# Patient Record
Sex: FEMALE | Race: WHITE | NOT HISPANIC OR LATINO | URBAN - METROPOLITAN AREA
[De-identification: names, ages, dates, MRNs, and addresses within clinical notes are randomized per-mention and may not be internally consistent; named-entity substitution may affect disease eponyms.]

---

## 2017-02-07 ENCOUNTER — INPATIENT (INPATIENT)
Facility: HOSPITAL | Age: 77
LOS: 2 days | Discharge: HOME | End: 2017-02-10
Attending: INTERNAL MEDICINE | Admitting: INTERNAL MEDICINE
Payer: MEDICARE

## 2017-02-07 VITALS
HEART RATE: 100 BPM | RESPIRATION RATE: 16 BRPM | DIASTOLIC BLOOD PRESSURE: 116 MMHG | OXYGEN SATURATION: 97 % | SYSTOLIC BLOOD PRESSURE: 160 MMHG

## 2017-02-07 LAB
ALBUMIN SERPL ELPH-MCNC: 4.3 G/DL — SIGNIFICANT CHANGE UP (ref 3.3–5)
ALP SERPL-CCNC: 74 U/L — SIGNIFICANT CHANGE UP (ref 40–120)
ALT FLD-CCNC: 28 U/L — SIGNIFICANT CHANGE UP (ref 4–33)
APPEARANCE UR: CLEAR — SIGNIFICANT CHANGE UP
APTT BLD: 31.4 SEC — SIGNIFICANT CHANGE UP (ref 27.5–37.4)
AST SERPL-CCNC: 20 U/L — SIGNIFICANT CHANGE UP (ref 4–32)
BACTERIA # UR AUTO: SIGNIFICANT CHANGE UP
BASE EXCESS BLDV CALC-SCNC: 1.6 MMOL/L — SIGNIFICANT CHANGE UP
BASOPHILS # BLD AUTO: 0.04 K/UL — SIGNIFICANT CHANGE UP (ref 0–0.2)
BASOPHILS NFR BLD AUTO: 0.3 % — SIGNIFICANT CHANGE UP (ref 0–2)
BILIRUB SERPL-MCNC: 0.4 MG/DL — SIGNIFICANT CHANGE UP (ref 0.2–1.2)
BILIRUB UR-MCNC: NEGATIVE — SIGNIFICANT CHANGE UP
BLOOD GAS VENOUS - CREATININE: 0.67 MG/DL — SIGNIFICANT CHANGE UP (ref 0.5–1.3)
BLOOD UR QL VISUAL: HIGH
BUN SERPL-MCNC: 23 MG/DL — SIGNIFICANT CHANGE UP (ref 7–23)
CALCIUM SERPL-MCNC: 9.6 MG/DL — SIGNIFICANT CHANGE UP (ref 8.4–10.5)
CHLORIDE BLDV-SCNC: 106 MMOL/L — SIGNIFICANT CHANGE UP (ref 96–108)
CHLORIDE SERPL-SCNC: 99 MMOL/L — SIGNIFICANT CHANGE UP (ref 98–107)
CO2 SERPL-SCNC: 25 MMOL/L — SIGNIFICANT CHANGE UP (ref 22–31)
COLOR SPEC: SIGNIFICANT CHANGE UP
CREAT SERPL-MCNC: 0.66 MG/DL — SIGNIFICANT CHANGE UP (ref 0.5–1.3)
EOSINOPHIL # BLD AUTO: 0.06 K/UL — SIGNIFICANT CHANGE UP (ref 0–0.5)
EOSINOPHIL NFR BLD AUTO: 0.4 % — SIGNIFICANT CHANGE UP (ref 0–6)
GAS PNL BLDV: 136 MMOL/L — SIGNIFICANT CHANGE UP (ref 136–146)
GLUCOSE BLDV-MCNC: 180 — HIGH (ref 70–99)
GLUCOSE SERPL-MCNC: 189 MG/DL — HIGH (ref 70–99)
GLUCOSE UR-MCNC: 50 — SIGNIFICANT CHANGE UP
HBA1C BLD-MCNC: 6.1 % — HIGH (ref 4–5.6)
HCO3 BLDV-SCNC: 24 MMOL/L — SIGNIFICANT CHANGE UP (ref 20–27)
HCT VFR BLD CALC: 42.4 % — SIGNIFICANT CHANGE UP (ref 34.5–45)
HCT VFR BLDV CALC: 45.2 % — HIGH (ref 34.5–45)
HGB BLD-MCNC: 14.4 G/DL — SIGNIFICANT CHANGE UP (ref 11.5–15.5)
HGB BLDV-MCNC: 14.8 G/DL — SIGNIFICANT CHANGE UP (ref 11.5–15.5)
IMM GRANULOCYTES NFR BLD AUTO: 0.3 % — SIGNIFICANT CHANGE UP (ref 0–1.5)
INR BLD: 0.97 — SIGNIFICANT CHANGE UP (ref 0.87–1.18)
KETONES UR-MCNC: SIGNIFICANT CHANGE UP
LACTATE BLDV-MCNC: 1.6 MMOL/L — SIGNIFICANT CHANGE UP (ref 0.5–2)
LEUKOCYTE ESTERASE UR-ACNC: SIGNIFICANT CHANGE UP
LIDOCAIN IGE QN: 38.4 U/L — SIGNIFICANT CHANGE UP (ref 7–60)
LYMPHOCYTES # BLD AUTO: 1.55 K/UL — SIGNIFICANT CHANGE UP (ref 1–3.3)
LYMPHOCYTES # BLD AUTO: 10 % — LOW (ref 13–44)
MCHC RBC-ENTMCNC: 29.4 PG — SIGNIFICANT CHANGE UP (ref 27–34)
MCHC RBC-ENTMCNC: 34 % — SIGNIFICANT CHANGE UP (ref 32–36)
MCV RBC AUTO: 86.5 FL — SIGNIFICANT CHANGE UP (ref 80–100)
MONOCYTES # BLD AUTO: 0.49 K/UL — SIGNIFICANT CHANGE UP (ref 0–0.9)
MONOCYTES NFR BLD AUTO: 3.2 % — SIGNIFICANT CHANGE UP (ref 2–14)
MUCOUS THREADS # UR AUTO: SIGNIFICANT CHANGE UP
NEUTROPHILS # BLD AUTO: 13.31 K/UL — HIGH (ref 1.8–7.4)
NEUTROPHILS NFR BLD AUTO: 85.8 % — HIGH (ref 43–77)
NITRITE UR-MCNC: NEGATIVE — SIGNIFICANT CHANGE UP
PCO2 BLDV: 45 MMHG — SIGNIFICANT CHANGE UP (ref 41–51)
PH BLDV: 7.38 PH — SIGNIFICANT CHANGE UP (ref 7.32–7.43)
PH UR: 6 — SIGNIFICANT CHANGE UP (ref 4.6–8)
PLATELET # BLD AUTO: 254 K/UL — SIGNIFICANT CHANGE UP (ref 150–400)
PMV BLD: 10.5 FL — SIGNIFICANT CHANGE UP (ref 7–13)
PO2 BLDV: < 24 MMHG — LOW (ref 35–40)
POTASSIUM BLDV-SCNC: 4.7 MMOL/L — HIGH (ref 3.4–4.5)
POTASSIUM SERPL-MCNC: 4.3 MMOL/L — SIGNIFICANT CHANGE UP (ref 3.5–5.3)
POTASSIUM SERPL-SCNC: 4.3 MMOL/L — SIGNIFICANT CHANGE UP (ref 3.5–5.3)
PROT SERPL-MCNC: 7.2 G/DL — SIGNIFICANT CHANGE UP (ref 6–8.3)
PROT UR-MCNC: 30 — HIGH
PROTHROM AB SERPL-ACNC: 11 SEC — SIGNIFICANT CHANGE UP (ref 10–13.1)
RBC # BLD: 4.9 M/UL — SIGNIFICANT CHANGE UP (ref 3.8–5.2)
RBC # FLD: 13.1 % — SIGNIFICANT CHANGE UP (ref 10.3–14.5)
RBC CASTS # UR COMP ASSIST: SIGNIFICANT CHANGE UP (ref 0–?)
SAO2 % BLDV: 33.2 % — LOW (ref 60–85)
SODIUM SERPL-SCNC: 138 MMOL/L — SIGNIFICANT CHANGE UP (ref 135–145)
SP GR SPEC: 1.02 — SIGNIFICANT CHANGE UP (ref 1–1.03)
SQUAMOUS # UR AUTO: SIGNIFICANT CHANGE UP
UROBILINOGEN FLD QL: NORMAL E.U. — SIGNIFICANT CHANGE UP (ref 0.1–0.2)
WBC # BLD: 15.49 K/UL — HIGH (ref 3.8–10.5)
WBC # FLD AUTO: 15.49 K/UL — HIGH (ref 3.8–10.5)
WBC UR QL: HIGH (ref 0–?)

## 2017-02-07 RX ORDER — SODIUM CHLORIDE 9 MG/ML
1000 INJECTION INTRAMUSCULAR; INTRAVENOUS; SUBCUTANEOUS ONCE
Qty: 0 | Refills: 0 | Status: COMPLETED | OUTPATIENT
Start: 2017-02-07 | End: 2017-02-07

## 2017-02-07 RX ORDER — SIMVASTATIN 20 MG/1
5 TABLET, FILM COATED ORAL AT BEDTIME
Qty: 0 | Refills: 0 | Status: DISCONTINUED | OUTPATIENT
Start: 2017-02-07 | End: 2017-02-08

## 2017-02-07 RX ORDER — MORPHINE SULFATE 50 MG/1
4 CAPSULE, EXTENDED RELEASE ORAL ONCE
Qty: 0 | Refills: 0 | Status: DISCONTINUED | OUTPATIENT
Start: 2017-02-07 | End: 2017-02-07

## 2017-02-07 RX ORDER — LEVOTHYROXINE SODIUM 125 MCG
100 TABLET ORAL DAILY
Qty: 0 | Refills: 0 | Status: DISCONTINUED | OUTPATIENT
Start: 2017-02-07 | End: 2017-02-10

## 2017-02-07 RX ORDER — METRONIDAZOLE 500 MG
500 TABLET ORAL EVERY 8 HOURS
Qty: 0 | Refills: 0 | Status: DISCONTINUED | OUTPATIENT
Start: 2017-02-07 | End: 2017-02-08

## 2017-02-07 RX ORDER — ACETAMINOPHEN 500 MG
975 TABLET ORAL ONCE
Qty: 0 | Refills: 0 | Status: COMPLETED | OUTPATIENT
Start: 2017-02-07 | End: 2017-02-07

## 2017-02-07 RX ORDER — ONDANSETRON 8 MG/1
4 TABLET, FILM COATED ORAL ONCE
Qty: 0 | Refills: 0 | Status: COMPLETED | OUTPATIENT
Start: 2017-02-07 | End: 2017-02-07

## 2017-02-07 RX ORDER — METFORMIN HYDROCHLORIDE 850 MG/1
500 TABLET ORAL DAILY
Qty: 0 | Refills: 0 | Status: DISCONTINUED | OUTPATIENT
Start: 2017-02-07 | End: 2017-02-08

## 2017-02-07 RX ORDER — SODIUM CHLORIDE 9 MG/ML
1000 INJECTION INTRAMUSCULAR; INTRAVENOUS; SUBCUTANEOUS
Qty: 0 | Refills: 0 | Status: DISCONTINUED | OUTPATIENT
Start: 2017-02-07 | End: 2017-02-10

## 2017-02-07 RX ORDER — CIPROFLOXACIN LACTATE 400MG/40ML
400 VIAL (ML) INTRAVENOUS EVERY 12 HOURS
Qty: 0 | Refills: 0 | Status: DISCONTINUED | OUTPATIENT
Start: 2017-02-07 | End: 2017-02-10

## 2017-02-07 RX ADMIN — Medication 975 MILLIGRAM(S): at 21:47

## 2017-02-07 RX ADMIN — SODIUM CHLORIDE 1000 MILLILITER(S): 9 INJECTION INTRAMUSCULAR; INTRAVENOUS; SUBCUTANEOUS at 17:51

## 2017-02-07 RX ADMIN — MORPHINE SULFATE 4 MILLIGRAM(S): 50 CAPSULE, EXTENDED RELEASE ORAL at 17:50

## 2017-02-07 RX ADMIN — Medication 975 MILLIGRAM(S): at 20:50

## 2017-02-07 RX ADMIN — MORPHINE SULFATE 4 MILLIGRAM(S): 50 CAPSULE, EXTENDED RELEASE ORAL at 12:10

## 2017-02-07 RX ADMIN — ONDANSETRON 4 MILLIGRAM(S): 8 TABLET, FILM COATED ORAL at 11:25

## 2017-02-07 RX ADMIN — MORPHINE SULFATE 4 MILLIGRAM(S): 50 CAPSULE, EXTENDED RELEASE ORAL at 13:05

## 2017-02-07 RX ADMIN — Medication 500 MILLIGRAM(S): at 21:36

## 2017-02-07 RX ADMIN — MORPHINE SULFATE 4 MILLIGRAM(S): 50 CAPSULE, EXTENDED RELEASE ORAL at 11:25

## 2017-02-07 RX ADMIN — MORPHINE SULFATE 4 MILLIGRAM(S): 50 CAPSULE, EXTENDED RELEASE ORAL at 18:05

## 2017-02-07 RX ADMIN — Medication 200 MILLIGRAM(S): at 17:51

## 2017-02-07 RX ADMIN — SODIUM CHLORIDE 100 MILLILITER(S): 9 INJECTION INTRAMUSCULAR; INTRAVENOUS; SUBCUTANEOUS at 19:50

## 2017-02-07 RX ADMIN — MORPHINE SULFATE 4 MILLIGRAM(S): 50 CAPSULE, EXTENDED RELEASE ORAL at 12:09

## 2017-02-07 RX ADMIN — SODIUM CHLORIDE 1000 MILLILITER(S): 9 INJECTION INTRAMUSCULAR; INTRAVENOUS; SUBCUTANEOUS at 11:25

## 2017-02-07 RX ADMIN — ONDANSETRON 4 MILLIGRAM(S): 8 TABLET, FILM COATED ORAL at 19:20

## 2017-02-07 RX ADMIN — SIMVASTATIN 5 MILLIGRAM(S): 20 TABLET, FILM COATED ORAL at 21:36

## 2017-02-07 NOTE — ED CDU PROVIDER NOTE - ATTENDING CONTRIBUTION TO CARE
Pt was seen and evaluated by me. Pt notes having some lower abd pain with nausea and vomiting this morning. Pt denies any fever, chills, SOB, or chest pain. Pt has a history of 2 previous episodes of perforated diverticulitis requiring surgery as well as a cholecystectomy. Lungs CTA b/l. RRR. Abd soft with tenderness to mid abd area. Pt had a CT that showed enteritis. Sent to OBS for IV antibiotics and pain control. Dr Martin: Pt was seen and evaluated by me. Pt notes having some lower abd pain with nausea and vomiting this morning. Pt denies any fever, chills, SOB, or chest pain. Pt has a history of 2 previous episodes of perforated diverticulitis requiring surgery as well as a cholecystectomy. Lungs CTA b/l. RRR. Abd soft with tenderness to mid abd area. Pt had a CT that showed enteritis. Sent to OBS for IV antibiotics and pain control.

## 2017-02-07 NOTE — ED CDU PROVIDER NOTE - PROGRESS NOTE DETAILS
CDU MD Martin Note: Pt states feeling better. Currently receiving IV antibiotics. Pt denies any current nausea. Abd soft with minimal tenderness. Will continue IV antibiotics and pain control. CDU GRZEGORZ WARNER: pt seen at bedside, no complaints at  this time, getting abx, fluids, vss, l/s clear b/l, abd soft, minimal woody-umbilical  tenderness, will reassess CDU GRZEGORZ WARNER: pending repeat labs, will continue with fluids, abx reassess CDU GRZEGORZ García- Patient CDU GRZEGORZ García- Patient states  she still has abdominal pain. States that it has not improved much. Exam: heart- RRR s1s2 nl Lungs - clear bilaterally abd - soft ND tenderness along lower portion abdomen- RLQ,LLQ, Suprapubic region.  Labs: cbc- wbc 11.2 hgb 11.5 hct 34.7 plt 207  bmp- Na 141  K 3.6 cl 104 Co2 22  BUN 13 creat 0.5 gluc 143.  Plan for PO challenge this am. Continue Cipro and Flagyl antibiotics, Surgery team called for consult- patient has been a patient of Dr Lyon in past with surgery for diverticular disease.  Continue to monitor CDU GRZEGORZ García - Patient still complaining of abdominal pain. Feeling nauseated after eating earlier today. Will offer pain medication as needed, Continue to monitor CDU GRZEGORZ García - Patient continues to have ongoing complaints of pain, Has not been able to eat without pain or nausea. has had a little bit of clears only but still has pain. Spoke with patient's PMD Dr Cruz, Will admit to her service for pain management  and encourage PO intake. CDU ADMISSION NOTE: 76F h/o diverticulitis with prior perforation, cholecystectomy presents with n/v and abdominal pain.  In the ED found to have mild leukocytosis and enteritis on CT abd.  Started on cipro/flagyl overnight and made NPO. Patient reports nausea improved but persistent periumbilical pain.  Albe to tolerate PO fluids, but has not tried solids.  Overnight VSS, afebrile. On exam nad, mmm, lungs clear, rrr, abd + periumbilical ttp, no rebound or guarding, no rash, no edema, 2+ pulses.  Given famotidine.  Seen by surgery c/s given persistent pain, plan for admission to medicine. SUZANNA JIMENEZ MD.

## 2017-02-07 NOTE — ED PROVIDER NOTE - PSH
h/o cholecystectomy  2010    Left hand surgery 2004  h/o arthritis hand  Right hand surgery 1999 , h/o arthritis    S/P Appendectomy    S/P Colon Resection    S/P Hysterectomy

## 2017-02-07 NOTE — ED PROVIDER NOTE - ENMT, MLM
Airway patent, Nasal mucosa clear. Mouth with DRY mucosa. Throat has no vesicles, no oropharyngeal exudates and uvula is midline.

## 2017-02-07 NOTE — ED PROVIDER NOTE - PROGRESS NOTE DETAILS
message left with  at dr. morel office to please call back-patients ct shows enteritis, still with significant pain and ttp, will cdu for continued hydration and pain ctrl

## 2017-02-07 NOTE — ED PROVIDER NOTE - OBJECTIVE STATEMENT
77 y/o f presents with abd pain/vomiting. Started last night, many episodes yellow vomiting, nonbloody, diffuse abd pain, constant, severe. Had very small hard bm this morning and passing minimal gas. No fevers, cp, sob, dysuria, hematuria. Has never had pain like this before. H/o multiple abdominal surgeries from diverticulitis.

## 2017-02-07 NOTE — ED CDU PROVIDER NOTE - MEDICAL DECISION MAKING DETAILS
75 y/o female with abd pain with nausea and vomiting with history of perforated diverticulitis found to have colitis on CT. IV antibiotics and analgesia

## 2017-02-07 NOTE — ED CDU PROVIDER NOTE - OBJECTIVE STATEMENT
75 y/o female with PMHx of Hypothyroidism, Diverticulitis with perforation and history of cholecystectomy presents to ED for mid abd pain with nausea and vomiting X 1 day. Pt notes having nausea and vomiting with mid abd pain.

## 2017-02-07 NOTE — ED PROVIDER NOTE - PMH
Adult Hypothyroidism    Arthritis    Diverticulitis    h/o Cholecystitis    h/o Phlebitis 30 yrs ago    h/o Prolapsed Uterus    h/o Pulmonary Embolus 30 yrs ago    Hiatal Hernia    Hyperlipidemia    Meniscus tear lateral left knee    Sleep Apnea

## 2017-02-07 NOTE — ED PROVIDER NOTE - ATTENDING CONTRIBUTION TO CARE
75 y/o f presents with abd pain/vomiting. Started last night, many episodes yellow vomiting, nonbloody, diffuse abd pain, constant, severe. Had very small hard bm this morning and passing minimal gas. No fevers, cp, sob, dysuria, hematuria. Has never had pain like this before. H/o multiple abdominal surgeries from diverticulitis. On exam patient in pain. lungs and heart normal abdomen distended diffusely tender with voluntary guarding. bowel sounds present. Plan: analgesia, antiemetic hydration labs CT abdomen

## 2017-02-08 DIAGNOSIS — E78.5 HYPERLIPIDEMIA, UNSPECIFIED: ICD-10-CM

## 2017-02-08 DIAGNOSIS — E03.9 HYPOTHYROIDISM, UNSPECIFIED: ICD-10-CM

## 2017-02-08 DIAGNOSIS — M48.06 SPINAL STENOSIS, LUMBAR REGION: ICD-10-CM

## 2017-02-08 DIAGNOSIS — K52.9 NONINFECTIVE GASTROENTERITIS AND COLITIS, UNSPECIFIED: ICD-10-CM

## 2017-02-08 DIAGNOSIS — E11.9 TYPE 2 DIABETES MELLITUS WITHOUT COMPLICATIONS: ICD-10-CM

## 2017-02-08 LAB
ALBUMIN SERPL ELPH-MCNC: 3.5 G/DL — SIGNIFICANT CHANGE UP (ref 3.3–5)
ALP SERPL-CCNC: 49 U/L — SIGNIFICANT CHANGE UP (ref 40–120)
ALT FLD-CCNC: 26 U/L — SIGNIFICANT CHANGE UP (ref 4–33)
AST SERPL-CCNC: 19 U/L — SIGNIFICANT CHANGE UP (ref 4–32)
BASOPHILS # BLD AUTO: 0.03 K/UL — SIGNIFICANT CHANGE UP (ref 0–0.2)
BASOPHILS NFR BLD AUTO: 0.3 % — SIGNIFICANT CHANGE UP (ref 0–2)
BILIRUB SERPL-MCNC: 0.5 MG/DL — SIGNIFICANT CHANGE UP (ref 0.2–1.2)
BUN SERPL-MCNC: 13 MG/DL — SIGNIFICANT CHANGE UP (ref 7–23)
CALCIUM SERPL-MCNC: 8.6 MG/DL — SIGNIFICANT CHANGE UP (ref 8.4–10.5)
CHLORIDE SERPL-SCNC: 104 MMOL/L — SIGNIFICANT CHANGE UP (ref 98–107)
CO2 SERPL-SCNC: 22 MMOL/L — SIGNIFICANT CHANGE UP (ref 22–31)
CREAT SERPL-MCNC: 0.57 MG/DL — SIGNIFICANT CHANGE UP (ref 0.5–1.3)
EOSINOPHIL # BLD AUTO: 0.09 K/UL — SIGNIFICANT CHANGE UP (ref 0–0.5)
EOSINOPHIL NFR BLD AUTO: 0.8 % — SIGNIFICANT CHANGE UP (ref 0–6)
GLUCOSE SERPL-MCNC: 143 MG/DL — HIGH (ref 70–99)
HCT VFR BLD CALC: 34.7 % — SIGNIFICANT CHANGE UP (ref 34.5–45)
HGB BLD-MCNC: 11.5 G/DL — SIGNIFICANT CHANGE UP (ref 11.5–15.5)
IMM GRANULOCYTES NFR BLD AUTO: 0.4 % — SIGNIFICANT CHANGE UP (ref 0–1.5)
LYMPHOCYTES # BLD AUTO: 1.97 K/UL — SIGNIFICANT CHANGE UP (ref 1–3.3)
LYMPHOCYTES # BLD AUTO: 17.5 % — SIGNIFICANT CHANGE UP (ref 13–44)
MCHC RBC-ENTMCNC: 28.7 PG — SIGNIFICANT CHANGE UP (ref 27–34)
MCHC RBC-ENTMCNC: 33.1 % — SIGNIFICANT CHANGE UP (ref 32–36)
MCV RBC AUTO: 86.5 FL — SIGNIFICANT CHANGE UP (ref 80–100)
MONOCYTES # BLD AUTO: 0.89 K/UL — SIGNIFICANT CHANGE UP (ref 0–0.9)
MONOCYTES NFR BLD AUTO: 7.9 % — SIGNIFICANT CHANGE UP (ref 2–14)
NEUTROPHILS # BLD AUTO: 8.21 K/UL — HIGH (ref 1.8–7.4)
NEUTROPHILS NFR BLD AUTO: 73.1 % — SIGNIFICANT CHANGE UP (ref 43–77)
PLATELET # BLD AUTO: 207 K/UL — SIGNIFICANT CHANGE UP (ref 150–400)
PMV BLD: 9.9 FL — SIGNIFICANT CHANGE UP (ref 7–13)
POTASSIUM SERPL-MCNC: 3.6 MMOL/L — SIGNIFICANT CHANGE UP (ref 3.5–5.3)
POTASSIUM SERPL-SCNC: 3.6 MMOL/L — SIGNIFICANT CHANGE UP (ref 3.5–5.3)
PROT SERPL-MCNC: 5.9 G/DL — LOW (ref 6–8.3)
RBC # BLD: 4.01 M/UL — SIGNIFICANT CHANGE UP (ref 3.8–5.2)
RBC # FLD: 13.3 % — SIGNIFICANT CHANGE UP (ref 10.3–14.5)
SODIUM SERPL-SCNC: 141 MMOL/L — SIGNIFICANT CHANGE UP (ref 135–145)
SPECIMEN SOURCE: SIGNIFICANT CHANGE UP
WBC # BLD: 11.23 K/UL — HIGH (ref 3.8–10.5)
WBC # FLD AUTO: 11.23 K/UL — HIGH (ref 3.8–10.5)

## 2017-02-08 PROCEDURE — 93010 ELECTROCARDIOGRAM REPORT: CPT

## 2017-02-08 PROCEDURE — 99223 1ST HOSP IP/OBS HIGH 75: CPT

## 2017-02-08 RX ORDER — GLUCAGON INJECTION, SOLUTION 0.5 MG/.1ML
1 INJECTION, SOLUTION SUBCUTANEOUS ONCE
Qty: 0 | Refills: 0 | Status: DISCONTINUED | OUTPATIENT
Start: 2017-02-08 | End: 2017-02-10

## 2017-02-08 RX ORDER — DEXTROSE 50 % IN WATER 50 %
25 SYRINGE (ML) INTRAVENOUS ONCE
Qty: 0 | Refills: 0 | Status: DISCONTINUED | OUTPATIENT
Start: 2017-02-08 | End: 2017-02-10

## 2017-02-08 RX ORDER — ACETAMINOPHEN 500 MG
650 TABLET ORAL EVERY 6 HOURS
Qty: 0 | Refills: 0 | Status: DISCONTINUED | OUTPATIENT
Start: 2017-02-08 | End: 2017-02-08

## 2017-02-08 RX ORDER — INSULIN LISPRO 100/ML
VIAL (ML) SUBCUTANEOUS
Qty: 0 | Refills: 0 | Status: DISCONTINUED | OUTPATIENT
Start: 2017-02-08 | End: 2017-02-10

## 2017-02-08 RX ORDER — DEXTROSE 50 % IN WATER 50 %
12.5 SYRINGE (ML) INTRAVENOUS ONCE
Qty: 0 | Refills: 0 | Status: DISCONTINUED | OUTPATIENT
Start: 2017-02-08 | End: 2017-02-10

## 2017-02-08 RX ORDER — ATORVASTATIN CALCIUM 80 MG/1
20 TABLET, FILM COATED ORAL AT BEDTIME
Qty: 0 | Refills: 0 | Status: DISCONTINUED | OUTPATIENT
Start: 2017-02-08 | End: 2017-02-10

## 2017-02-08 RX ORDER — HEPARIN SODIUM 5000 [USP'U]/ML
5000 INJECTION INTRAVENOUS; SUBCUTANEOUS EVERY 12 HOURS
Qty: 0 | Refills: 0 | Status: DISCONTINUED | OUTPATIENT
Start: 2017-02-08 | End: 2017-02-08

## 2017-02-08 RX ORDER — DOCUSATE SODIUM 100 MG
100 CAPSULE ORAL THREE TIMES A DAY
Qty: 0 | Refills: 0 | Status: DISCONTINUED | OUTPATIENT
Start: 2017-02-08 | End: 2017-02-10

## 2017-02-08 RX ORDER — ASPIRIN/CALCIUM CARB/MAGNESIUM 324 MG
81 TABLET ORAL DAILY
Qty: 0 | Refills: 0 | Status: DISCONTINUED | OUTPATIENT
Start: 2017-02-08 | End: 2017-02-10

## 2017-02-08 RX ORDER — HEPARIN SODIUM 5000 [USP'U]/ML
5000 INJECTION INTRAVENOUS; SUBCUTANEOUS EVERY 8 HOURS
Qty: 0 | Refills: 0 | Status: DISCONTINUED | OUTPATIENT
Start: 2017-02-08 | End: 2017-02-10

## 2017-02-08 RX ORDER — ACETAMINOPHEN 500 MG
650 TABLET ORAL EVERY 8 HOURS
Qty: 0 | Refills: 0 | Status: DISCONTINUED | OUTPATIENT
Start: 2017-02-08 | End: 2017-02-10

## 2017-02-08 RX ORDER — MORPHINE SULFATE 50 MG/1
2 CAPSULE, EXTENDED RELEASE ORAL ONCE
Qty: 0 | Refills: 0 | Status: DISCONTINUED | OUTPATIENT
Start: 2017-02-08 | End: 2017-02-08

## 2017-02-08 RX ORDER — METRONIDAZOLE 500 MG
500 TABLET ORAL EVERY 8 HOURS
Qty: 0 | Refills: 0 | Status: DISCONTINUED | OUTPATIENT
Start: 2017-02-08 | End: 2017-02-10

## 2017-02-08 RX ORDER — KETOROLAC TROMETHAMINE 30 MG/ML
15 SYRINGE (ML) INJECTION EVERY 12 HOURS
Qty: 0 | Refills: 0 | Status: DISCONTINUED | OUTPATIENT
Start: 2017-02-08 | End: 2017-02-08

## 2017-02-08 RX ORDER — LACTOBACILLUS ACIDOPHILUS 100MM CELL
1 CAPSULE ORAL EVERY 12 HOURS
Qty: 0 | Refills: 0 | Status: DISCONTINUED | OUTPATIENT
Start: 2017-02-08 | End: 2017-02-10

## 2017-02-08 RX ORDER — INSULIN LISPRO 100/ML
VIAL (ML) SUBCUTANEOUS AT BEDTIME
Qty: 0 | Refills: 0 | Status: DISCONTINUED | OUTPATIENT
Start: 2017-02-08 | End: 2017-02-10

## 2017-02-08 RX ORDER — FAMOTIDINE 10 MG/ML
20 INJECTION INTRAVENOUS ONCE
Qty: 0 | Refills: 0 | Status: COMPLETED | OUTPATIENT
Start: 2017-02-08 | End: 2017-02-08

## 2017-02-08 RX ORDER — ONDANSETRON 8 MG/1
4 TABLET, FILM COATED ORAL EVERY 8 HOURS
Qty: 0 | Refills: 0 | Status: DISCONTINUED | OUTPATIENT
Start: 2017-02-08 | End: 2017-02-10

## 2017-02-08 RX ORDER — DEXTROSE 50 % IN WATER 50 %
1 SYRINGE (ML) INTRAVENOUS ONCE
Qty: 0 | Refills: 0 | Status: DISCONTINUED | OUTPATIENT
Start: 2017-02-08 | End: 2017-02-10

## 2017-02-08 RX ORDER — SODIUM CHLORIDE 9 MG/ML
1000 INJECTION, SOLUTION INTRAVENOUS
Qty: 0 | Refills: 0 | Status: DISCONTINUED | OUTPATIENT
Start: 2017-02-08 | End: 2017-02-10

## 2017-02-08 RX ORDER — PANTOPRAZOLE SODIUM 20 MG/1
40 TABLET, DELAYED RELEASE ORAL DAILY
Qty: 0 | Refills: 0 | Status: DISCONTINUED | OUTPATIENT
Start: 2017-02-08 | End: 2017-02-10

## 2017-02-08 RX ADMIN — Medication 650 MILLIGRAM(S): at 11:21

## 2017-02-08 RX ADMIN — Medication 650 MILLIGRAM(S): at 12:15

## 2017-02-08 RX ADMIN — Medication 15 MILLIGRAM(S): at 17:45

## 2017-02-08 RX ADMIN — FAMOTIDINE 20 MILLIGRAM(S): 10 INJECTION INTRAVENOUS at 09:13

## 2017-02-08 RX ADMIN — METFORMIN HYDROCHLORIDE 500 MILLIGRAM(S): 850 TABLET ORAL at 12:41

## 2017-02-08 RX ADMIN — Medication 100 MICROGRAM(S): at 06:26

## 2017-02-08 RX ADMIN — Medication 200 MILLIGRAM(S): at 06:27

## 2017-02-08 RX ADMIN — MORPHINE SULFATE 2 MILLIGRAM(S): 50 CAPSULE, EXTENDED RELEASE ORAL at 12:41

## 2017-02-08 RX ADMIN — Medication 500 MILLIGRAM(S): at 06:26

## 2017-02-08 RX ADMIN — SODIUM CHLORIDE 100 MILLILITER(S): 9 INJECTION INTRAMUSCULAR; INTRAVENOUS; SUBCUTANEOUS at 12:42

## 2017-02-08 RX ADMIN — MORPHINE SULFATE 2 MILLIGRAM(S): 50 CAPSULE, EXTENDED RELEASE ORAL at 02:33

## 2017-02-08 RX ADMIN — Medication 15 MILLIGRAM(S): at 17:29

## 2017-02-08 RX ADMIN — MORPHINE SULFATE 2 MILLIGRAM(S): 50 CAPSULE, EXTENDED RELEASE ORAL at 02:47

## 2017-02-08 RX ADMIN — MORPHINE SULFATE 2 MILLIGRAM(S): 50 CAPSULE, EXTENDED RELEASE ORAL at 13:00

## 2017-02-08 RX ADMIN — SODIUM CHLORIDE 100 MILLILITER(S): 9 INJECTION INTRAMUSCULAR; INTRAVENOUS; SUBCUTANEOUS at 05:10

## 2017-02-08 RX ADMIN — Medication 500 MILLIGRAM(S): at 12:45

## 2017-02-08 RX ADMIN — Medication 200 MILLIGRAM(S): at 18:00

## 2017-02-08 NOTE — H&P ADULT. - ATTENDING COMMENTS
Pt was seen & Examined by me, Dr. GRICEL Kingston on 2/08/2017.    Dr. Cruz will resume the care of pt in  AM.     I Notified the covering MD for Dr. Nancy young regarding the admission, he was told that pt was admitted to the Medical service from CDU and will be covered by ADS team and he gave okay to the H+P tonight for him.

## 2017-02-08 NOTE — H&P ADULT. - ASSESSMENT
75 y/o female HX of DM, OA, Hypothyroid, Obesity, HX of REMOTE PE and Phlebitis years ago, HX of Diverticulitis with multiple abdominal surgeries in the past, Hiatal Hernia, PREMA not on CPAP, Spinal Stenosis L/S, High Cholesterol, Pt was admitted to CDU initially for lower abdominal pain, + N/V with Bile return since Sunday, No fever, No Dysuria, + Urinary frequency, Last BM 2 days ago, + constipation, passes gas, + mild  HA, No Blood in vomit, NO CP, NO SOB, no cough, no sore throat, no rash, No Neck pain, Chronic LBP, No legs pain, No dizziness, pt S/P CT A/P with contrast c/w Enteritis involving a loop of pelvic small bowel, seen by surgery, no intervention, recommended Cipro, Flagyl, Supportive care and pain control, Pt s/p IV Morphine in the CDU with some help, but still c/o Lower abdominal pain, refuses Morphine now due to constipation, on Clear Liquid diet, and IVF NS, Pt is A+O x3,

## 2017-02-08 NOTE — H&P ADULT. - PROBLEM SELECTOR PLAN 1
Surgery F/U, GI Consult House in AM, Clear Liquid Diet, IVF NS, Pain Control IV Zofran PRN, IV Protonix,   IV Cipro, IV Flagyl, Bacid  F/U CBC, CMP, Repeat UA, Iron studies, Vit B12, Folate, Hepatitis A, B, C Profile  Colace,   Fall/Aspiration precaution

## 2017-02-08 NOTE — H&P ADULT. - PMH
Adult Hypothyroidism    Arthritis    Diverticulitis    DM (diabetes mellitus)    h/o Cholecystitis    h/o Phlebitis 30 yrs ago    h/o Prolapsed Uterus    h/o Pulmonary Embolus 30 yrs ago    Hiatal Hernia    Hyperlipidemia    Meniscus tear lateral left knee    Sleep Apnea    Spinal stenosis of lumbar region

## 2017-02-08 NOTE — H&P ADULT. - MUSCULOSKELETAL
details… detailed exam no joint erythema/no joint swelling/no calf tenderness/ROM intact/no joint warmth

## 2017-02-08 NOTE — H&P ADULT. - HISTORY OF PRESENT ILLNESS
77 y/o female HX of DM, OA, Hypothyroid, Obesity, HX of REMOTE PE and Phlebitis years ago, HX of Diverticulitis with multiple abdominal surgeries in the past, Hiatal Hernia, PREMA not on CPAP, Spinal Stenosis L/S, High Cholesterol, Pt was admitted to CDU initially for lower abdominal pain, + N/V with Bile return since Sunday, No fever, No Dysuria, + Urinary frequency, Last BM 2 days ago, + constipation, passes gas, + mild  HA, No Blood in vomit, NO CP, NO SOB, no cough, no sore throat, no rash, No Neck pain, Chronic LBP, No legs pain, No dizziness, pt S/P CT A/P with contrast c/w Enteritis involving a loop of pelvic small bowel, seen by surgery, no intervention, recommended Cipro, Flagyl, Supportive care and pain control, Pt s/p IV Morphine in the CDU with some help, but still c/o Lower abdominal pain, refuses Morphine now due to constipation, on Clear Liquid diet, and IVF NS, Pt is A+O x3,     Labs: Na 141, K+ 3.6, Glucose 143, LFT Normal, BUN 13, Creatinine 0.57, WBC 11.23 ( Leukocytosis), Platelet 207, Hgb 11.5, Urine Culture Neg., PT 11, INR 0.97, PTT 31.4, Hgb 6.1%, UA: Small Blood, Glucose  50, trace Ketone,      Vitals: Tem 98, HR 61, /57, RR 18, 98 % RA,

## 2017-02-09 LAB
ALBUMIN SERPL ELPH-MCNC: 3.4 G/DL — SIGNIFICANT CHANGE UP (ref 3.3–5)
ALP SERPL-CCNC: 48 U/L — SIGNIFICANT CHANGE UP (ref 40–120)
ALT FLD-CCNC: 42 U/L — HIGH (ref 4–33)
AMYLASE P1 CFR SERPL: 31 U/L — SIGNIFICANT CHANGE UP (ref 25–125)
AST SERPL-CCNC: 31 U/L — SIGNIFICANT CHANGE UP (ref 4–32)
BACTERIA UR CULT: SIGNIFICANT CHANGE UP
BASOPHILS # BLD AUTO: 0.05 K/UL — SIGNIFICANT CHANGE UP (ref 0–0.2)
BASOPHILS NFR BLD AUTO: 0.7 % — SIGNIFICANT CHANGE UP (ref 0–2)
BILIRUB SERPL-MCNC: 0.4 MG/DL — SIGNIFICANT CHANGE UP (ref 0.2–1.2)
BUN SERPL-MCNC: 11 MG/DL — SIGNIFICANT CHANGE UP (ref 7–23)
CALCIUM SERPL-MCNC: 8.6 MG/DL — SIGNIFICANT CHANGE UP (ref 8.4–10.5)
CHLORIDE SERPL-SCNC: 108 MMOL/L — HIGH (ref 98–107)
CHOLEST SERPL-MCNC: 113 MG/DL — LOW (ref 120–199)
CK SERPL-CCNC: 120 U/L — SIGNIFICANT CHANGE UP (ref 25–170)
CO2 SERPL-SCNC: 22 MMOL/L — SIGNIFICANT CHANGE UP (ref 22–31)
CREAT SERPL-MCNC: 0.57 MG/DL — SIGNIFICANT CHANGE UP (ref 0.5–1.3)
EOSINOPHIL # BLD AUTO: 0.19 K/UL — SIGNIFICANT CHANGE UP (ref 0–0.5)
EOSINOPHIL NFR BLD AUTO: 2.5 % — SIGNIFICANT CHANGE UP (ref 0–6)
FERRITIN SERPL-MCNC: 421.6 NG/ML — HIGH (ref 15–150)
FOLATE SERPL-MCNC: 12.7 NG/ML — SIGNIFICANT CHANGE UP (ref 4.7–20)
GLUCOSE SERPL-MCNC: 107 MG/DL — HIGH (ref 70–99)
HAV IGM SER-ACNC: NONREACTIVE — SIGNIFICANT CHANGE UP
HBV CORE IGM SER-ACNC: NONREACTIVE — SIGNIFICANT CHANGE UP
HBV SURFACE AG SER-ACNC: NONREACTIVE — SIGNIFICANT CHANGE UP
HCT VFR BLD CALC: 32.8 % — LOW (ref 34.5–45)
HCV AB S/CO SERPL IA: 0.17 S/CO — SIGNIFICANT CHANGE UP
HCV AB SERPL-IMP: SIGNIFICANT CHANGE UP
HDLC SERPL-MCNC: 60 MG/DL — SIGNIFICANT CHANGE UP (ref 45–65)
HGB BLD-MCNC: 10.9 G/DL — LOW (ref 11.5–15.5)
IMM GRANULOCYTES NFR BLD AUTO: 0.4 % — SIGNIFICANT CHANGE UP (ref 0–1.5)
IRON SATN MFR SERPL: 237 UG/DL — SIGNIFICANT CHANGE UP (ref 140–530)
IRON SATN MFR SERPL: 41 UG/DL — SIGNIFICANT CHANGE UP (ref 30–160)
LIDOCAIN IGE QN: 30.8 U/L — SIGNIFICANT CHANGE UP (ref 7–60)
LIPID PNL WITH DIRECT LDL SERPL: 48 MG/DL — SIGNIFICANT CHANGE UP
LYMPHOCYTES # BLD AUTO: 2.42 K/UL — SIGNIFICANT CHANGE UP (ref 1–3.3)
LYMPHOCYTES # BLD AUTO: 32.1 % — SIGNIFICANT CHANGE UP (ref 13–44)
MAGNESIUM SERPL-MCNC: 1.8 MG/DL — SIGNIFICANT CHANGE UP (ref 1.6–2.6)
MCHC RBC-ENTMCNC: 28.8 PG — SIGNIFICANT CHANGE UP (ref 27–34)
MCHC RBC-ENTMCNC: 33.2 % — SIGNIFICANT CHANGE UP (ref 32–36)
MCV RBC AUTO: 86.8 FL — SIGNIFICANT CHANGE UP (ref 80–100)
MONOCYTES # BLD AUTO: 0.66 K/UL — SIGNIFICANT CHANGE UP (ref 0–0.9)
MONOCYTES NFR BLD AUTO: 8.8 % — SIGNIFICANT CHANGE UP (ref 2–14)
NEUTROPHILS # BLD AUTO: 4.18 K/UL — SIGNIFICANT CHANGE UP (ref 1.8–7.4)
NEUTROPHILS NFR BLD AUTO: 55.5 % — SIGNIFICANT CHANGE UP (ref 43–77)
PHOSPHATE SERPL-MCNC: 2.7 MG/DL — SIGNIFICANT CHANGE UP (ref 2.5–4.5)
PLATELET # BLD AUTO: 195 K/UL — SIGNIFICANT CHANGE UP (ref 150–400)
PMV BLD: 10.2 FL — SIGNIFICANT CHANGE UP (ref 7–13)
POTASSIUM SERPL-MCNC: 3.4 MMOL/L — LOW (ref 3.5–5.3)
POTASSIUM SERPL-SCNC: 3.4 MMOL/L — LOW (ref 3.5–5.3)
PROT SERPL-MCNC: 5.8 G/DL — LOW (ref 6–8.3)
RBC # BLD: 3.78 M/UL — LOW (ref 3.8–5.2)
RBC # FLD: 13.4 % — SIGNIFICANT CHANGE UP (ref 10.3–14.5)
SODIUM SERPL-SCNC: 143 MMOL/L — SIGNIFICANT CHANGE UP (ref 135–145)
T4 FREE SERPL-MCNC: 1.15 NG/DL — SIGNIFICANT CHANGE UP (ref 0.9–1.8)
TRIGL SERPL-MCNC: 72 MG/DL — SIGNIFICANT CHANGE UP (ref 10–149)
TSH SERPL-MCNC: 3.96 UIU/ML — SIGNIFICANT CHANGE UP (ref 0.27–4.2)
UIBC SERPL-MCNC: 196 UG/DL — SIGNIFICANT CHANGE UP (ref 110–370)
VIT B12 SERPL-MCNC: 463 PG/ML — SIGNIFICANT CHANGE UP (ref 200–900)
WBC # BLD: 7.53 K/UL — SIGNIFICANT CHANGE UP (ref 3.8–10.5)
WBC # FLD AUTO: 7.53 K/UL — SIGNIFICANT CHANGE UP (ref 3.8–10.5)

## 2017-02-09 PROCEDURE — 99222 1ST HOSP IP/OBS MODERATE 55: CPT | Mod: GC

## 2017-02-09 RX ORDER — LACTULOSE 10 G/15ML
10 SOLUTION ORAL
Qty: 0 | Refills: 0 | Status: DISCONTINUED | OUTPATIENT
Start: 2017-02-09 | End: 2017-02-10

## 2017-02-09 RX ORDER — POTASSIUM CHLORIDE 20 MEQ
20 PACKET (EA) ORAL ONCE
Qty: 0 | Refills: 0 | Status: COMPLETED | OUTPATIENT
Start: 2017-02-09 | End: 2017-02-09

## 2017-02-09 RX ADMIN — Medication 100 MILLIGRAM(S): at 15:17

## 2017-02-09 RX ADMIN — HEPARIN SODIUM 5000 UNIT(S): 5000 INJECTION INTRAVENOUS; SUBCUTANEOUS at 00:11

## 2017-02-09 RX ADMIN — ATORVASTATIN CALCIUM 20 MILLIGRAM(S): 80 TABLET, FILM COATED ORAL at 00:11

## 2017-02-09 RX ADMIN — Medication 100 MILLIGRAM(S): at 15:25

## 2017-02-09 RX ADMIN — LACTULOSE 10 GRAM(S): 10 SOLUTION ORAL at 17:31

## 2017-02-09 RX ADMIN — Medication 100 MICROGRAM(S): at 06:30

## 2017-02-09 RX ADMIN — Medication 200 MILLIGRAM(S): at 06:25

## 2017-02-09 RX ADMIN — Medication 100 MILLIGRAM(S): at 00:10

## 2017-02-09 RX ADMIN — Medication 100 MILLIGRAM(S): at 08:34

## 2017-02-09 RX ADMIN — Medication 200 MILLIGRAM(S): at 17:31

## 2017-02-09 RX ADMIN — PANTOPRAZOLE SODIUM 40 MILLIGRAM(S): 20 TABLET, DELAYED RELEASE ORAL at 00:10

## 2017-02-09 RX ADMIN — Medication 81 MILLIGRAM(S): at 15:17

## 2017-02-09 RX ADMIN — Medication 1 TABLET(S): at 06:26

## 2017-02-09 RX ADMIN — HEPARIN SODIUM 5000 UNIT(S): 5000 INJECTION INTRAVENOUS; SUBCUTANEOUS at 06:26

## 2017-02-09 RX ADMIN — SODIUM CHLORIDE 100 MILLILITER(S): 9 INJECTION INTRAMUSCULAR; INTRAVENOUS; SUBCUTANEOUS at 15:17

## 2017-02-09 RX ADMIN — PANTOPRAZOLE SODIUM 40 MILLIGRAM(S): 20 TABLET, DELAYED RELEASE ORAL at 17:31

## 2017-02-09 RX ADMIN — SODIUM CHLORIDE 100 MILLILITER(S): 9 INJECTION INTRAMUSCULAR; INTRAVENOUS; SUBCUTANEOUS at 08:35

## 2017-02-09 RX ADMIN — Medication 650 MILLIGRAM(S): at 11:17

## 2017-02-09 RX ADMIN — ATORVASTATIN CALCIUM 20 MILLIGRAM(S): 80 TABLET, FILM COATED ORAL at 21:57

## 2017-02-09 RX ADMIN — Medication 650 MILLIGRAM(S): at 12:17

## 2017-02-09 RX ADMIN — Medication 5 MILLIGRAM(S): at 17:31

## 2017-02-09 RX ADMIN — Medication 20 MILLIEQUIVALENT(S): at 15:16

## 2017-02-09 RX ADMIN — Medication 100 MILLIGRAM(S): at 06:26

## 2017-02-09 RX ADMIN — Medication 100 MILLIGRAM(S): at 21:57

## 2017-02-09 RX ADMIN — Medication 1: at 12:55

## 2017-02-09 RX ADMIN — HEPARIN SODIUM 5000 UNIT(S): 5000 INJECTION INTRAVENOUS; SUBCUTANEOUS at 15:17

## 2017-02-09 RX ADMIN — HEPARIN SODIUM 5000 UNIT(S): 5000 INJECTION INTRAVENOUS; SUBCUTANEOUS at 21:57

## 2017-02-09 RX ADMIN — Medication 1 TABLET(S): at 17:31

## 2017-02-09 NOTE — PHYSICAL THERAPY INITIAL EVALUATION ADULT - ADDITIONAL COMMENTS
Pt lives in house with 3 steps to enter and no other stairs to negotiate, ambulates independently without AD

## 2017-02-09 NOTE — PHYSICAL THERAPY INITIAL EVALUATION ADULT - PERTINENT HX OF CURRENT PROBLEM, REHAB EVAL
75 y/o female HX of DM, OA, Hypothyroid, Obesity, HX of REMOTE PE and Phlebitis years ago, HX of Diverticulitis with multiple abdominal surgeries in the past, Hiatal Hernia, PREMA not on CPAP, Spinal Stenosis L/S, High Cholesterol, Pt was admitted to CDU initially for lower abdominal pain

## 2017-02-10 ENCOUNTER — TRANSCRIPTION ENCOUNTER (OUTPATIENT)
Age: 77
End: 2017-02-10

## 2017-02-10 VITALS
TEMPERATURE: 97 F | OXYGEN SATURATION: 100 % | DIASTOLIC BLOOD PRESSURE: 81 MMHG | RESPIRATION RATE: 18 BRPM | HEART RATE: 67 BPM | SYSTOLIC BLOOD PRESSURE: 173 MMHG

## 2017-02-10 LAB
APPEARANCE UR: CLEAR — SIGNIFICANT CHANGE UP
BILIRUB UR-MCNC: NEGATIVE — SIGNIFICANT CHANGE UP
BLOOD UR QL VISUAL: NEGATIVE — SIGNIFICANT CHANGE UP
BUN SERPL-MCNC: 10 MG/DL — SIGNIFICANT CHANGE UP (ref 7–23)
CALCIUM SERPL-MCNC: 9.4 MG/DL — SIGNIFICANT CHANGE UP (ref 8.4–10.5)
CHLORIDE SERPL-SCNC: 107 MMOL/L — SIGNIFICANT CHANGE UP (ref 98–107)
CO2 SERPL-SCNC: 23 MMOL/L — SIGNIFICANT CHANGE UP (ref 22–31)
COLOR SPEC: SIGNIFICANT CHANGE UP
CREAT SERPL-MCNC: 0.62 MG/DL — SIGNIFICANT CHANGE UP (ref 0.5–1.3)
GLUCOSE SERPL-MCNC: 111 MG/DL — HIGH (ref 70–99)
GLUCOSE UR-MCNC: NEGATIVE — SIGNIFICANT CHANGE UP
HCT VFR BLD CALC: 37.4 % — SIGNIFICANT CHANGE UP (ref 34.5–45)
HGB BLD-MCNC: 12.5 G/DL — SIGNIFICANT CHANGE UP (ref 11.5–15.5)
KETONES UR-MCNC: NEGATIVE — SIGNIFICANT CHANGE UP
LEUKOCYTE ESTERASE UR-ACNC: NEGATIVE — SIGNIFICANT CHANGE UP
MCHC RBC-ENTMCNC: 28.7 PG — SIGNIFICANT CHANGE UP (ref 27–34)
MCHC RBC-ENTMCNC: 33.4 % — SIGNIFICANT CHANGE UP (ref 32–36)
MCV RBC AUTO: 85.8 FL — SIGNIFICANT CHANGE UP (ref 80–100)
NITRITE UR-MCNC: NEGATIVE — SIGNIFICANT CHANGE UP
PH UR: 7.5 — SIGNIFICANT CHANGE UP (ref 5–8)
PLATELET # BLD AUTO: 251 K/UL — SIGNIFICANT CHANGE UP (ref 150–400)
PMV BLD: 10.3 FL — SIGNIFICANT CHANGE UP (ref 7–13)
POTASSIUM SERPL-MCNC: 4 MMOL/L — SIGNIFICANT CHANGE UP (ref 3.5–5.3)
POTASSIUM SERPL-SCNC: 4 MMOL/L — SIGNIFICANT CHANGE UP (ref 3.5–5.3)
PROT UR-MCNC: NEGATIVE — SIGNIFICANT CHANGE UP
RBC # BLD: 4.36 M/UL — SIGNIFICANT CHANGE UP (ref 3.8–5.2)
RBC # FLD: 13.1 % — SIGNIFICANT CHANGE UP (ref 10.3–14.5)
RBC CASTS # UR COMP ASSIST: SIGNIFICANT CHANGE UP (ref 0–?)
SODIUM SERPL-SCNC: 145 MMOL/L — SIGNIFICANT CHANGE UP (ref 135–145)
SP GR SPEC: 1 — LOW (ref 1–1.03)
SQUAMOUS # UR AUTO: SIGNIFICANT CHANGE UP
UROBILINOGEN FLD QL: NORMAL E.U. — SIGNIFICANT CHANGE UP (ref 0.2–1)
WBC # BLD: 10.61 K/UL — HIGH (ref 3.8–10.5)
WBC # FLD AUTO: 10.61 K/UL — HIGH (ref 3.8–10.5)
WBC UR QL: SIGNIFICANT CHANGE UP (ref 0–?)

## 2017-02-10 PROCEDURE — 99232 SBSQ HOSP IP/OBS MODERATE 35: CPT | Mod: GC

## 2017-02-10 RX ORDER — LACTOBACILLUS ACIDOPHILUS 100MM CELL
1 CAPSULE ORAL
Qty: 60 | Refills: 0
Start: 2017-02-10 | End: 2017-03-12

## 2017-02-10 RX ORDER — ACETAMINOPHEN 500 MG
2 TABLET ORAL
Qty: 0 | Refills: 0 | DISCHARGE
Start: 2017-02-10

## 2017-02-10 RX ORDER — DOCUSATE SODIUM 100 MG
1 CAPSULE ORAL
Qty: 90 | Refills: 0
Start: 2017-02-10 | End: 2017-03-12

## 2017-02-10 RX ORDER — LACTULOSE 10 G/15ML
15 SOLUTION ORAL
Qty: 900 | Refills: 0
Start: 2017-02-10 | End: 2017-03-12

## 2017-02-10 RX ORDER — MOXIFLOXACIN HYDROCHLORIDE TABLETS, 400 MG 400 MG/1
1 TABLET, FILM COATED ORAL
Qty: 14 | Refills: 0
Start: 2017-02-10 | End: 2017-02-17

## 2017-02-10 RX ADMIN — Medication 100 MILLIGRAM(S): at 00:08

## 2017-02-10 RX ADMIN — SODIUM CHLORIDE 100 MILLILITER(S): 9 INJECTION INTRAMUSCULAR; INTRAVENOUS; SUBCUTANEOUS at 06:05

## 2017-02-10 RX ADMIN — Medication 81 MILLIGRAM(S): at 12:37

## 2017-02-10 RX ADMIN — PANTOPRAZOLE SODIUM 40 MILLIGRAM(S): 20 TABLET, DELAYED RELEASE ORAL at 12:37

## 2017-02-10 RX ADMIN — Medication 100 MICROGRAM(S): at 06:05

## 2017-02-10 RX ADMIN — Medication 100 MILLIGRAM(S): at 12:37

## 2017-02-10 RX ADMIN — HEPARIN SODIUM 5000 UNIT(S): 5000 INJECTION INTRAVENOUS; SUBCUTANEOUS at 06:05

## 2017-02-10 RX ADMIN — Medication 1 TABLET(S): at 06:05

## 2017-02-10 RX ADMIN — HEPARIN SODIUM 5000 UNIT(S): 5000 INJECTION INTRAVENOUS; SUBCUTANEOUS at 12:37

## 2017-02-10 RX ADMIN — Medication 200 MILLIGRAM(S): at 06:05

## 2017-02-10 RX ADMIN — LACTULOSE 10 GRAM(S): 10 SOLUTION ORAL at 06:05

## 2017-02-10 RX ADMIN — Medication 100 MILLIGRAM(S): at 06:05

## 2017-02-10 NOTE — DISCHARGE NOTE ADULT - HOSPITAL COURSE
75 y/o female HX of DM, OA, Hypothyroid, Obesity, HX of REMOTE PE and Phlebitis years ago, HX of Diverticulitis with multiple abdominal surgeries in the past, Hiatal Hernia, PREMA not on CPAP, Spinal Stenosis L/S, High Cholesterol, Pt was admitted to CDU initially for lower abdominal pain, + N/V with Bile return since Sunday, No fever, No Dysuria, + Urinary frequency, Last BM 2 days ago, + constipation, passes gas, + mild  HA, No Blood in vomit, NO CP, NO SOB, no cough, no sore throat, no rash, No Neck pain, Chronic LBP, No legs pain, No dizziness, pt S/P CT A/P with contrast c/w Enteritis involving a loop of pelvic small bowel, seen by surgery, no intervention, recommended Cipro, Flagyl, Supportive care and pain control, Pt s/p IV Morphine in the CDU with some help, but still c/o Lower abdominal pain, refuses Morphine now due to constipation, on Clear Liquid diet, and IVF NS, Pt is A+O x3,     Enteritis  -Surgery following: supportive care, pain control, no acute surgical intervention  -GI following: Acute gastroenteritis: continue monitor hgb: 2/10 hgb 12.5 hct 37.4 stable.    EGD as an outpt if indicated   -IVF NS, Pain Control IV Zofran PRN, IV Protonix,   -IV Cipro, IV Flagyl, Bacid  -Bowel regimen colace and lactulose   -CT abd and pelvis:Enteritis involving a loop of pelvic small bowel.  -acute hepatitis panel: wnl    DM (diabetes mellitus)  -Hold Metformin, S/P IV Contrast for CT  -FS, sliding Scale.     Spinal stenosis of lumbar region  -PT consult: home no needs   -Fall precaution    DVT Prophylaxis  -SQ Heparin.     HLD  -On Lipitor, ASA     HYPOTHYROIDISM   -on Synthroid  -TSH: 3.96; Free Thyroxine 1.15

## 2017-02-10 NOTE — DISCHARGE NOTE ADULT - PATIENT PORTAL LINK FT
“You can access the [facility] Patient Portal, offered by [facility], by registering with the following website: [email or web address]”

## 2017-02-10 NOTE — DISCHARGE NOTE ADULT - PLAN OF CARE
remain free of infection Please follow up with Dr. Cruz in 1 week.  Please call to make an appointment .  Please complete your course of antibiotics as prescribed. EGD/Colonoscopy as an out patient if indicated.  CBC stable. Pt tolerating regular diet. maintain adequate glucose control please check your fingersticks as prescribed and continue metformin continue current regimen continue bowel regimen

## 2017-02-10 NOTE — DISCHARGE NOTE ADULT - CARE PROVIDER_API CALL
Adriana Cruz), Internal Medicine  [address] [place] [address]  Phone: [phone]  Fax: [phone]

## 2017-02-10 NOTE — DISCHARGE NOTE ADULT - MEDICATION SUMMARY - MEDICATIONS TO TAKE
I will START or STAY ON the medications listed below when I get home from the hospital:    aspirin 81 mg oral delayed release tablet  -- 1 tab(s) by mouth once a day  -- Indication: For prophylaxis    acetaminophen 325 mg oral tablet  -- 2 tab(s) by mouth every 8 hours, As needed, Mod-severe pain  -- Indication: For pain    metFORMIN 500 mg oral tablet, extended release  -- 1 tab(s) by mouth once a day  -- Indication: For Diabetes Mellitus    rosuvastatin 5 mg oral tablet  -- 1 tab(s) by mouth once a day (at bedtime)  -- Indication: For Hyperlipidemia    lactulose 10 g/15 mL oral syrup  -- 15 milliliter(s) by mouth 2 times a day  -- Indication: For constipation     docusate sodium 100 mg oral capsule  -- 1 cap(s) by mouth 3 times a day  -- Indication: For bowel regimen     lactobacillus acidophilus oral capsule  -- 1 cap(s) by mouth 2 times a day with meals   -- Indication: For gastroenteritis    Cipro 500 mg oral tablet  -- 1 tab(s) by mouth 2 times a day  -- Avoid prolonged or excessive exposure to direct and/or artificial sunlight while taking this medication.  Check with your doctor before becoming pregnant.  Do not take dairy products, antacids, or iron preparations within one hour of this medication.  Finish all this medication unless otherwise directed by prescriber.  Medication should be taken with plenty of water.    -- Indication: For gastroenteritis     levothyroxine 100 mcg (0.1 mg) oral tablet  -- 1 tab(s) by mouth once a day  -- Indication: For Hypothyroidism    Vitamin D3 50,000 intl units oral capsule  -- 1 cap(s) by mouth once a week  -- Indication: For Supplement I will START or STAY ON the medications listed below when I get home from the hospital:    aspirin 81 mg oral delayed release tablet  -- 1 tab(s) by mouth once a day  -- Indication: For prophylaxis    acetaminophen 325 mg oral tablet  -- 2 tab(s) by mouth every 8 hours, As needed, Mod-severe pain  -- Indication: For pain    metFORMIN 500 mg oral tablet, extended release  -- 1 tab(s) by mouth once a day  -- Indication: For Diabetes  mellitus    rosuvastatin 5 mg oral tablet  -- 1 tab(s) by mouth once a day (at bedtime)  -- Indication: For Hyperlipidemia    lactulose 10 g/15 mL oral syrup  -- 15 milliliter(s) by mouth 2 times a day  -- Indication: For bowel regimen     docusate sodium 100 mg oral capsule  -- 1 cap(s) by mouth 3 times a day  -- Indication: For bowel regimen     lactobacillus acidophilus oral capsule  -- 1 cap(s) by mouth 2 times a day with meals   -- Indication: For prophylaxis    Cipro 500 mg oral tablet  -- 1 tab(s) by mouth 2 times a day  -- Avoid prolonged or excessive exposure to direct and/or artificial sunlight while taking this medication.  Check with your doctor before becoming pregnant.  Do not take dairy products, antacids, or iron preparations within one hour of this medication.  Finish all this medication unless otherwise directed by prescriber.  Medication should be taken with plenty of water.    -- Indication: For gastroenteritis     levothyroxine 100 mcg (0.1 mg) oral tablet  -- 1 tab(s) by mouth once a day  -- Indication: For Hypothyroidism     Vitamin D3 50,000 intl units oral capsule  -- 1 cap(s) by mouth once a week  -- Indication: For Supplement

## 2017-02-10 NOTE — DISCHARGE NOTE ADULT - CARE PLAN
Principal Discharge DX:	Enteritis  Goal:	remain free of infection  Instructions for follow-up, activity and diet:	Please follow up with Dr. Cruz in 1 week.  Please call to make an appointment .  Please complete your course of antibiotics as prescribed. EGD/Colonoscopy as an out patient if indicated.  CBC stable. Pt tolerating regular diet.  Secondary Diagnosis:	DM (diabetes mellitus)  Goal:	maintain adequate glucose control  Instructions for follow-up, activity and diet:	please check your fingersticks as prescribed and continue metformin  Secondary Diagnosis:	Hypothyroidism  Goal:	continue current regimen  Secondary Diagnosis:	Constipation  Goal:	continue bowel regimen

## 2017-02-21 ENCOUNTER — EMERGENCY (EMERGENCY)
Facility: HOSPITAL | Age: 77
LOS: 1 days | Discharge: HOME | End: 2017-02-21
Attending: EMERGENCY MEDICINE | Admitting: EMERGENCY MEDICINE
Payer: MEDICARE

## 2017-02-21 VITALS
SYSTOLIC BLOOD PRESSURE: 189 MMHG | TEMPERATURE: 98 F | DIASTOLIC BLOOD PRESSURE: 92 MMHG | RESPIRATION RATE: 18 BRPM | HEART RATE: 77 BPM | OXYGEN SATURATION: 100 %

## 2017-02-21 LAB
ALBUMIN SERPL ELPH-MCNC: 4.4 G/DL — SIGNIFICANT CHANGE UP (ref 3.3–5)
ALP SERPL-CCNC: 64 U/L — SIGNIFICANT CHANGE UP (ref 40–120)
ALT FLD-CCNC: 46 U/L — HIGH (ref 4–33)
APPEARANCE UR: CLEAR — SIGNIFICANT CHANGE UP
AST SERPL-CCNC: 51 U/L — HIGH (ref 4–32)
BACTERIA # UR AUTO: HIGH
BASE EXCESS BLDV CALC-SCNC: 0.7 MMOL/L — SIGNIFICANT CHANGE UP
BASE EXCESS BLDV CALC-SCNC: 4.1 MMOL/L — SIGNIFICANT CHANGE UP
BASOPHILS # BLD AUTO: 0.09 K/UL — SIGNIFICANT CHANGE UP (ref 0–0.2)
BASOPHILS NFR BLD AUTO: 0.5 % — SIGNIFICANT CHANGE UP (ref 0–2)
BILIRUB SERPL-MCNC: 1.2 MG/DL — SIGNIFICANT CHANGE UP (ref 0.2–1.2)
BILIRUB UR-MCNC: NEGATIVE — SIGNIFICANT CHANGE UP
BLOOD GAS VENOUS - CREATININE: 0.57 MG/DL — SIGNIFICANT CHANGE UP (ref 0.5–1.3)
BLOOD GAS VENOUS - CREATININE: 0.64 MG/DL — SIGNIFICANT CHANGE UP (ref 0.5–1.3)
BLOOD UR QL VISUAL: NEGATIVE — SIGNIFICANT CHANGE UP
BUN SERPL-MCNC: 22 MG/DL — SIGNIFICANT CHANGE UP (ref 7–23)
CALCIUM SERPL-MCNC: 10.2 MG/DL — SIGNIFICANT CHANGE UP (ref 8.4–10.5)
CHLORIDE BLDV-SCNC: 102 MMOL/L — SIGNIFICANT CHANGE UP (ref 96–108)
CHLORIDE BLDV-SCNC: 105 MMOL/L — SIGNIFICANT CHANGE UP (ref 96–108)
CHLORIDE SERPL-SCNC: 96 MMOL/L — LOW (ref 98–107)
CO2 SERPL-SCNC: 25 MMOL/L — SIGNIFICANT CHANGE UP (ref 22–31)
COLOR SPEC: YELLOW — SIGNIFICANT CHANGE UP
CREAT SERPL-MCNC: 0.72 MG/DL — SIGNIFICANT CHANGE UP (ref 0.5–1.3)
EOSINOPHIL # BLD AUTO: 0.25 K/UL — SIGNIFICANT CHANGE UP (ref 0–0.5)
EOSINOPHIL NFR BLD AUTO: 1.3 % — SIGNIFICANT CHANGE UP (ref 0–6)
GAS PNL BLDV: 148 MMOL/L — HIGH (ref 136–146)
GAS PNL BLDV: 154 MMOL/L — HIGH (ref 136–146)
GLUCOSE BLDV-MCNC: 143 — HIGH (ref 70–99)
GLUCOSE BLDV-MCNC: 167 — HIGH (ref 70–99)
GLUCOSE SERPL-MCNC: 162 MG/DL — HIGH (ref 70–99)
GLUCOSE UR-MCNC: NEGATIVE — SIGNIFICANT CHANGE UP
HCO3 BLDV-SCNC: 24 MMOL/L — SIGNIFICANT CHANGE UP (ref 20–27)
HCO3 BLDV-SCNC: 25 MMOL/L — SIGNIFICANT CHANGE UP (ref 20–27)
HCT VFR BLD CALC: 44.6 % — SIGNIFICANT CHANGE UP (ref 34.5–45)
HCT VFR BLDV CALC: 41.5 % — SIGNIFICANT CHANGE UP (ref 34.5–45)
HCT VFR BLDV CALC: 47.7 % — HIGH (ref 34.5–45)
HGB BLD-MCNC: 15.3 G/DL — SIGNIFICANT CHANGE UP (ref 11.5–15.5)
HGB BLDV-MCNC: 13.5 G/DL — SIGNIFICANT CHANGE UP (ref 11.5–15.5)
HGB BLDV-MCNC: 15.6 G/DL — HIGH (ref 11.5–15.5)
IMM GRANULOCYTES NFR BLD AUTO: 0.3 % — SIGNIFICANT CHANGE UP (ref 0–1.5)
KETONES UR-MCNC: SIGNIFICANT CHANGE UP
LACTATE BLDV-MCNC: 1.9 MMOL/L — SIGNIFICANT CHANGE UP (ref 0.5–2)
LACTATE BLDV-MCNC: 2.4 MMOL/L — HIGH (ref 0.5–2)
LEUKOCYTE ESTERASE UR-ACNC: NEGATIVE — SIGNIFICANT CHANGE UP
LIDOCAIN IGE QN: 46.5 U/L — SIGNIFICANT CHANGE UP (ref 7–60)
LYMPHOCYTES # BLD AUTO: 10.7 % — LOW (ref 13–44)
LYMPHOCYTES # BLD AUTO: 2 K/UL — SIGNIFICANT CHANGE UP (ref 1–3.3)
MCHC RBC-ENTMCNC: 29.6 PG — SIGNIFICANT CHANGE UP (ref 27–34)
MCHC RBC-ENTMCNC: 34.3 % — SIGNIFICANT CHANGE UP (ref 32–36)
MCV RBC AUTO: 86.3 FL — SIGNIFICANT CHANGE UP (ref 80–100)
MONOCYTES # BLD AUTO: 0.52 K/UL — SIGNIFICANT CHANGE UP (ref 0–0.9)
MONOCYTES NFR BLD AUTO: 2.8 % — SIGNIFICANT CHANGE UP (ref 2–14)
MUCOUS THREADS # UR AUTO: SIGNIFICANT CHANGE UP
NEUTROPHILS # BLD AUTO: 15.78 K/UL — HIGH (ref 1.8–7.4)
NEUTROPHILS NFR BLD AUTO: 84.4 % — HIGH (ref 43–77)
NITRITE UR-MCNC: POSITIVE — HIGH
PCO2 BLDV: 48 MMHG — SIGNIFICANT CHANGE UP (ref 41–51)
PCO2 BLDV: 50 MMHG — SIGNIFICANT CHANGE UP (ref 41–51)
PH BLDV: 7.33 PH — SIGNIFICANT CHANGE UP (ref 7.32–7.43)
PH BLDV: 7.4 PH — SIGNIFICANT CHANGE UP (ref 7.32–7.43)
PH UR: 7 — SIGNIFICANT CHANGE UP (ref 4.6–8)
PLATELET # BLD AUTO: 346 K/UL — SIGNIFICANT CHANGE UP (ref 150–400)
PMV BLD: 10.5 FL — SIGNIFICANT CHANGE UP (ref 7–13)
PO2 BLDV: 39 MMHG — SIGNIFICANT CHANGE UP (ref 35–40)
PO2 BLDV: < 24 MMHG — LOW (ref 35–40)
POTASSIUM BLDV-SCNC: 4.3 MMOL/L — SIGNIFICANT CHANGE UP (ref 3.4–4.5)
POTASSIUM BLDV-SCNC: 4.3 MMOL/L — SIGNIFICANT CHANGE UP (ref 3.4–4.5)
POTASSIUM SERPL-MCNC: SIGNIFICANT CHANGE UP MMOL/L (ref 3.5–5.3)
POTASSIUM SERPL-SCNC: SIGNIFICANT CHANGE UP MMOL/L (ref 3.5–5.3)
PROT SERPL-MCNC: 8 G/DL — SIGNIFICANT CHANGE UP (ref 6–8.3)
PROT UR-MCNC: 20 — SIGNIFICANT CHANGE UP
RBC # BLD: 5.17 M/UL — SIGNIFICANT CHANGE UP (ref 3.8–5.2)
RBC # FLD: 13.2 % — SIGNIFICANT CHANGE UP (ref 10.3–14.5)
RBC CASTS # UR COMP ASSIST: SIGNIFICANT CHANGE UP (ref 0–?)
SAO2 % BLDV: 21.9 % — LOW (ref 60–85)
SAO2 % BLDV: 68.5 % — SIGNIFICANT CHANGE UP (ref 60–85)
SODIUM SERPL-SCNC: 139 MMOL/L — SIGNIFICANT CHANGE UP (ref 135–145)
SP GR SPEC: 1.03 — HIGH (ref 1–1.03)
SQUAMOUS # UR AUTO: SIGNIFICANT CHANGE UP
UROBILINOGEN FLD QL: NORMAL E.U. — SIGNIFICANT CHANGE UP (ref 0.1–0.2)
WBC # BLD: 18.7 K/UL — HIGH (ref 3.8–10.5)
WBC # FLD AUTO: 18.7 K/UL — HIGH (ref 3.8–10.5)
WBC UR QL: SIGNIFICANT CHANGE UP (ref 0–?)

## 2017-02-21 PROCEDURE — 99218: CPT | Mod: GC

## 2017-02-21 PROCEDURE — 74177 CT ABD & PELVIS W/CONTRAST: CPT | Mod: 26

## 2017-02-21 PROCEDURE — 76705 ECHO EXAM OF ABDOMEN: CPT | Mod: 26,GC

## 2017-02-21 RX ORDER — SODIUM CHLORIDE 9 MG/ML
1000 INJECTION INTRAMUSCULAR; INTRAVENOUS; SUBCUTANEOUS ONCE
Qty: 0 | Refills: 0 | Status: COMPLETED | OUTPATIENT
Start: 2017-02-21 | End: 2017-02-21

## 2017-02-21 RX ORDER — METFORMIN HYDROCHLORIDE 850 MG/1
500 TABLET ORAL DAILY
Qty: 0 | Refills: 0 | Status: DISCONTINUED | OUTPATIENT
Start: 2017-02-21 | End: 2017-02-25

## 2017-02-21 RX ORDER — HYDROMORPHONE HYDROCHLORIDE 2 MG/ML
1 INJECTION INTRAMUSCULAR; INTRAVENOUS; SUBCUTANEOUS ONCE
Qty: 0 | Refills: 0 | Status: DISCONTINUED | OUTPATIENT
Start: 2017-02-21 | End: 2017-02-21

## 2017-02-21 RX ORDER — ONDANSETRON 8 MG/1
4 TABLET, FILM COATED ORAL ONCE
Qty: 0 | Refills: 0 | Status: COMPLETED | OUTPATIENT
Start: 2017-02-21 | End: 2017-02-21

## 2017-02-21 RX ORDER — LEVOTHYROXINE SODIUM 125 MCG
100 TABLET ORAL DAILY
Qty: 0 | Refills: 0 | Status: DISCONTINUED | OUTPATIENT
Start: 2017-02-21 | End: 2017-02-25

## 2017-02-21 RX ORDER — CEFTRIAXONE 500 MG/1
1 INJECTION, POWDER, FOR SOLUTION INTRAMUSCULAR; INTRAVENOUS ONCE
Qty: 0 | Refills: 0 | Status: COMPLETED | OUTPATIENT
Start: 2017-02-21 | End: 2017-02-21

## 2017-02-21 RX ORDER — MORPHINE SULFATE 50 MG/1
4 CAPSULE, EXTENDED RELEASE ORAL ONCE
Qty: 0 | Refills: 0 | Status: DISCONTINUED | OUTPATIENT
Start: 2017-02-21 | End: 2017-02-21

## 2017-02-21 RX ORDER — CEFTRIAXONE 500 MG/1
1 INJECTION, POWDER, FOR SOLUTION INTRAMUSCULAR; INTRAVENOUS EVERY 12 HOURS
Qty: 0 | Refills: 0 | Status: DISCONTINUED | OUTPATIENT
Start: 2017-02-22 | End: 2017-02-25

## 2017-02-21 RX ORDER — HYDROMORPHONE HYDROCHLORIDE 2 MG/ML
0.5 INJECTION INTRAMUSCULAR; INTRAVENOUS; SUBCUTANEOUS ONCE
Qty: 0 | Refills: 0 | Status: DISCONTINUED | OUTPATIENT
Start: 2017-02-21 | End: 2017-02-21

## 2017-02-21 RX ORDER — ATORVASTATIN CALCIUM 80 MG/1
20 TABLET, FILM COATED ORAL AT BEDTIME
Qty: 0 | Refills: 0 | Status: DISCONTINUED | OUTPATIENT
Start: 2017-02-21 | End: 2017-02-25

## 2017-02-21 RX ADMIN — ONDANSETRON 4 MILLIGRAM(S): 8 TABLET, FILM COATED ORAL at 12:49

## 2017-02-21 RX ADMIN — HYDROMORPHONE HYDROCHLORIDE 1 MILLIGRAM(S): 2 INJECTION INTRAMUSCULAR; INTRAVENOUS; SUBCUTANEOUS at 16:53

## 2017-02-21 RX ADMIN — HYDROMORPHONE HYDROCHLORIDE 0.5 MILLIGRAM(S): 2 INJECTION INTRAMUSCULAR; INTRAVENOUS; SUBCUTANEOUS at 13:52

## 2017-02-21 RX ADMIN — MORPHINE SULFATE 4 MILLIGRAM(S): 50 CAPSULE, EXTENDED RELEASE ORAL at 13:20

## 2017-02-21 RX ADMIN — HYDROMORPHONE HYDROCHLORIDE 1 MILLIGRAM(S): 2 INJECTION INTRAMUSCULAR; INTRAVENOUS; SUBCUTANEOUS at 22:15

## 2017-02-21 RX ADMIN — ATORVASTATIN CALCIUM 20 MILLIGRAM(S): 80 TABLET, FILM COATED ORAL at 22:32

## 2017-02-21 RX ADMIN — CEFTRIAXONE 100 GRAM(S): 500 INJECTION, POWDER, FOR SOLUTION INTRAMUSCULAR; INTRAVENOUS at 19:43

## 2017-02-21 RX ADMIN — HYDROMORPHONE HYDROCHLORIDE 1 MILLIGRAM(S): 2 INJECTION INTRAMUSCULAR; INTRAVENOUS; SUBCUTANEOUS at 16:38

## 2017-02-21 RX ADMIN — HYDROMORPHONE HYDROCHLORIDE 0.5 MILLIGRAM(S): 2 INJECTION INTRAMUSCULAR; INTRAVENOUS; SUBCUTANEOUS at 13:37

## 2017-02-21 RX ADMIN — SODIUM CHLORIDE 1000 MILLILITER(S): 9 INJECTION INTRAMUSCULAR; INTRAVENOUS; SUBCUTANEOUS at 13:28

## 2017-02-21 RX ADMIN — ONDANSETRON 4 MILLIGRAM(S): 8 TABLET, FILM COATED ORAL at 13:12

## 2017-02-21 RX ADMIN — HYDROMORPHONE HYDROCHLORIDE 1 MILLIGRAM(S): 2 INJECTION INTRAMUSCULAR; INTRAVENOUS; SUBCUTANEOUS at 15:50

## 2017-02-21 RX ADMIN — MORPHINE SULFATE 4 MILLIGRAM(S): 50 CAPSULE, EXTENDED RELEASE ORAL at 13:35

## 2017-02-21 RX ADMIN — HYDROMORPHONE HYDROCHLORIDE 1 MILLIGRAM(S): 2 INJECTION INTRAMUSCULAR; INTRAVENOUS; SUBCUTANEOUS at 21:55

## 2017-02-21 RX ADMIN — HYDROMORPHONE HYDROCHLORIDE 1 MILLIGRAM(S): 2 INJECTION INTRAMUSCULAR; INTRAVENOUS; SUBCUTANEOUS at 15:20

## 2017-02-21 NOTE — ED PROVIDER NOTE - PROGRESS NOTE DETAILS
Dr. Martin: Pt was signed out to me awaiting CT. CT shows resolving enteritis. Repeat VBG was ordered. Awaiting UA. Pt states feeling better. with some mild LUQ tenderness. Dr. Martin: Attempted to reach pt's physician Dr. Santiago at [phone], office closed and number given to reach on call at [phone]. On call physician said they will contact Dr. Santiago and given [phone]. Called 3 times over the hour but remained busy. Dr. Martin: Attempted to reach pt's physician Dr. Santiago at [phone], office closed and number given to reach on call at [phone]. On call physician said they will contact Dr. Santiago and given [phone]. Called 3 times over the hour but remained busy. Was able to leave a message at [phone] at around 1800. Dr. Martin: Attempted to reach pt's physician Dr. Cruz at [phone], office closed and number given to reach on call at [phone]. On call physician said they will contact Dr. Cruz and given [phone]. Called 3 times over the hour but remained busy. Was able to leave a message at [phone] at around 1800.

## 2017-02-21 NOTE — ED ADULT NURSE REASSESSMENT NOTE - NS ED NURSE REASSESS COMMENT FT1
Pt vomited 1x during shift and given zofan po and IV push for nausea. Pt reports morphine 4mg IV push did not change her level of pain so MD informed and dilaudid ordered.

## 2017-02-21 NOTE — ED PROCEDURE NOTE - PROCEDURE ADDITIONAL DETAILS
[id number] Ultrasound, abdomen, limited:    Focused ED ultrasound to evaluate for Small Bowel Obstruction  Findings: Visualized bowel within normal limits. Specifically There were less than 3 bowel loops that were over 2.5 cm.  Impression: No evidence of small bowel obstruction

## 2017-02-21 NOTE — ED PROCEDURE NOTE - PROCEDURE ADDITIONAL DETAILS
Focused ED ultrasound FAST exam performed.  Indication: abdominal pain  Abdomen scanned in grey scale in bilateral upper quadrants, pelvis, and subxiphoid.  Right upper quadrant: no abdominal free fluid.  Left upper quadrant: no abdominal free fluid.  Thoracic cavity scanned bilaterally: no hemothorax or effusion.  Subxiphoid view: trace pericardial fluid.    Impression: trace pericardial effusion, no tamponade.  [place]  [id number]

## 2017-02-21 NOTE — ED ADULT NURSE NOTE - OBJECTIVE STATEMENT
Pt is axox3; c/o BL upper abdomen pain that started this morning around 0900 at home. Pt reported that she vomited 2x and felt nauseous on the way to the hospital. Pt reports 10/10 pain that is constant. Heplock put on the left AC # 20. Pt reported being in this hospital 2 weeks ago for abdomen pain as well.

## 2017-02-21 NOTE — ED PROVIDER NOTE - OBJECTIVE STATEMENT
76F with pmh of constipation, mult abdominal sugeries with h/o vomiting last week with ct showing "infection" now has recurrent vomiting x 1 day; no fever; + severe pain this am- suddent onset; no fever; no cough; cp/sob/  + dysuria; + diarrhea this weekend - now resolved; last BM - small amount this am ; + passing gas;     primary: kareem rawls: lactulose, docolaz, levathyroxine, rosvatatin, metformin, meloxicam, vit d, 76F with pmh of constipation, mult abdominal surgeries with h/o vomiting last week with ct showing "infection" now has recurrent vomiting x 1 day; no fever; + severe pain this am- suddent onset; no fever; no cough; cp/sob/  + dysuria; + diarrhea this weekend - now resolved; last BM - small amount this am ; + passing gas;     primary: kareem rawls: lactulose, docolaz, levathyroxine, rosvatatin, metformin, meloxicam, vit d,

## 2017-02-21 NOTE — ED CDU PROVIDER NOTE - OBJECTIVE STATEMENT
76F with pmh of constipation, mult abdominal surgeries with h/o vomiting last week with ct showing "infection" now has recurrent vomiting x 1 day; no fever; + severe pain this am- suddent onset; no fever; no cough; cp/sob/  + dysuria; + diarrhea this weekend - now resolved; last BM - small amount this am ; + passing gas;     primary: Dr. Santiago: lactulose, docolaz, levathyroxine, rosvatatin, metformin, meloxicam, vit d,    CDU Note: Agree with above. 75 y/o female c/o left tamanna abdominal pain. Recent admission for enteritis. CT in ED showing resolving enteritis, UA+ - sent to CDU to treat for pyelo - IV fluids, IV rocephin, pain control, repeat labs am.

## 2017-02-21 NOTE — ED CDU PROVIDER NOTE - PLAN OF CARE
Resolution of infection Follow up with your PMD Dr Cruz within 2-3 days. Call for an appointment -[phone]. Bring copies of your ER lab reports with you to MD appointment. Stay hydrated and get plenty of rest. Take medications as prescribed. Take Keflex 500mg every 6 hours until finished. Continue with your other medications as well. Return to ER if symptoms return or worsen, severe pain, persistent nausea or vomiting occurs, high fever or if other concerns arise. Of note your A!C level was elevated, This will need to be followed with your PMD. Avoid food high in sugar and carbohydrates . read the hand outs provided.

## 2017-02-21 NOTE — ED PROVIDER NOTE - ATTENDING CONTRIBUTION TO CARE
77 yo female here 2/10 with abd pain and vomiting found to have enteritis, pmd started her on lactulose and miralax and today with much more severe pain pain and vomitign no blood // NL vitals afebrile modf distress ao3 rrr s1s2 cta bl abd tender, alexander at rlq, distended no cva or calf tenderness//labs ua ct abd antiemetic reassess

## 2017-02-21 NOTE — ED PROVIDER NOTE - MEDICAL DECISION MAKING DETAILS
mult abdominal surgeries in past, h/o recent enteritis p/w vomiting and severe abdominal pain r/o obstuction, pain control, re-assess

## 2017-02-21 NOTE — ED CDU PROVIDER NOTE - SECONDARY DIAGNOSIS.
Left lower quadrant pain Type 2 diabetes mellitus without complication, without long-term current use of insulin

## 2017-02-21 NOTE — ED CDU PROVIDER NOTE - DETAILS
75 y/o female c/o left sided upper abdominal pain w/ +ua  -treat for pyelo  -iv abx, iv fluids, pain control  -repeat labs am

## 2017-02-21 NOTE — ED CDU PROVIDER NOTE - PROGRESS NOTE DETAILS
CDU - GRZEGORZ Tineo Pt. currently resting comfortably, no complaints at this time. Will continue continue current plan and observation. Stef att discharge summary: 75 yo woman presenting with abdominal pain.  Found to have UTI.  Feels well, no events overnight, pain free, no nausea, vomit, or fever, tolerating PO.  NAD, NCAT, Lungs CTAB, Abd s/nt/nd.  Rx keflex.  Patient informed of ED visit findings, understands plan.  Patient provided with written and further verbal instructions not included in discharge paperwork.  Patient instructed to follow up with their primary care physician in 2-3 days and return for new, worsened, or persistent symptoms. CDU - PA Shawn Tineo Pt. currently resting comfortably, no complaints at this time. Will continue continue current plan and observation.  Stef att: I performed a face to face evaluation of this patient and obtained a history and performed a full exam.  I agree with the history, physical exam and plan of the PA.

## 2017-02-21 NOTE — ED ADULT TRIAGE NOTE - CHIEF COMPLAINT QUOTE
pt c/o left sided abd pain and n/v x 2 weeks.  pt was seen here for same 2 weeks ago and treated for diverticulitis.  pt appears very uncomfortable

## 2017-02-21 NOTE — ED CDU PROVIDER NOTE - ATTENDING CONTRIBUTION TO CARE
Pt was seen and evaluated by me in the ED. Pt presented with LUQ abd pain over the past day after recent laxative use with vomiting. Pt denies any fever, chills, SOB, or chest pain. Pt did have recent enteritis which has resolved on CT. Mild LUQ tenderness. RRR. Lungs CTA b/l. Sent to OBS for repeat abd exams, IV antibiotics for suspected pyelo, and analgesia.

## 2017-02-22 VITALS
SYSTOLIC BLOOD PRESSURE: 123 MMHG | TEMPERATURE: 98 F | DIASTOLIC BLOOD PRESSURE: 55 MMHG | OXYGEN SATURATION: 99 % | RESPIRATION RATE: 16 BRPM | HEART RATE: 67 BPM

## 2017-02-22 LAB
ALBUMIN SERPL ELPH-MCNC: 3.8 G/DL — SIGNIFICANT CHANGE UP (ref 3.3–5)
ALP SERPL-CCNC: 52 U/L — SIGNIFICANT CHANGE UP (ref 40–120)
ALT FLD-CCNC: 33 U/L — SIGNIFICANT CHANGE UP (ref 4–33)
AST SERPL-CCNC: 16 U/L — SIGNIFICANT CHANGE UP (ref 4–32)
BASOPHILS # BLD AUTO: 0.07 K/UL — SIGNIFICANT CHANGE UP (ref 0–0.2)
BASOPHILS NFR BLD AUTO: 0.7 % — SIGNIFICANT CHANGE UP (ref 0–2)
BILIRUB SERPL-MCNC: 0.4 MG/DL — SIGNIFICANT CHANGE UP (ref 0.2–1.2)
BUN SERPL-MCNC: 15 MG/DL — SIGNIFICANT CHANGE UP (ref 7–23)
CALCIUM SERPL-MCNC: 9 MG/DL — SIGNIFICANT CHANGE UP (ref 8.4–10.5)
CHLORIDE SERPL-SCNC: 105 MMOL/L — SIGNIFICANT CHANGE UP (ref 98–107)
CO2 SERPL-SCNC: 23 MMOL/L — SIGNIFICANT CHANGE UP (ref 22–31)
CREAT SERPL-MCNC: 0.67 MG/DL — SIGNIFICANT CHANGE UP (ref 0.5–1.3)
EOSINOPHIL # BLD AUTO: 0.41 K/UL — SIGNIFICANT CHANGE UP (ref 0–0.5)
EOSINOPHIL NFR BLD AUTO: 3.8 % — SIGNIFICANT CHANGE UP (ref 0–6)
GLUCOSE SERPL-MCNC: 116 MG/DL — HIGH (ref 70–99)
HCT VFR BLD CALC: 37.3 % — SIGNIFICANT CHANGE UP (ref 34.5–45)
HGB BLD-MCNC: 12.1 G/DL — SIGNIFICANT CHANGE UP (ref 11.5–15.5)
IMM GRANULOCYTES NFR BLD AUTO: 0.3 % — SIGNIFICANT CHANGE UP (ref 0–1.5)
LYMPHOCYTES # BLD AUTO: 28.7 % — SIGNIFICANT CHANGE UP (ref 13–44)
LYMPHOCYTES # BLD AUTO: 3.08 K/UL — SIGNIFICANT CHANGE UP (ref 1–3.3)
MCHC RBC-ENTMCNC: 28.1 PG — SIGNIFICANT CHANGE UP (ref 27–34)
MCHC RBC-ENTMCNC: 32.4 % — SIGNIFICANT CHANGE UP (ref 32–36)
MCV RBC AUTO: 86.5 FL — SIGNIFICANT CHANGE UP (ref 80–100)
MONOCYTES # BLD AUTO: 0.68 K/UL — SIGNIFICANT CHANGE UP (ref 0–0.9)
MONOCYTES NFR BLD AUTO: 6.3 % — SIGNIFICANT CHANGE UP (ref 2–14)
NEUTROPHILS # BLD AUTO: 6.47 K/UL — SIGNIFICANT CHANGE UP (ref 1.8–7.4)
NEUTROPHILS NFR BLD AUTO: 60.2 % — SIGNIFICANT CHANGE UP (ref 43–77)
PLATELET # BLD AUTO: 306 K/UL — SIGNIFICANT CHANGE UP (ref 150–400)
PMV BLD: 9.8 FL — SIGNIFICANT CHANGE UP (ref 7–13)
POTASSIUM SERPL-MCNC: 4.2 MMOL/L — SIGNIFICANT CHANGE UP (ref 3.5–5.3)
POTASSIUM SERPL-SCNC: 4.2 MMOL/L — SIGNIFICANT CHANGE UP (ref 3.5–5.3)
PROT SERPL-MCNC: 6.1 G/DL — SIGNIFICANT CHANGE UP (ref 6–8.3)
RBC # BLD: 4.31 M/UL — SIGNIFICANT CHANGE UP (ref 3.8–5.2)
RBC # FLD: 13.3 % — SIGNIFICANT CHANGE UP (ref 10.3–14.5)
SODIUM SERPL-SCNC: 141 MMOL/L — SIGNIFICANT CHANGE UP (ref 135–145)
SPECIMEN SOURCE: SIGNIFICANT CHANGE UP
WBC # BLD: 10.74 K/UL — HIGH (ref 3.8–10.5)
WBC # FLD AUTO: 10.74 K/UL — HIGH (ref 3.8–10.5)

## 2017-02-22 PROCEDURE — 99217: CPT | Mod: GC

## 2017-02-22 RX ORDER — IBUPROFEN 200 MG
600 TABLET ORAL ONCE
Qty: 0 | Refills: 0 | Status: COMPLETED | OUTPATIENT
Start: 2017-02-22 | End: 2017-02-22

## 2017-02-22 RX ORDER — ACETAMINOPHEN WITH CODEINE 300MG-30MG
1 TABLET ORAL
Qty: 20 | Refills: 0
Start: 2017-02-22 | End: 2017-02-27

## 2017-02-22 RX ORDER — CEPHALEXIN 500 MG
1 CAPSULE ORAL
Qty: 40 | Refills: 0
Start: 2017-02-22 | End: 2017-03-04

## 2017-02-22 RX ADMIN — CEFTRIAXONE 100 GRAM(S): 500 INJECTION, POWDER, FOR SOLUTION INTRAMUSCULAR; INTRAVENOUS at 06:13

## 2017-02-22 RX ADMIN — Medication 100 MICROGRAM(S): at 06:09

## 2017-02-22 RX ADMIN — Medication 600 MILLIGRAM(S): at 09:57

## 2017-02-22 RX ADMIN — Medication 600 MILLIGRAM(S): at 10:35

## 2017-02-23 LAB
-  AMIKACIN: SIGNIFICANT CHANGE UP
-  AMPICILLIN/SULBACTAM: SIGNIFICANT CHANGE UP
-  AMPICILLIN: SIGNIFICANT CHANGE UP
-  AZTREONAM: SIGNIFICANT CHANGE UP
-  CEFAZOLIN: SIGNIFICANT CHANGE UP
-  CEFEPIME: SIGNIFICANT CHANGE UP
-  CEFOXITIN: SIGNIFICANT CHANGE UP
-  CEFTAZIDIME: SIGNIFICANT CHANGE UP
-  CEFTRIAXONE: SIGNIFICANT CHANGE UP
-  CIPROFLOXACIN: SIGNIFICANT CHANGE UP
-  ERTAPENEM: SIGNIFICANT CHANGE UP
-  GENTAMICIN: SIGNIFICANT CHANGE UP
-  IMIPENEM: SIGNIFICANT CHANGE UP
-  LEVOFLOXACIN: SIGNIFICANT CHANGE UP
-  MEROPENEM: SIGNIFICANT CHANGE UP
-  NITROFURANTOIN: SIGNIFICANT CHANGE UP
-  PIPERACILLIN/TAZOBACTAM: SIGNIFICANT CHANGE UP
-  TIGECYCLINE: SIGNIFICANT CHANGE UP
-  TOBRAMYCIN: SIGNIFICANT CHANGE UP
-  TRIMETHOPRIM/SULFAMETHOXAZOLE: SIGNIFICANT CHANGE UP
BACTERIA UR CULT: SIGNIFICANT CHANGE UP
METHOD TYPE: SIGNIFICANT CHANGE UP
ORGANISM # SPEC MICROSCOPIC CNT: SIGNIFICANT CHANGE UP
ORGANISM # SPEC MICROSCOPIC CNT: SIGNIFICANT CHANGE UP

## 2017-02-23 NOTE — ED POST DISCHARGE NOTE - RESULT SUMMARY
UCX: Gram negative raul > 100,000 prelim. Admin Team to follow results to final. Patient discharged home with a prescription for Keflex 500mg QID X 10 days.

## 2017-02-28 ENCOUNTER — APPOINTMENT (OUTPATIENT)
Dept: GASTROENTEROLOGY | Facility: CLINIC | Age: 77
End: 2017-02-28

## 2017-02-28 VITALS
BODY MASS INDEX: 30.39 KG/M2 | DIASTOLIC BLOOD PRESSURE: 62 MMHG | HEART RATE: 76 BPM | HEIGHT: 64 IN | SYSTOLIC BLOOD PRESSURE: 122 MMHG | RESPIRATION RATE: 16 BRPM | WEIGHT: 178 LBS | TEMPERATURE: 98.3 F | OXYGEN SATURATION: 98 %

## 2017-02-28 DIAGNOSIS — R73.03 PREDIABETES.: ICD-10-CM

## 2017-02-28 DIAGNOSIS — K57.10 DIVERTICULOSIS OF SMALL INTESTINE W/OUT PERFORATION OR ABSCESS W/OUT BLEEDING: ICD-10-CM

## 2017-02-28 DIAGNOSIS — Z87.39 PERSONAL HISTORY OF OTHER DISEASES OF THE MUSCULOSKELETAL SYSTEM AND CONNECTIVE TISSUE: ICD-10-CM

## 2017-02-28 DIAGNOSIS — Z86.718 PERSONAL HISTORY OF OTHER VENOUS THROMBOSIS AND EMBOLISM: ICD-10-CM

## 2017-02-28 DIAGNOSIS — K57.12 DIVERTICULITIS OF SMALL INTESTINE W/OUT PERFORATION OR ABSCESS W/OUT BLEEDING: ICD-10-CM

## 2017-02-28 DIAGNOSIS — Z86.711 PERSONAL HISTORY OF PULMONARY EMBOLISM: ICD-10-CM

## 2017-02-28 DIAGNOSIS — E03.9 HYPOTHYROIDISM, UNSPECIFIED: ICD-10-CM

## 2017-02-28 DIAGNOSIS — Z80.0 FAMILY HISTORY OF MALIGNANT NEOPLASM OF DIGESTIVE ORGANS: ICD-10-CM

## 2017-02-28 DIAGNOSIS — E66.9 OBESITY, UNSPECIFIED: ICD-10-CM

## 2017-02-28 DIAGNOSIS — Z80.1 FAMILY HISTORY OF MALIGNANT NEOPLASM OF TRACHEA, BRONCHUS AND LUNG: ICD-10-CM

## 2017-02-28 DIAGNOSIS — Z87.42 PERSONAL HISTORY OF OTHER DISEASES OF THE FEMALE GENITAL TRACT: ICD-10-CM

## 2017-02-28 DIAGNOSIS — Z86.19 PERSONAL HISTORY OF OTHER INFECTIOUS AND PARASITIC DISEASES: ICD-10-CM

## 2017-02-28 DIAGNOSIS — K21.9 GASTRO-ESOPHAGEAL REFLUX DISEASE W/OUT ESOPHAGITIS: ICD-10-CM

## 2017-02-28 DIAGNOSIS — G47.33 OBSTRUCTIVE SLEEP APNEA (ADULT) (PEDIATRIC): ICD-10-CM

## 2017-03-02 LAB
ALBUMIN SERPL ELPH-MCNC: 4.4 G/DL
ALP BLD-CCNC: 60 U/L
ALT SERPL-CCNC: 25 U/L
ANION GAP SERPL CALC-SCNC: 17 MMOL/L
AST SERPL-CCNC: 18 U/L
BASOPHILS # BLD AUTO: 0.07 K/UL
BASOPHILS NFR BLD AUTO: 0.8 %
BILIRUB SERPL-MCNC: 0.3 MG/DL
BUN SERPL-MCNC: 20 MG/DL
CALCIUM SERPL-MCNC: 9.8 MG/DL
CHLORIDE SERPL-SCNC: 104 MMOL/L
CO2 SERPL-SCNC: 22 MMOL/L
CREAT SERPL-MCNC: 0.72 MG/DL
EOSINOPHIL # BLD AUTO: 0.42 K/UL
EOSINOPHIL NFR BLD AUTO: 4.7 %
GLUCOSE SERPL-MCNC: 98 MG/DL
HCT VFR BLD CALC: 42.9 %
HGB BLD-MCNC: 14.1 G/DL
IMM GRANULOCYTES NFR BLD AUTO: 0.2 %
LYMPHOCYTES # BLD AUTO: 2.85 K/UL
LYMPHOCYTES NFR BLD AUTO: 32 %
MAN DIFF?: NORMAL
MCHC RBC-ENTMCNC: 28.2 PG
MCHC RBC-ENTMCNC: 32.9 GM/DL
MCV RBC AUTO: 85.8 FL
MONOCYTES # BLD AUTO: 0.64 K/UL
MONOCYTES NFR BLD AUTO: 7.2 %
NEUTROPHILS # BLD AUTO: 4.91 K/UL
NEUTROPHILS NFR BLD AUTO: 55.1 %
PLATELET # BLD AUTO: 341 K/UL
POTASSIUM SERPL-SCNC: 4.1 MMOL/L
PROT SERPL-MCNC: 7.4 G/DL
RBC # BLD: 5 M/UL
RBC # FLD: 13.4 %
SODIUM SERPL-SCNC: 143 MMOL/L
WBC # FLD AUTO: 8.91 K/UL

## 2017-03-02 RX ORDER — CIPROFLOXACIN HYDROCHLORIDE 500 MG/1
500 TABLET, FILM COATED ORAL TWICE DAILY
Qty: 20 | Refills: 0 | Status: ACTIVE | COMMUNITY
Start: 2017-03-02 | End: 1900-01-01

## 2017-03-02 RX ORDER — METRONIDAZOLE 500 MG/1
500 TABLET ORAL 3 TIMES DAILY
Qty: 30 | Refills: 0 | Status: ACTIVE | COMMUNITY
Start: 2017-03-02 | End: 1900-01-01

## 2017-03-02 RX ORDER — CIPROFLOXACIN HYDROCHLORIDE 500 MG/1
500 TABLET, FILM COATED ORAL TWICE DAILY
Qty: 20 | Refills: 0 | Status: ACTIVE | OUTPATIENT
Start: 2017-03-02

## 2017-05-10 ENCOUNTER — APPOINTMENT (OUTPATIENT)
Dept: GASTROENTEROLOGY | Facility: CLINIC | Age: 77
End: 2017-05-10

## 2017-05-10 VITALS
RESPIRATION RATE: 14 BRPM | HEART RATE: 53 BPM | SYSTOLIC BLOOD PRESSURE: 130 MMHG | WEIGHT: 181 LBS | TEMPERATURE: 98 F | OXYGEN SATURATION: 98 % | DIASTOLIC BLOOD PRESSURE: 80 MMHG | BODY MASS INDEX: 30.9 KG/M2 | HEIGHT: 64 IN

## 2017-05-10 DIAGNOSIS — Z12.11 ENCOUNTER FOR SCREENING FOR MALIGNANT NEOPLASM OF COLON: ICD-10-CM

## 2017-05-10 DIAGNOSIS — R19.7 DIARRHEA, UNSPECIFIED: ICD-10-CM

## 2017-07-12 ENCOUNTER — APPOINTMENT (OUTPATIENT)
Dept: CT IMAGING | Facility: CLINIC | Age: 77
End: 2017-07-12

## 2017-07-12 ENCOUNTER — OUTPATIENT (OUTPATIENT)
Dept: OUTPATIENT SERVICES | Facility: HOSPITAL | Age: 77
LOS: 1 days | End: 2017-07-12
Payer: MEDICARE

## 2017-07-12 ENCOUNTER — APPOINTMENT (OUTPATIENT)
Dept: GASTROENTEROLOGY | Facility: CLINIC | Age: 77
End: 2017-07-12
Payer: MEDICARE

## 2017-07-12 ENCOUNTER — LABORATORY RESULT (OUTPATIENT)
Age: 77
End: 2017-07-12

## 2017-07-12 VITALS
BODY MASS INDEX: 30.73 KG/M2 | SYSTOLIC BLOOD PRESSURE: 140 MMHG | WEIGHT: 180 LBS | TEMPERATURE: 98 F | OXYGEN SATURATION: 96 % | RESPIRATION RATE: 12 BRPM | HEIGHT: 64 IN | DIASTOLIC BLOOD PRESSURE: 80 MMHG | HEART RATE: 62 BPM

## 2017-07-12 DIAGNOSIS — R10.32 LEFT LOWER QUADRANT PAIN: ICD-10-CM

## 2017-07-12 DIAGNOSIS — K52.9 NONINFECTIVE GASTROENTERITIS AND COLITIS, UNSPECIFIED: ICD-10-CM

## 2017-07-12 DIAGNOSIS — K57.32 DIVERTICULITIS OF LARGE INTESTINE W/OUT PERFORATION OR ABSCESS W/OUT BLEEDING: ICD-10-CM

## 2017-07-12 DIAGNOSIS — R10.33 PERIUMBILICAL PAIN: ICD-10-CM

## 2017-07-12 PROCEDURE — 82565 ASSAY OF CREATININE: CPT

## 2017-07-12 PROCEDURE — 99214 OFFICE O/P EST MOD 30 MIN: CPT | Mod: 25

## 2017-07-12 PROCEDURE — 74177 CT ABD & PELVIS W/CONTRAST: CPT

## 2017-07-12 PROCEDURE — 36415 COLL VENOUS BLD VENIPUNCTURE: CPT

## 2017-07-12 RX ORDER — METRONIDAZOLE 500 MG/1
500 TABLET ORAL 3 TIMES DAILY
Qty: 42 | Refills: 0 | Status: ACTIVE | COMMUNITY
Start: 2017-07-12 | End: 1900-01-01

## 2017-07-12 RX ORDER — CIPROFLOXACIN HYDROCHLORIDE 500 MG/1
500 TABLET, FILM COATED ORAL TWICE DAILY
Qty: 28 | Refills: 0 | Status: ACTIVE | COMMUNITY
Start: 2017-07-12 | End: 1900-01-01

## 2017-07-14 LAB
ALBUMIN SERPL ELPH-MCNC: 4.4 G/DL
ALP BLD-CCNC: 62 U/L
ALT SERPL-CCNC: 28 U/L
ANION GAP SERPL CALC-SCNC: 18 MMOL/L
APPEARANCE: CLEAR
AST SERPL-CCNC: 19 U/L
BASOPHILS # BLD AUTO: 0.04 K/UL
BASOPHILS NFR BLD AUTO: 0.6 %
BILIRUB SERPL-MCNC: 0.3 MG/DL
BILIRUBIN URINE: NEGATIVE
BLOOD URINE: NEGATIVE
BUN SERPL-MCNC: 20 MG/DL
CALCIUM SERPL-MCNC: 10.1 MG/DL
CHLORIDE SERPL-SCNC: 105 MMOL/L
CO2 SERPL-SCNC: 21 MMOL/L
COLOR: YELLOW
CREAT SERPL-MCNC: 1.1 MG/DL
EOSINOPHIL # BLD AUTO: 0.24 K/UL
EOSINOPHIL NFR BLD AUTO: 3.4 %
GLUCOSE QUALITATIVE U: NORMAL MG/DL
GLUCOSE SERPL-MCNC: 116 MG/DL
HCT VFR BLD CALC: 44.8 %
HGB BLD-MCNC: 14.7 G/DL
IMM GRANULOCYTES NFR BLD AUTO: 0.1 %
KETONES URINE: NEGATIVE
LEUKOCYTE ESTERASE URINE: ABNORMAL
LYMPHOCYTES # BLD AUTO: 2.46 K/UL
LYMPHOCYTES NFR BLD AUTO: 35.2 %
MAN DIFF?: NORMAL
MCHC RBC-ENTMCNC: 29.2 PG
MCHC RBC-ENTMCNC: 32.8 GM/DL
MCV RBC AUTO: 88.9 FL
MONOCYTES # BLD AUTO: 0.53 K/UL
MONOCYTES NFR BLD AUTO: 7.6 %
NEUTROPHILS # BLD AUTO: 3.71 K/UL
NEUTROPHILS NFR BLD AUTO: 53.1 %
NITRITE URINE: NEGATIVE
PH URINE: 5.5
PLATELET # BLD AUTO: 267 K/UL
POTASSIUM SERPL-SCNC: 4.6 MMOL/L
PROT SERPL-MCNC: 7.5 G/DL
PROTEIN URINE: NEGATIVE MG/DL
RBC # BLD: 5.04 M/UL
RBC # FLD: 14.1 %
SODIUM SERPL-SCNC: 144 MMOL/L
SPECIFIC GRAVITY URINE: 1.02
UROBILINOGEN URINE: 1 MG/DL
WBC # FLD AUTO: 6.99 K/UL

## 2017-07-27 ENCOUNTER — APPOINTMENT (OUTPATIENT)
Dept: GASTROENTEROLOGY | Facility: HOSPITAL | Age: 77
End: 2017-07-27

## 2017-07-27 ENCOUNTER — OUTPATIENT (OUTPATIENT)
Dept: OUTPATIENT SERVICES | Facility: HOSPITAL | Age: 77
LOS: 1 days | Discharge: HOME | End: 2017-07-27
Payer: MEDICARE

## 2017-07-27 DIAGNOSIS — Z12.11 ENCOUNTER FOR SCREENING FOR MALIGNANT NEOPLASM OF COLON: ICD-10-CM

## 2017-07-27 PROCEDURE — 45378 DIAGNOSTIC COLONOSCOPY: CPT | Mod: GC

## 2018-07-26 PROBLEM — Z80.0 FAMILY HISTORY OF COLON CANCER: Status: INACTIVE | Noted: 2017-02-28

## 2019-11-01 NOTE — DISCHARGE NOTE ADULT - NS AS DC STROKE DX YN
no
posterior cervical R/supraclavicular R/anterior cervical R/supraclavicular L/posterior cervical L/anterior cervical L

## 2020-05-05 NOTE — PHYSICAL THERAPY INITIAL EVALUATION ADULT - LIVES WITH, PROFILE
Bedside and Verbal shift change report given to Leilani (oncoming nurse) by Linnea Beyer (offgoing nurse). Report included the following information SBAR, ED Summary, Intake/Output, MAR and Cardiac Rhythm ST/SR. spouse

## 2020-06-09 ENCOUNTER — INPATIENT (INPATIENT)
Facility: HOSPITAL | Age: 80
LOS: 7 days | Discharge: HOME | End: 2020-06-17
Attending: HOSPITALIST | Admitting: HOSPITALIST
Payer: MEDICARE

## 2020-06-09 VITALS
TEMPERATURE: 98 F | SYSTOLIC BLOOD PRESSURE: 104 MMHG | HEART RATE: 159 BPM | DIASTOLIC BLOOD PRESSURE: 64 MMHG | RESPIRATION RATE: 18 BRPM | OXYGEN SATURATION: 100 %

## 2020-06-09 RX ORDER — METOPROLOL TARTRATE 50 MG
5 TABLET ORAL ONCE
Refills: 0 | Status: COMPLETED | OUTPATIENT
Start: 2020-06-09 | End: 2020-06-09

## 2020-06-09 RX ORDER — ONDANSETRON 8 MG/1
4 TABLET, FILM COATED ORAL ONCE
Refills: 0 | Status: COMPLETED | OUTPATIENT
Start: 2020-06-09 | End: 2020-06-09

## 2020-06-09 RX ORDER — SODIUM CHLORIDE 9 MG/ML
1000 INJECTION INTRAMUSCULAR; INTRAVENOUS; SUBCUTANEOUS ONCE
Refills: 0 | Status: COMPLETED | OUTPATIENT
Start: 2020-06-09 | End: 2020-06-09

## 2020-06-09 RX ORDER — ACETAMINOPHEN 500 MG
650 TABLET ORAL ONCE
Refills: 0 | Status: COMPLETED | OUTPATIENT
Start: 2020-06-09 | End: 2020-06-09

## 2020-06-09 RX ORDER — METOPROLOL TARTRATE 50 MG
50 TABLET ORAL ONCE
Refills: 0 | Status: COMPLETED | OUTPATIENT
Start: 2020-06-09 | End: 2020-06-09

## 2020-06-09 RX ADMIN — Medication 650 MILLIGRAM(S): at 23:45

## 2020-06-09 NOTE — ED PROVIDER NOTE - PROGRESS NOTE DETAILS
Spoke with On call physician from Dr. Adriana Cruz who that's that physician no longer goes to [facility]. Will call the [facility] to admit. Went back into afib with RVR to 140s and then self-converted to NSR to the 60s. No additional meds given. Ordered CT A/P to eval for diverticulitis/colitis given LLQ pain. Will order additional IVF. Went back into afib with RVR to 140s and then self-converted to NSR to the 60s. No additional meds given. Ordered CT A/P to eval for diverticulitis/colitis given LLQ pain. Will order additional IVF. Will consult cardiology for tachy/david and afib with rvr. Went back into afib with RVR to 140s and then self-converted to NSR to the 60s. No additional meds given. Ordered CT A/P to eval for diverticulitis/colitis given LLQ pain. Will order additional IVF. Cardiology consulted for tachy/david and afib with rvr.

## 2020-06-09 NOTE — ED PROVIDER NOTE - ATTENDING CONTRIBUTION TO CARE
Dr. Perez:  I have personally performed a face to face bedside history and physical examination of this patient. I have discussed the history, examination, review of systems, assessment and plan of management with the resident. I have reviewed the electronic medical record and amended it to reflect my history, review of systems, physical exam, assessment and plan.    79F h/o HTN, DM, remote PE no longer on AC, diverticulitis, presents with one day of fever, Tm 102.  Several days ago, had diarrhea but states that has improved.  Denies SOB but noted to be tachypneic.  Denies cp, sob, v/d, urinary symptoms.  +Nausea.    Exam:  - nad  - tachy, irregular  - ctab, tachypneic mildly   -abd soft ntnd    A/P  - fever, eval source of infection; in afib with RVR (no known h/o afib)  - sepsis labs, covid swab, CXR, ekg  - metoprolol, reassess

## 2020-06-09 NOTE — ED PROVIDER NOTE - ADMIT DISPOSITION PRESENT ON ADMISSION SEPSIS
There is a form the patient will need to fill out and sign also to go with the letter of medical necessity and let us know we will grab it out of the letter tab and send for the appeal.     Yes

## 2020-06-09 NOTE — ED PROVIDER NOTE - NS ED ROS FT
CONSTITUTIONAL: No weakness, fevers or chills  EYES/ENT: No visual changes;  No vertigo or throat pain   NECK: No pain or stiffness  RESPIRATORY: No cough, wheezing, hemoptysis; No shortness of breath  CARDIOVASCULAR: No chest pain or palpitations  GASTROINTESTINAL: No abdominal or epigastric pain. No nausea, vomiting, or hematemesis; No diarrhea or constipation. No melena or hematochezia.  GENITOURINARY: No dysuria, frequency or hematuria  NEUROLOGICAL: No numbness or weakness  SKIN: No itching, rashes  MSK: no joint swelling or muscle aches  Endocrine: No dizziness, heat intolerance, hair thining

## 2020-06-09 NOTE — ED PROVIDER NOTE - OBJECTIVE STATEMENT
80 y/o female HX of DM, OA, cdiff?, Hypothyroid, Obesity, HX of REMOTE PE and Phlebitis years ago, HX of Diverticulitis with multiple abdominal surgeries in the past, Hiatal Hernia, PREMA not on CPAP, Spinal Stenosis L/S, High Cholesterol brought from home by EMS c/o fever tmax 102.3, associated w/ fever, chills, and decreased PO intake, was covid swabbed this afternoon however no result. Of note states that on Friday she had multiple episodes of diarrhea. She denies CP, SOB, nausea or vomiting.

## 2020-06-09 NOTE — ED PROVIDER NOTE - CLINICAL SUMMARY MEDICAL DECISION MAKING FREE TEXT BOX
Found to be in afib with rvr on telemetry to the 160s- given Lopressor 5IVP x1 followed by Lopressor 50mg PO x1 with HRs now in the 120s likely driven by sepsis. Febrile to 102 F rectally. Will give abx, IVF, BCx, UCx

## 2020-06-09 NOTE — ED ADULT TRIAGE NOTE - CHIEF COMPLAINT QUOTE
pt brought from home by EMS c/o fever tmax 102.3, associated w/ fever, chills, and decreased PO intake, was covid swabbed this afternoon however no result, presents w/ 22g RAC w/ 250ml NS infusing on presentation, appears in NAD on presentation

## 2020-06-09 NOTE — ED PROVIDER NOTE - PHYSICAL EXAMINATION
PHYSICAL EXAMINATION:  General: Obese female, Alert and Awake, NAD, appears comfortable  HEENT: PERRL, EOMI, No subconjunctival hemorrhages, Oropharynx Clear, MMM  Neck: Supple, No FRANCIE  Cardiac: +Tachycardia, No M/R/G  Resp: CTAB, No Wh/Rh/Ra  Abdomen: NBS, NT/ND, No HSM, No rigidity or guarding  MSK: No LE edema. No stigmata of IE. No evidence of phlebitis. No evidence of synovitis.  : No ritchie  Skin: No rashes or lesions. Skin is warm and dry to the touch.   Neuro: Alert and Awake. CN 2-12 Grossly intact. Moves all four extremities spontaneously.  Psych: Calm, Pleasant, Cooperative

## 2020-06-10 DIAGNOSIS — Z29.9 ENCOUNTER FOR PROPHYLACTIC MEASURES, UNSPECIFIED: ICD-10-CM

## 2020-06-10 DIAGNOSIS — M19.90 UNSPECIFIED OSTEOARTHRITIS, UNSPECIFIED SITE: ICD-10-CM

## 2020-06-10 DIAGNOSIS — E78.5 HYPERLIPIDEMIA, UNSPECIFIED: ICD-10-CM

## 2020-06-10 DIAGNOSIS — G47.30 SLEEP APNEA, UNSPECIFIED: ICD-10-CM

## 2020-06-10 DIAGNOSIS — J96.91 RESPIRATORY FAILURE, UNSPECIFIED WITH HYPOXIA: ICD-10-CM

## 2020-06-10 DIAGNOSIS — I48.91 UNSPECIFIED ATRIAL FIBRILLATION: ICD-10-CM

## 2020-06-10 DIAGNOSIS — A41.9 SEPSIS, UNSPECIFIED ORGANISM: ICD-10-CM

## 2020-06-10 DIAGNOSIS — E11.9 TYPE 2 DIABETES MELLITUS WITHOUT COMPLICATIONS: ICD-10-CM

## 2020-06-10 DIAGNOSIS — E03.9 HYPOTHYROIDISM, UNSPECIFIED: ICD-10-CM

## 2020-06-10 PROBLEM — M48.06 SPINAL STENOSIS, LUMBAR REGION: Chronic | Status: ACTIVE | Noted: 2017-02-08

## 2020-06-10 LAB
ALBUMIN SERPL ELPH-MCNC: 4.2 G/DL — SIGNIFICANT CHANGE UP (ref 3.3–5)
ALP SERPL-CCNC: 45 U/L — SIGNIFICANT CHANGE UP (ref 40–120)
ALT FLD-CCNC: 39 U/L — HIGH (ref 4–33)
ANION GAP SERPL CALC-SCNC: 14 MMO/L — SIGNIFICANT CHANGE UP (ref 7–14)
APPEARANCE UR: CLEAR — SIGNIFICANT CHANGE UP
APTT BLD: 150.5 SEC — CRITICAL HIGH (ref 27.5–36.3)
APTT BLD: 26.5 SEC — LOW (ref 27.5–36.3)
AST SERPL-CCNC: 35 U/L — HIGH (ref 4–32)
BACTERIA # UR AUTO: SIGNIFICANT CHANGE UP
BASE EXCESS BLDV CALC-SCNC: -1.8 MMOL/L — SIGNIFICANT CHANGE UP
BASOPHILS # BLD AUTO: 0.03 K/UL — SIGNIFICANT CHANGE UP (ref 0–0.2)
BASOPHILS NFR BLD AUTO: 0.3 % — SIGNIFICANT CHANGE UP (ref 0–2)
BILIRUB SERPL-MCNC: 0.8 MG/DL — SIGNIFICANT CHANGE UP (ref 0.2–1.2)
BILIRUB UR-MCNC: NEGATIVE — SIGNIFICANT CHANGE UP
BLOOD GAS VENOUS - CREATININE: 0.79 MG/DL — SIGNIFICANT CHANGE UP (ref 0.5–1.3)
BLOOD GAS VENOUS - FIO2: 21 — SIGNIFICANT CHANGE UP
BLOOD UR QL VISUAL: NEGATIVE — SIGNIFICANT CHANGE UP
BUN SERPL-MCNC: 16 MG/DL — SIGNIFICANT CHANGE UP (ref 7–23)
CALCIUM SERPL-MCNC: 8.7 MG/DL — SIGNIFICANT CHANGE UP (ref 8.4–10.5)
CHLORIDE BLDV-SCNC: 102 MMOL/L — SIGNIFICANT CHANGE UP (ref 96–108)
CHLORIDE SERPL-SCNC: 100 MMOL/L — SIGNIFICANT CHANGE UP (ref 98–107)
CK SERPL-CCNC: 86 U/L — SIGNIFICANT CHANGE UP (ref 25–170)
CO2 SERPL-SCNC: 20 MMOL/L — LOW (ref 22–31)
COLOR SPEC: SIGNIFICANT CHANGE UP
CREAT SERPL-MCNC: 0.76 MG/DL — SIGNIFICANT CHANGE UP (ref 0.5–1.3)
CRP SERPL-MCNC: 72.6 MG/L — HIGH
D DIMER BLD IA.RAPID-MCNC: 573 NG/ML — SIGNIFICANT CHANGE UP
E COLI DNA BLD POS QL NAA+NON-PROBE: SIGNIFICANT CHANGE UP
EOSINOPHIL # BLD AUTO: 0.03 K/UL — SIGNIFICANT CHANGE UP (ref 0–0.5)
EOSINOPHIL NFR BLD AUTO: 0.3 % — SIGNIFICANT CHANGE UP (ref 0–6)
FERRITIN SERPL-MCNC: 1061 NG/ML — HIGH (ref 15–150)
GAS PNL BLDV: 133 MMOL/L — LOW (ref 136–146)
GLUCOSE BLDV-MCNC: 147 MG/DL — HIGH (ref 70–99)
GLUCOSE SERPL-MCNC: 142 MG/DL — HIGH (ref 70–99)
GLUCOSE UR-MCNC: NEGATIVE — SIGNIFICANT CHANGE UP
GRAM STN FLD: SIGNIFICANT CHANGE UP
GRAM STN FLD: SIGNIFICANT CHANGE UP
HBA1C BLD-MCNC: 5.9 % — HIGH (ref 4–5.6)
HCO3 BLDV-SCNC: 23 MMOL/L — SIGNIFICANT CHANGE UP (ref 20–27)
HCT VFR BLD CALC: 35.8 % — SIGNIFICANT CHANGE UP (ref 34.5–45)
HCT VFR BLD CALC: 40 % — SIGNIFICANT CHANGE UP (ref 34.5–45)
HCT VFR BLDV CALC: 43.3 % — SIGNIFICANT CHANGE UP (ref 34.5–45)
HGB BLD-MCNC: 12.5 G/DL — SIGNIFICANT CHANGE UP (ref 11.5–15.5)
HGB BLD-MCNC: 13.6 G/DL — SIGNIFICANT CHANGE UP (ref 11.5–15.5)
HGB BLDV-MCNC: 14.1 G/DL — SIGNIFICANT CHANGE UP (ref 11.5–15.5)
HYALINE CASTS # UR AUTO: NEGATIVE — SIGNIFICANT CHANGE UP
IMM GRANULOCYTES NFR BLD AUTO: 0.6 % — SIGNIFICANT CHANGE UP (ref 0–1.5)
INR BLD: 1.08 — SIGNIFICANT CHANGE UP (ref 0.88–1.17)
KETONES UR-MCNC: NEGATIVE — SIGNIFICANT CHANGE UP
LACTATE BLDV-MCNC: 2.2 MMOL/L — HIGH (ref 0.5–2)
LACTATE BLDV-MCNC: 3.1 MMOL/L — HIGH (ref 0.5–2)
LDH SERPL L TO P-CCNC: 166 U/L — SIGNIFICANT CHANGE UP (ref 135–225)
LEUKOCYTE ESTERASE UR-ACNC: SIGNIFICANT CHANGE UP
LYMPHOCYTES # BLD AUTO: 0.67 K/UL — LOW (ref 1–3.3)
LYMPHOCYTES # BLD AUTO: 7.5 % — LOW (ref 13–44)
MCHC RBC-ENTMCNC: 29.4 PG — SIGNIFICANT CHANGE UP (ref 27–34)
MCHC RBC-ENTMCNC: 29.6 PG — SIGNIFICANT CHANGE UP (ref 27–34)
MCHC RBC-ENTMCNC: 34 % — SIGNIFICANT CHANGE UP (ref 32–36)
MCHC RBC-ENTMCNC: 34.9 % — SIGNIFICANT CHANGE UP (ref 32–36)
MCV RBC AUTO: 84.8 FL — SIGNIFICANT CHANGE UP (ref 80–100)
MCV RBC AUTO: 86.6 FL — SIGNIFICANT CHANGE UP (ref 80–100)
METHOD TYPE: SIGNIFICANT CHANGE UP
MONOCYTES # BLD AUTO: 0.15 K/UL — SIGNIFICANT CHANGE UP (ref 0–0.9)
MONOCYTES NFR BLD AUTO: 1.7 % — LOW (ref 2–14)
NEUTROPHILS # BLD AUTO: 7.98 K/UL — HIGH (ref 1.8–7.4)
NEUTROPHILS NFR BLD AUTO: 89.6 % — HIGH (ref 43–77)
NITRITE UR-MCNC: NEGATIVE — SIGNIFICANT CHANGE UP
NRBC # FLD: 0 K/UL — SIGNIFICANT CHANGE UP (ref 0–0)
NRBC # FLD: 0 K/UL — SIGNIFICANT CHANGE UP (ref 0–0)
PCO2 BLDV: 34 MMHG — LOW (ref 41–51)
PH BLDV: 7.43 PH — SIGNIFICANT CHANGE UP (ref 7.32–7.43)
PH UR: 6 — SIGNIFICANT CHANGE UP (ref 5–8)
PLATELET # BLD AUTO: 166 K/UL — SIGNIFICANT CHANGE UP (ref 150–400)
PLATELET # BLD AUTO: 198 K/UL — SIGNIFICANT CHANGE UP (ref 150–400)
PMV BLD: 10 FL — SIGNIFICANT CHANGE UP (ref 7–13)
PMV BLD: 10.5 FL — SIGNIFICANT CHANGE UP (ref 7–13)
PO2 BLDV: 46 MMHG — HIGH (ref 35–40)
POTASSIUM BLDV-SCNC: 3.3 MMOL/L — LOW (ref 3.4–4.5)
POTASSIUM SERPL-MCNC: 3.5 MMOL/L — SIGNIFICANT CHANGE UP (ref 3.5–5.3)
POTASSIUM SERPL-SCNC: 3.5 MMOL/L — SIGNIFICANT CHANGE UP (ref 3.5–5.3)
PROCALCITONIN SERPL-MCNC: 1.25 NG/ML — HIGH (ref 0.02–0.1)
PROT SERPL-MCNC: 6.5 G/DL — SIGNIFICANT CHANGE UP (ref 6–8.3)
PROT UR-MCNC: 30 — SIGNIFICANT CHANGE UP
PROTHROM AB SERPL-ACNC: 12.5 SEC — SIGNIFICANT CHANGE UP (ref 9.8–13.1)
RBC # BLD: 4.22 M/UL — SIGNIFICANT CHANGE UP (ref 3.8–5.2)
RBC # BLD: 4.62 M/UL — SIGNIFICANT CHANGE UP (ref 3.8–5.2)
RBC # FLD: 13 % — SIGNIFICANT CHANGE UP (ref 10.3–14.5)
RBC # FLD: 13.2 % — SIGNIFICANT CHANGE UP (ref 10.3–14.5)
RBC CASTS # UR COMP ASSIST: SIGNIFICANT CHANGE UP (ref 0–?)
SAO2 % BLDV: 84.1 % — SIGNIFICANT CHANGE UP (ref 60–85)
SARS-COV-2 RNA SPEC QL NAA+PROBE: SIGNIFICANT CHANGE UP
SODIUM SERPL-SCNC: 134 MMOL/L — LOW (ref 135–145)
SP GR SPEC: 1.01 — SIGNIFICANT CHANGE UP (ref 1–1.04)
SPECIMEN SOURCE: SIGNIFICANT CHANGE UP
SPECIMEN SOURCE: SIGNIFICANT CHANGE UP
SQUAMOUS # UR AUTO: SIGNIFICANT CHANGE UP
TROPONIN T, HIGH SENSITIVITY: 17 NG/L — SIGNIFICANT CHANGE UP (ref ?–14)
TROPONIN T, HIGH SENSITIVITY: 32 NG/L — SIGNIFICANT CHANGE UP (ref ?–14)
TROPONIN T, HIGH SENSITIVITY: 36 NG/L — SIGNIFICANT CHANGE UP (ref ?–14)
TSH SERPL-MCNC: 1.35 UIU/ML — SIGNIFICANT CHANGE UP (ref 0.27–4.2)
UROBILINOGEN FLD QL: NORMAL — SIGNIFICANT CHANGE UP
WBC # BLD: 8.33 K/UL — SIGNIFICANT CHANGE UP (ref 3.8–10.5)
WBC # BLD: 8.91 K/UL — SIGNIFICANT CHANGE UP (ref 3.8–10.5)
WBC # FLD AUTO: 8.33 K/UL — SIGNIFICANT CHANGE UP (ref 3.8–10.5)
WBC # FLD AUTO: 8.91 K/UL — SIGNIFICANT CHANGE UP (ref 3.8–10.5)
WBC UR QL: HIGH (ref 0–?)

## 2020-06-10 PROCEDURE — 99223 1ST HOSP IP/OBS HIGH 75: CPT

## 2020-06-10 PROCEDURE — 71045 X-RAY EXAM CHEST 1 VIEW: CPT | Mod: 26

## 2020-06-10 PROCEDURE — 71275 CT ANGIOGRAPHY CHEST: CPT | Mod: 26

## 2020-06-10 PROCEDURE — 99223 1ST HOSP IP/OBS HIGH 75: CPT | Mod: GC

## 2020-06-10 PROCEDURE — 71045 X-RAY EXAM CHEST 1 VIEW: CPT | Mod: 26,77

## 2020-06-10 PROCEDURE — 74177 CT ABD & PELVIS W/CONTRAST: CPT | Mod: 26

## 2020-06-10 RX ORDER — ENOXAPARIN SODIUM 100 MG/ML
90 INJECTION SUBCUTANEOUS ONCE
Refills: 0 | Status: COMPLETED | OUTPATIENT
Start: 2020-06-10 | End: 2020-06-10

## 2020-06-10 RX ORDER — FUROSEMIDE 40 MG
40 TABLET ORAL ONCE
Refills: 0 | Status: COMPLETED | OUTPATIENT
Start: 2020-06-10 | End: 2020-06-10

## 2020-06-10 RX ORDER — ACETAMINOPHEN 500 MG
650 TABLET ORAL EVERY 4 HOURS
Refills: 0 | Status: DISCONTINUED | OUTPATIENT
Start: 2020-06-10 | End: 2020-06-10

## 2020-06-10 RX ORDER — DEXTROSE 50 % IN WATER 50 %
25 SYRINGE (ML) INTRAVENOUS ONCE
Refills: 0 | Status: DISCONTINUED | OUTPATIENT
Start: 2020-06-10 | End: 2020-06-17

## 2020-06-10 RX ORDER — HEPARIN SODIUM 5000 [USP'U]/ML
7000 INJECTION INTRAVENOUS; SUBCUTANEOUS ONCE
Refills: 0 | Status: COMPLETED | OUTPATIENT
Start: 2020-06-10 | End: 2020-06-10

## 2020-06-10 RX ORDER — PIPERACILLIN AND TAZOBACTAM 4; .5 G/20ML; G/20ML
3.38 INJECTION, POWDER, LYOPHILIZED, FOR SOLUTION INTRAVENOUS ONCE
Refills: 0 | Status: COMPLETED | OUTPATIENT
Start: 2020-06-10 | End: 2020-06-10

## 2020-06-10 RX ORDER — SODIUM CHLORIDE 9 MG/ML
1000 INJECTION INTRAMUSCULAR; INTRAVENOUS; SUBCUTANEOUS ONCE
Refills: 0 | Status: DISCONTINUED | OUTPATIENT
Start: 2020-06-10 | End: 2020-06-10

## 2020-06-10 RX ORDER — ENOXAPARIN SODIUM 100 MG/ML
90 INJECTION SUBCUTANEOUS EVERY 12 HOURS
Refills: 0 | Status: DISCONTINUED | OUTPATIENT
Start: 2020-06-10 | End: 2020-06-10

## 2020-06-10 RX ORDER — PIPERACILLIN AND TAZOBACTAM 4; .5 G/20ML; G/20ML
3.38 INJECTION, POWDER, LYOPHILIZED, FOR SOLUTION INTRAVENOUS EVERY 8 HOURS
Refills: 0 | Status: DISCONTINUED | OUTPATIENT
Start: 2020-06-11 | End: 2020-06-12

## 2020-06-10 RX ORDER — HEPARIN SODIUM 5000 [USP'U]/ML
7000 INJECTION INTRAVENOUS; SUBCUTANEOUS EVERY 6 HOURS
Refills: 0 | Status: DISCONTINUED | OUTPATIENT
Start: 2020-06-10 | End: 2020-06-10

## 2020-06-10 RX ORDER — DEXTROSE 50 % IN WATER 50 %
12.5 SYRINGE (ML) INTRAVENOUS ONCE
Refills: 0 | Status: DISCONTINUED | OUTPATIENT
Start: 2020-06-10 | End: 2020-06-17

## 2020-06-10 RX ORDER — SODIUM CHLORIDE 9 MG/ML
1000 INJECTION INTRAMUSCULAR; INTRAVENOUS; SUBCUTANEOUS
Refills: 0 | Status: DISCONTINUED | OUTPATIENT
Start: 2020-06-10 | End: 2020-06-10

## 2020-06-10 RX ORDER — HEPARIN SODIUM 5000 [USP'U]/ML
3500 INJECTION INTRAVENOUS; SUBCUTANEOUS EVERY 6 HOURS
Refills: 0 | Status: DISCONTINUED | OUTPATIENT
Start: 2020-06-10 | End: 2020-06-10

## 2020-06-10 RX ORDER — SODIUM CHLORIDE 9 MG/ML
1000 INJECTION, SOLUTION INTRAVENOUS
Refills: 0 | Status: DISCONTINUED | OUTPATIENT
Start: 2020-06-10 | End: 2020-06-17

## 2020-06-10 RX ORDER — GLUCAGON INJECTION, SOLUTION 0.5 MG/.1ML
1 INJECTION, SOLUTION SUBCUTANEOUS ONCE
Refills: 0 | Status: DISCONTINUED | OUTPATIENT
Start: 2020-06-10 | End: 2020-06-17

## 2020-06-10 RX ORDER — ASPIRIN/CALCIUM CARB/MAGNESIUM 324 MG
162 TABLET ORAL ONCE
Refills: 0 | Status: COMPLETED | OUTPATIENT
Start: 2020-06-10 | End: 2020-06-10

## 2020-06-10 RX ORDER — ENOXAPARIN SODIUM 100 MG/ML
90 INJECTION SUBCUTANEOUS
Refills: 0 | Status: DISCONTINUED | OUTPATIENT
Start: 2020-06-10 | End: 2020-06-11

## 2020-06-10 RX ORDER — HEPARIN SODIUM 5000 [USP'U]/ML
INJECTION INTRAVENOUS; SUBCUTANEOUS
Qty: 25000 | Refills: 0 | Status: DISCONTINUED | OUTPATIENT
Start: 2020-06-10 | End: 2020-06-10

## 2020-06-10 RX ORDER — DEXTROSE 50 % IN WATER 50 %
15 SYRINGE (ML) INTRAVENOUS ONCE
Refills: 0 | Status: DISCONTINUED | OUTPATIENT
Start: 2020-06-10 | End: 2020-06-17

## 2020-06-10 RX ORDER — INSULIN LISPRO 100/ML
VIAL (ML) SUBCUTANEOUS AT BEDTIME
Refills: 0 | Status: DISCONTINUED | OUTPATIENT
Start: 2020-06-10 | End: 2020-06-17

## 2020-06-10 RX ORDER — METOPROLOL TARTRATE 50 MG
5 TABLET ORAL ONCE
Refills: 0 | Status: COMPLETED | OUTPATIENT
Start: 2020-06-10 | End: 2020-06-10

## 2020-06-10 RX ORDER — ACETAMINOPHEN 500 MG
1000 TABLET ORAL ONCE
Refills: 0 | Status: COMPLETED | OUTPATIENT
Start: 2020-06-10 | End: 2020-06-10

## 2020-06-10 RX ORDER — METOPROLOL TARTRATE 50 MG
25 TABLET ORAL
Refills: 0 | Status: DISCONTINUED | OUTPATIENT
Start: 2020-06-10 | End: 2020-06-10

## 2020-06-10 RX ORDER — METOPROLOL TARTRATE 50 MG
12.5 TABLET ORAL
Refills: 0 | Status: DISCONTINUED | OUTPATIENT
Start: 2020-06-10 | End: 2020-06-11

## 2020-06-10 RX ORDER — INSULIN LISPRO 100/ML
VIAL (ML) SUBCUTANEOUS
Refills: 0 | Status: DISCONTINUED | OUTPATIENT
Start: 2020-06-10 | End: 2020-06-17

## 2020-06-10 RX ORDER — ALBUTEROL 90 UG/1
2 AEROSOL, METERED ORAL EVERY 6 HOURS
Refills: 0 | Status: DISCONTINUED | OUTPATIENT
Start: 2020-06-10 | End: 2020-06-17

## 2020-06-10 RX ADMIN — Medication 25 MILLIGRAM(S): at 09:45

## 2020-06-10 RX ADMIN — ENOXAPARIN SODIUM 90 MILLIGRAM(S): 100 INJECTION SUBCUTANEOUS at 23:12

## 2020-06-10 RX ADMIN — HEPARIN SODIUM 1600 UNIT(S)/HR: 5000 INJECTION INTRAVENOUS; SUBCUTANEOUS at 04:48

## 2020-06-10 RX ADMIN — SODIUM CHLORIDE 1000 MILLILITER(S): 9 INJECTION INTRAMUSCULAR; INTRAVENOUS; SUBCUTANEOUS at 01:41

## 2020-06-10 RX ADMIN — Medication 12.5 MILLIGRAM(S): at 22:26

## 2020-06-10 RX ADMIN — Medication 5 MILLIGRAM(S): at 00:24

## 2020-06-10 RX ADMIN — Medication 162 MILLIGRAM(S): at 01:41

## 2020-06-10 RX ADMIN — Medication 1: at 09:47

## 2020-06-10 RX ADMIN — Medication 2: at 12:30

## 2020-06-10 RX ADMIN — ENOXAPARIN SODIUM 90 MILLIGRAM(S): 100 INJECTION SUBCUTANEOUS at 14:02

## 2020-06-10 RX ADMIN — Medication 40 MILLIGRAM(S): at 06:32

## 2020-06-10 RX ADMIN — SODIUM CHLORIDE 1000 MILLILITER(S): 9 INJECTION INTRAMUSCULAR; INTRAVENOUS; SUBCUTANEOUS at 00:13

## 2020-06-10 RX ADMIN — PIPERACILLIN AND TAZOBACTAM 200 GRAM(S): 4; .5 INJECTION, POWDER, LYOPHILIZED, FOR SOLUTION INTRAVENOUS at 19:35

## 2020-06-10 RX ADMIN — Medication 50 MILLIGRAM(S): at 00:12

## 2020-06-10 RX ADMIN — SODIUM CHLORIDE 125 MILLILITER(S): 9 INJECTION INTRAMUSCULAR; INTRAVENOUS; SUBCUTANEOUS at 01:41

## 2020-06-10 RX ADMIN — HEPARIN SODIUM 7000 UNIT(S): 5000 INJECTION INTRAVENOUS; SUBCUTANEOUS at 04:47

## 2020-06-10 RX ADMIN — Medication 5 MILLIGRAM(S): at 00:13

## 2020-06-10 RX ADMIN — Medication 400 MILLIGRAM(S): at 11:38

## 2020-06-10 RX ADMIN — Medication 400 MILLIGRAM(S): at 22:25

## 2020-06-10 NOTE — H&P ADULT - ATTENDING COMMENTS
80 yo F with a PMHx of T2DM, OA, hypothyroid, obesity, remote h/o PE p/w hypoxic respiratory failure and sepsis likely 2/2 to COVID infection c/b new onset afib. On my evaluation, pt tachypneic with increased work of breathing and audible expiratory wheeze. Nurse reported pt had recently ambulated. Also was being bolused 1L. IVF d/c, lasix x1 to be administered given concern for flash pulm edema. Currently on 6L w/ stable sats. Continue with continuous pulse ox. Started on heparin gtt for new onset afib given elevated chadsvasc. Check d-dimer- if elevated, may need to r/o PE. Cards recs appreciate, TTE in AM. Rest of management as above

## 2020-06-10 NOTE — H&P ADULT - NSHPLABSRESULTS_GEN_ALL_CORE
13.6   8.91  )-----------( 198      ( 2020 23:37 )             40.0       06-09    134<L>  |  100  |  16  ----------------------------<  142<H>  3.5   |  20<L>  |  0.76    Ca    8.7      2020 23:37    TPro  6.5  /  Alb  4.2  /  TBili  0.8  /  DBili  x   /  AST  35<H>  /  ALT  39<H>  /  AlkPhos  45  -              Urinalysis Basic - ( 10 Hair 2020 00:05 )    Color: LIGHT YELLOW / Appearance: CLEAR / S.010 / pH: 6.0  Gluc: NEGATIVE / Ketone: NEGATIVE  / Bili: NEGATIVE / Urobili: NORMAL   Blood: NEGATIVE / Protein: 30 / Nitrite: NEGATIVE   Leuk Esterase: MODERATE / RBC: 0-2 / WBC 11-25   Sq Epi: FEW / Non Sq Epi: x / Bacteria: FEW    PT/INR - ( 2020 23:37 )   PT: 12.5 SEC;   INR: 1.08        PTT - ( 2020 23:37 )  PTT:26.5 SEC    Lactate Trend    CAPILLARY BLOOD GLUCOSE    < from: Xray Chest 1 View-PORTABLE IMMEDIATE (06.10.20 @ 00:23) >    FINDINGS:    Lines and Tubes: None.    Lungs: Increased bilateral lung markings. No focal consolidation.    Pleura: Elevated right hemidiaphragm. No pleural effusion.    Mediastinum: Heart size cannot be assessed on this view.    Skeletal: Unremarkable.      IMPRESSION:    No focal consolidation.    < end of copied text >  < from: CT Abdomen and Pelvis w/ IV Cont (06.10.20 @ 03:16) >    IMPRESSION:    Small bowel and colonic diverticulosis without acute diverticulitis. Bowel postoperative changes without obstruction or extraluminal collection.    Mild bilateral peripheral ground glass opacities are nonspecific and may be seen in the setting of viral pneumonia such as COVID-19. Correlate with clinical parameters and laboratory values. Consider short-term chest radiograph follow-up as indicated.    Additional findings as mentioned above.    These results were discussed via telephone at 6/10/2020 3:54 AM by Dr. Paniagua of radiology with Dr. George, institution read-back verification policy was followed.    < end of copied text > 13.6   8.91  )-----------( 198      ( 2020 23:37 )             40.0       06-09    134<L>  |  100  |  16  ----------------------------<  142<H>  3.5   |  20<L>  |  0.76    Ca    8.7      2020 23:37    TPro  6.5  /  Alb  4.2  /  TBili  0.8  /  DBili  x   /  AST  35<H>  /  ALT  39<H>  /  AlkPhos  45                Urinalysis Basic - ( 10 Hair 2020 00:05 )    Color: LIGHT YELLOW / Appearance: CLEAR / S.010 / pH: 6.0  Gluc: NEGATIVE / Ketone: NEGATIVE  / Bili: NEGATIVE / Urobili: NORMAL   Blood: NEGATIVE / Protein: 30 / Nitrite: NEGATIVE   Leuk Esterase: MODERATE / RBC: 0-2 / WBC 11-25   Sq Epi: FEW / Non Sq Epi: x / Bacteria: FEW    PT/INR - ( 2020 23:37 )   PT: 12.5 SEC;   INR: 1.08        PTT - ( 2020 23:37 )  PTT:26.5 SEC    Lactate Trend    CAPILLARY BLOOD GLUCOSE    < from: Xray Chest 1 View-PORTABLE IMMEDIATE (06.10.20 @ 00:23) >    FINDINGS:    Lines and Tubes: None.    Lungs: Increased bilateral lung markings. No focal consolidation.    Pleura: Elevated right hemidiaphragm. No pleural effusion.    Mediastinum: Heart size cannot be assessed on this view.    Skeletal: Unremarkable.      IMPRESSION:    No focal consolidation.    < end of copied text >  < from: CT Abdomen and Pelvis w/ IV Cont (06.10.20 @ 03:16) >    IMPRESSION:    Small bowel and colonic diverticulosis without acute diverticulitis. Bowel postoperative changes without obstruction or extraluminal collection.    Mild bilateral peripheral ground glass opacities are nonspecific and may be seen in the setting of viral pneumonia such as COVID-19. Correlate with clinical parameters and laboratory values. Consider short-term chest radiograph follow-up as indicated.    Additional findings as mentioned above.    These results were discussed via telephone at 6/10/2020 3:54 AM by Dr. Paniagua of radiology with Dr. George, institution read-back verification policy was followed.    < end of copied text >    EKG: Initial EKG showing afib with RVR to the 150s, subsequent EKGs showing sinus david to high 50s.

## 2020-06-10 NOTE — H&P ADULT - PROBLEM SELECTOR PLAN 3
New onset afib rvr  -heparin gtt started  -montior rate New onset afib rvr s/p 5mg IV metop x2 then 50mg po. Now in upper 50s sinus  -heparin gtt started  -tele  -cards recs New onset afib rvr s/p 5mg IV metop x2 then 50mg po. Now in upper 50s sinus  [id number]  -heparin gtt started  -tele  -cards recs  -TTE  -trend trops, likely elevated 2/2 demand ischemia in setting of afib rvr -f/u COVID swab, management as above  -check covid labs crp, ferritin, d dimer, lactate

## 2020-06-10 NOTE — H&P ADULT - NSHPREVIEWOFSYSTEMS_GEN_ALL_CORE
REVIEW OF SYSTEMS:  CONSTITUTIONAL: +fevers and chills  EYES: No visual changes  ENT: No vertigo or throat pain   NECK: No pain or stiffness  RESPIRATORY: No cough, wheezing, hemoptysis; No shortness of breath  CARDIOVASCULAR: No chest pain or palpitations  GASTROINTESTINAL: Poor appetite.  GENITOURINARY: No dysuria, frequency or hematuria  NEUROLOGICAL: No numbness or weakness  SKIN: No itching, burning, rashes, or lesions   LYMPHATICS: No swollen lymph nodes.  All other review of systems is negative unless indicated above.

## 2020-06-10 NOTE — PROVIDER CONTACT NOTE (CRITICAL VALUE NOTIFICATION) - BACKGROUND
80 yo F with a PMHx of T2DM, OA, hypothyroid, obesity, remote h/o PE, diverticulitis s/p multiple abdominal surgeries, hiatal hernia, PREMA, lumbar spinal stenosis, HLD who p/w fever (Tm 102.3), chills and poor appetite likely 2/2 COVID-19 course c/b afib rvr.

## 2020-06-10 NOTE — H&P ADULT - NSHPSOCIALHISTORY_GEN_ALL_CORE
Lives with her . Has hearing aid. Retired. Never smoker. 1 glass of wine nightly. No recreational drugs.

## 2020-06-10 NOTE — CHART NOTE - NSCHARTNOTEFT_GEN_A_CORE
Patient seen and examined. Chart and labs reviewed.    Breathing comfortably on supplemental oxygen. HR at goal, though was bradycardic earlier (asymptomatic). Cardiology recs reviewed.    Will change heparin gtt to therapeutic enoxaparin to reduce blood draws and enable patient mobility.     D-dimer is elevated. Patient is on therapeutic Lovenox for new atrial fibrillation, CHADS-VASC of 4. CTA chest will not immediately , but would inform urgency of anticoagulation.    Will start metoprolol tartrate at lower dose: 25mg bid with holding parameters.    Awaiting COVID PCR.

## 2020-06-10 NOTE — H&P ADULT - PROBLEM SELECTOR PLAN 9
DVT PPx: on heparin gtt  Diet:   Transitions of Care Status:  1.  Name of PCP:  2.  PCP Contacted on Admission: [ ] Y    [ ] N    3.  PCP contacted at Discharge: [ ] Y    [ ] N    [ ] N/A  4.  Post-Discharge Appointment Date and Location:  5.  Summary of Handoff given to PCP: DVT PPx: on heparin gtt  Diet: CC  Transitions of Care Status:  1.  Name of PCP:  2.  PCP Contacted on Admission: [ ] Y    [ ] N    3.  PCP contacted at Discharge: [ ] Y    [ ] N    [ ] N/A  4.  Post-Discharge Appointment Date and Location:  5.  Summary of Handoff given to PCP: Pain control PRN with NSAIDs

## 2020-06-10 NOTE — H&P ADULT - NSHPPHYSICALEXAM_GEN_ALL_CORE
Adopting ED physical exam per COVID-19 pandemic protocol. Appearance: in moderate respiratory distress	  Cardiovascular: Normal S1 S2, No JVD, No murmurs, No edema  Respiratory: Expiratory wheeze, tachypneic with use of accessory muscles 	  Gastrointestinal:  Soft, Non-tender, + BS	  Skin: No rashes, No ecchymoses, No cyanosis	  Neurologic: Non-focal, A&Ox3, nonfocal, WHITNEY x 4  Extremities: Normal range of motion, No clubbing, cyanosis or edema  Vascular: Peripheral pulses palpable 2+ bilaterally  Psychiatry: A & O x 3, Mood & affect appropriate

## 2020-06-10 NOTE — CHART NOTE - NSCHARTNOTEFT_GEN_A_CORE
Blood cultures positive for GNR.   Covid pending.   Tmax 102.2 at 7:26 today.    Discussed with Dr. Linda, will start pt on zosyn.  Will continue to monitor.

## 2020-06-10 NOTE — ED ADULT NURSE REASSESSMENT NOTE - NS ED NURSE REASSESS COMMENT FT1
post ambulation patient became short of breath. MD at bedside, placed patient on 6L nasal cannula. Oxygen saturation 100%.

## 2020-06-10 NOTE — H&P ADULT - HISTORY OF PRESENT ILLNESS
80 yo F with a PMHx of T2DM, OA, hypothyroid, obesity, remote h/o PE, diverticulitis s/p multiple abdominal surgeries, hiatal hernia, PREMA, lumbar spinal stenosis, HLD who p/w fever (Tm 102.3), chills and poor appetite. Had an outpatient covid swab this afternoon (result unknown). Endorses multiple episodes of diarrhea.     ED Course:  Tm 102.8, , /76, RR 25, SpO2 99% NC @2L  Had an episode of afib RVR s/p 5mg IV metop x2, 50mg PO metop x1. Also given 650mg PO acetaminophen for Tm 102.8. 162mg ASA and 1L NS. Cardiology consulted.  Lactate 3.1->2.2    CT A/P: small bowel diverticulosis. Mild bilateral ground glass opacities of the Lower lungs concerning for COVID.     CXR:  Increased bilateral lung markings. No focal consolidation. 80 yo F with a PMHx of T2DM, OA, hypothyroid, obesity, remote h/o PE, diverticulitis s/p multiple abdominal surgeries, hiatal hernia, PREMA, lumbar spinal stenosis, HLD who p/w fever (Tm 102.3), chills and poor appetite x2 days. Also endorsing diffuse body ache. Had an outpatient covid swab this afternoon (result unknown). Endorses multiple episodes of diarrhea last Friday. Unsure of her medication dosages.     ED Course:  Tm 102.8, , /76, RR 25, SpO2 99% NC @2L  Had an episode of afib RVR s/p 5mg IV metop x2, 50mg PO metop x1. Also given 650mg PO acetaminophen for Tm 102.8. 162mg ASA and 1L NS. Cardiology consulted.  Lactate 3.1->2.2    CT A/P: small bowel diverticulosis. Mild bilateral ground glass opacities of the Lower lungs concerning for COVID.     CXR:  Increased bilateral lung markings. No focal consolidation. 80 yo F with a PMHx of T2DM, OA, hypothyroid, obesity, remote h/o PE, diverticulitis s/p multiple abdominal surgeries, hiatal hernia, PREMA, lumbar spinal stenosis, HLD who p/w fever (Tm 102.3), chills and poor appetite x2 days. Also endorsing diffuse body ache. Had an outpatient covid swab this afternoon (result unknown). Endorses multiple episodes of diarrhea last Friday. Unsure of her medication dosages. States she has not traveled, has been out in the community, but wears a mask and gloves. No sick contacts. No sore throat, cough, or sob. No dysuria.     ED Course:  Tm 102.8, , /76, RR 25, SpO2 99% NC @2L  Had an episode of afib RVR  150s s/p 5mg IV metop x2, 50mg PO metop x1. Also given 650mg PO acetaminophen for Tm 102.8. 162mg ASA and 1L NS. Cardiology consulted.  Lactate 3.1->2.2    CT A/P: small bowel diverticulosis. Mild bilateral ground glass opacities of the Lower lungs concerning for COVID.     CXR:  Increased bilateral lung markings. No focal consolidation.

## 2020-06-10 NOTE — ED ADULT NURSE NOTE - OBJECTIVE STATEMENT
Wife call back and instructed    Patient brought from home for episode of palpitations at 2225 and tachypnea as well as SOB. Patient presents with fever, chills to exam room. Also informed was covid swabbed earlier today. Patient has afib on cardiac monitor. Also endorsees episodes of diarrhea  x 4 days earlier.  MD at bedside. IV in place RT arm from ems, +blood flow, no swelling/edema/redness to site.

## 2020-06-10 NOTE — CONSULT NOTE ADULT - ASSESSMENT
78 y/o female PMH DM, HLD, OA, cdiff?, Hypothyroid, Obesity, REMOTE PE and Phlebitis years ago, HX of Diverticulitis with multiple abdominal surgeries, Hiatal Hernia, PREMA not on CPAP, Spinal Stenosis L/S, brought from home by EMS c/o fever tmax 102.3, associated w/ fever, chills, and decreased PO intake,. Also endorses that on Friday she had multiple episodes of diarrhea. Now noted to be in and out of Afib rvr and sinus david HR 55-67 in the setting of infectious process.

## 2020-06-10 NOTE — CONSULT NOTE ADULT - PROBLEM SELECTOR RECOMMENDATION 9
Atrial fibrillation with RVR   Pt was in Afib, now NSR.     CHADSVASC score:  5  Has Bled score: 2  Monitor on telemetry  Rate control Metoprolol   Heparin gtt full anticoagulation   Admission labs include serial cardiac enzymes, risk factor profile to include TSH, HGBA1c, Lipid profile, CMP, Mag, Phos, CBC, pBNP  EKG, PRN chest pain  Echo in AM to R/O thrombus and evaluate LV function and wall motion abnormality

## 2020-06-10 NOTE — H&P ADULT - ASSESSMENT
80 yo F with a PMHx of T2DM, OA, hypothyroid, obesity, remote h/o PE, diverticulitis s/p multiple abdominal surgeries, hiatal hernia, PREMA, lumbar spinal stenosis, HLD who p/w fever (Tm 102.3), chills and poor appetite likely 2/2 COVID-19 course c/b afib rvr. 78 yo F with a PMHx of T2DM, OA, hypothyroid, obesity, remote h/o PE, diverticulitis s/p multiple abdominal surgeries, hiatal hernia, PREMA, lumbar spinal stenosis, HLD who p/w fever (Tm 102.3), body ache, chills and poor appetite likely 2/2 COVID-19 course c/b afib rvr.

## 2020-06-10 NOTE — CONSULT NOTE ADULT - SUBJECTIVE AND OBJECTIVE BOX
HISTORY OF PRESENT ILLNESS:  Patient is a 79y old  Female who presents with a chief complaint of   HPI: 78 y/o female PMH DM, HLD, OA, cdiff?, Hypothyroid, Obesity, REMOTE PE and Phlebitis years ago, HX of Diverticulitis with multiple abdominal surgeries, Hiatal Hernia, PREMA not on CPAP, Spinal Stenosis L/S, brought from home by EMS c/o fever tmax 102.3, associated w/ fever, chills, and decreased PO intake,. Also endorses that on Friday she had multiple episodes of diarrhea. In ED noted to be in Atrial fibrillation with RVR. Pt given metoprolol IVP x 2. Pt noted to be going in and out of Afib rvr and sinus david HR 55-67. Cardiology called for afib rvr. Denies CP, SOB, nausea or vomiting.      ED Course: In ER, patient found to have T max 102.3 , HR 50s , /76 , SpO2 98% . Labs remarkable for     Allergies    No Known Allergies    Intolerances    	    MEDICATIONS:  heparin   Injectable 7000 Unit(s) IV Push once  heparin   Injectable 7000 Unit(s) IV Push every 6 hours PRN  heparin   Injectable 3500 Unit(s) IV Push every 6 hours PRN  heparin  Infusion.  Unit(s)/Hr IV Continuous <Continuous>  sodium chloride 0.9%. 1000 milliLiter(s) IV Continuous <Continuous>      PAST MEDICAL & SURGICAL HISTORY:  DM (diabetes mellitus)  Spinal stenosis of lumbar region  Sleep Apnea  Meniscus tear lateral left knee  h/o Prolapsed Uterus  h/o Pulmonary Embolus 30 yrs ago  h/o Phlebitis 30 yrs ago  h/o Cholecystitis  Hiatal Hernia  Hyperlipidemia  Arthritis  Diverticulitis  Adult Hypothyroidism  Right hand surgery  , h/o arthritis  Left hand surgery 2004: h/o arthritis hand  h/o cholecystectomy    S/P Hysterectomy  S/P Appendectomy  S/P Colon Resection      FAMILY HISTORY:  No pertinent family history in first degree relatives      SOCIAL HISTORY:      Smoker: [ ] Active [x ] never  [ ] previous  Alcohol:  [x ] social [ ] daily [ ] never  Lives with:       REVIEW OF SYSTEMS  See HPI. Otherwise, 10 point ROS done and otherwise negative.  >>> <<<    PHYSICAL EXAM:  T(C): 37.1 (06-10-20 @ 02:03), Max: 39.3 (20 @ 23:40)  HR: 70 (06-10-20 @ 02:03) (58 - 159)  BP: 115/37 (06-10-20 @ 02:03) (104/64 - 144/76)  RR: 20 (06-10-20 @ 02:03) (18 - 27)  SpO2: 98% (06-10-20 @ 02:03) (96% - 100%)  Wt(kg): --    Appearance: Normal	  Cardiovascular: Normal S1 S2, No JVD, No murmurs, No edema  Respiratory: Lungs clear to auscultation	  Psychiatry: A & O x 3, Mood & affect appropriate  Gastrointestinal:  Soft, Non-tender, + BS	  Skin: No rashes, No ecchymoses, No cyanosis	  Neurologic: Non-focal, A&Ox3, nonfocal, WHITNEY x 4  Extremities: Normal range of motion, No clubbing, cyanosis or edema  Vascular: Peripheral pulses palpable 2+ bilaterally    I&O's Summary    	 	  LABS:	 	                          13.6   8.91  )-----------( 198      ( 2020 23:37 )             40.0         134<L>  |  100  |  16  ----------------------------<  142<H>  3.5   |  20<L>  |  0.76    Ca    8.7      2020 23:37  TPro  6.5  /  Alb  4.2  /  TBili  0.8  /  DBili  x   /  AST  35<H>  /  ALT  39<H>  /  AlkPhos  45  06-09  LIVER FUNCTIONS - ( 2020 23:37 )  Alb: 4.2 g/dL / Pro: 6.5 g/dL / ALK PHOS: 45 u/L / ALT: 39 u/L / AST: 35 u/L / GGT: x         proBNP:   Lipid Profile:   HgA1c:   TSH:   PT/INR - ( 2020 23:37 )   PT: 12.5 SEC;   INR: 1.08      PTT - ( 2020 23:37 )  PTT:26.5 SEC  CARDIAC MARKERS:  Blood Gas Venous - Lactate: 2.2 mmol/L (06-10 @ 01:35)  Blood Gas Venous - Lactate: 3.1 mmol/L ( @ 23:37)  CAPILLARY BLOOD GLUCOSE  Urinalysis Basic - ( 10 Hair 2020 00:05 )  Color: LIGHT YELLOW / Appearance: CLEAR / S.010 / pH: 6.0  Gluc: NEGATIVE / Ketone: NEGATIVE  / Bili: NEGATIVE / Urobili: NORMAL   Blood: NEGATIVE / Protein: 30 / Nitrite: NEGATIVE   Leuk Esterase: MODERATE / RBC: 0-2 / WBC 11-25   Sq Epi: FEW / Non Sq Epi: x / Bacteria: FEW

## 2020-06-10 NOTE — H&P ADULT - PROBLEM SELECTOR PLAN 1
+fever, HR, and lactate likely 2/2 COVID-19  -f/u COVID swab  -f/u BCx x2  -f/u UCx  -acetaminophen PRN fever  -abx as below +fever, HR, and lactate likely 2/2 COVID-19  -f/u COVID swab  -f/u BCx x2  -f/u UCx  -acetaminophen PRN fever -Pt noted to be hypoxic with increased work of breathing s/p ambulation. Also getting IVF bolus and maintenance IVF with suspicion for acute pulm edema superimposed on likely covid infection  -Stop IVF, treat with lasix x1  -Check CXR  -albuterol prn  -started on NRB, can de-escalate oxygen as tolerated  -Pt is full code, low threshold for ICU eval

## 2020-06-10 NOTE — H&P ADULT - NSICDXPASTSURGICALHX_GEN_ALL_CORE_FT
PAST SURGICAL HISTORY:  h/o cholecystectomy  2010     Left hand surgery 2004 h/o arthritis hand    Right hand surgery 1999 , h/o arthritis     S/P Appendectomy     S/P Colon Resection     S/P Hysterectomy

## 2020-06-10 NOTE — H&P ADULT - NSICDXPASTMEDICALHX_GEN_ALL_CORE_FT
PAST MEDICAL HISTORY:  Adult Hypothyroidism     Arthritis     Diverticulitis     DM (diabetes mellitus)     h/o Cholecystitis     h/o Phlebitis 30 yrs ago     h/o Prolapsed Uterus     h/o Pulmonary Embolus 30 yrs ago     Hiatal Hernia     Hyperlipidemia     Meniscus tear lateral left knee     Sleep Apnea     Spinal stenosis of lumbar region

## 2020-06-10 NOTE — ED ADULT NURSE REASSESSMENT NOTE - NS ED NURSE REASSESS COMMENT FT1
Break Coverage RN - Report received from primary RN Ute Escoto. Pt. awake & alert, received on cardiac monitor, on 2L O2 via nasal cannula. VS as noted. MD Sarah aware of pt. blood pressure. Fluids infusing as per order. Pt. offers no complaints at present, resting comfortably. Will continue to monitor.

## 2020-06-10 NOTE — H&P ADULT - PROBLEM SELECTOR PLAN 2
-f/u COVID swab  -check covid labs crp, ferritin, d dimer, lactate +fever, HR, and lactate likely 2/2 COVID-19  -Denies dysuria, likely asymptomatic bacteriuria   -f/u COVID swab  -f/u BCx x2  -f/u UCx  -acetaminophen PRN fever

## 2020-06-10 NOTE — CHART NOTE - NSCHARTNOTEFT_GEN_A_CORE
Made aware that pt had a 4.14 second pause on tele, hr now 56, on metoprolol 50 BID    Pt is asymptomatic, vitals stable.     Discussed with cardiology fellow, who recommended continue to monitor and recommended EP consult if pt has another pause.  Discussed with Dr. Linda.    Will continue to monitor pt.

## 2020-06-10 NOTE — H&P ADULT - PROBLEM SELECTOR PLAN 10
DVT PPx: on heparin gtt  Diet: CC  Transitions of Care Status:  1.  Name of PCP:  2.  PCP Contacted on Admission: [ ] Y    [ ] N    3.  PCP contacted at Discharge: [ ] Y    [ ] N    [ ] N/A  4.  Post-Discharge Appointment Date and Location:  5.  Summary of Handoff given to PCP:

## 2020-06-10 NOTE — H&P ADULT - PROBLEM SELECTOR PLAN 4
-A1C  -FSG TID/AC  -MARS New onset afib rvr s/p 5mg IV metop x2 then 50mg po. Now in upper 50s sinus  [id number] 5  -heparin gtt started  -tele  -cards recs  -TTE  -check TSH  -trend trops, likely elevated 2/2 demand ischemia in setting of afib rvr

## 2020-06-11 DIAGNOSIS — R00.1 BRADYCARDIA, UNSPECIFIED: ICD-10-CM

## 2020-06-11 LAB
-  AMIKACIN: SIGNIFICANT CHANGE UP
-  AMPICILLIN/SULBACTAM: SIGNIFICANT CHANGE UP
-  AMPICILLIN: SIGNIFICANT CHANGE UP
-  AZTREONAM: SIGNIFICANT CHANGE UP
-  CEFAZOLIN: SIGNIFICANT CHANGE UP
-  CEFEPIME: SIGNIFICANT CHANGE UP
-  CEFOXITIN: SIGNIFICANT CHANGE UP
-  CEFTRIAXONE: SIGNIFICANT CHANGE UP
-  CIPROFLOXACIN: SIGNIFICANT CHANGE UP
-  GENTAMICIN: SIGNIFICANT CHANGE UP
-  IMIPENEM: SIGNIFICANT CHANGE UP
-  LEVOFLOXACIN: SIGNIFICANT CHANGE UP
-  MEROPENEM: SIGNIFICANT CHANGE UP
-  NITROFURANTOIN: SIGNIFICANT CHANGE UP
-  PIPERACILLIN/TAZOBACTAM: SIGNIFICANT CHANGE UP
-  TIGECYCLINE: SIGNIFICANT CHANGE UP
-  TOBRAMYCIN: SIGNIFICANT CHANGE UP
-  TRIMETHOPRIM/SULFAMETHOXAZOLE: SIGNIFICANT CHANGE UP
ALBUMIN SERPL ELPH-MCNC: 3.7 G/DL — SIGNIFICANT CHANGE UP (ref 3.3–5)
ALP SERPL-CCNC: 54 U/L — SIGNIFICANT CHANGE UP (ref 40–120)
ALT FLD-CCNC: 83 U/L — HIGH (ref 4–33)
ANION GAP SERPL CALC-SCNC: 12 MMO/L — SIGNIFICANT CHANGE UP (ref 7–14)
ANION GAP SERPL CALC-SCNC: 13 MMO/L — SIGNIFICANT CHANGE UP (ref 7–14)
AST SERPL-CCNC: 38 U/L — HIGH (ref 4–32)
BASOPHILS # BLD AUTO: 0.05 K/UL — SIGNIFICANT CHANGE UP (ref 0–0.2)
BASOPHILS NFR BLD AUTO: 0.7 % — SIGNIFICANT CHANGE UP (ref 0–2)
BILIRUB SERPL-MCNC: 0.7 MG/DL — SIGNIFICANT CHANGE UP (ref 0.2–1.2)
BUN SERPL-MCNC: 14 MG/DL — SIGNIFICANT CHANGE UP (ref 7–23)
BUN SERPL-MCNC: 14 MG/DL — SIGNIFICANT CHANGE UP (ref 7–23)
CALCIUM SERPL-MCNC: 9 MG/DL — SIGNIFICANT CHANGE UP (ref 8.4–10.5)
CALCIUM SERPL-MCNC: 9.1 MG/DL — SIGNIFICANT CHANGE UP (ref 8.4–10.5)
CHLORIDE SERPL-SCNC: 101 MMOL/L — SIGNIFICANT CHANGE UP (ref 98–107)
CHLORIDE SERPL-SCNC: 104 MMOL/L — SIGNIFICANT CHANGE UP (ref 98–107)
CO2 SERPL-SCNC: 21 MMOL/L — LOW (ref 22–31)
CO2 SERPL-SCNC: 24 MMOL/L — SIGNIFICANT CHANGE UP (ref 22–31)
CREAT SERPL-MCNC: 0.72 MG/DL — SIGNIFICANT CHANGE UP (ref 0.5–1.3)
CREAT SERPL-MCNC: 0.73 MG/DL — SIGNIFICANT CHANGE UP (ref 0.5–1.3)
CULTURE RESULTS: SIGNIFICANT CHANGE UP
EOSINOPHIL # BLD AUTO: 0.08 K/UL — SIGNIFICANT CHANGE UP (ref 0–0.5)
EOSINOPHIL NFR BLD AUTO: 1.2 % — SIGNIFICANT CHANGE UP (ref 0–6)
FERRITIN SERPL-MCNC: 1073 NG/ML — HIGH (ref 15–150)
GLUCOSE SERPL-MCNC: 119 MG/DL — HIGH (ref 70–99)
GLUCOSE SERPL-MCNC: 187 MG/DL — HIGH (ref 70–99)
GRAM STN FLD: SIGNIFICANT CHANGE UP
HCT VFR BLD CALC: 36.3 % — SIGNIFICANT CHANGE UP (ref 34.5–45)
HCT VFR BLD CALC: 36.3 % — SIGNIFICANT CHANGE UP (ref 34.5–45)
HGB BLD-MCNC: 12.4 G/DL — SIGNIFICANT CHANGE UP (ref 11.5–15.5)
HGB BLD-MCNC: 12.4 G/DL — SIGNIFICANT CHANGE UP (ref 11.5–15.5)
IMM GRANULOCYTES NFR BLD AUTO: 0.7 % — SIGNIFICANT CHANGE UP (ref 0–1.5)
LYMPHOCYTES # BLD AUTO: 1.3 K/UL — SIGNIFICANT CHANGE UP (ref 1–3.3)
LYMPHOCYTES # BLD AUTO: 19.4 % — SIGNIFICANT CHANGE UP (ref 13–44)
MAGNESIUM SERPL-MCNC: 2 MG/DL — SIGNIFICANT CHANGE UP (ref 1.6–2.6)
MCHC RBC-ENTMCNC: 29.4 PG — SIGNIFICANT CHANGE UP (ref 27–34)
MCHC RBC-ENTMCNC: 29.4 PG — SIGNIFICANT CHANGE UP (ref 27–34)
MCHC RBC-ENTMCNC: 34.2 % — SIGNIFICANT CHANGE UP (ref 32–36)
MCHC RBC-ENTMCNC: 34.2 % — SIGNIFICANT CHANGE UP (ref 32–36)
MCV RBC AUTO: 86 FL — SIGNIFICANT CHANGE UP (ref 80–100)
MCV RBC AUTO: 86 FL — SIGNIFICANT CHANGE UP (ref 80–100)
METHOD TYPE: SIGNIFICANT CHANGE UP
MONOCYTES # BLD AUTO: 0.49 K/UL — SIGNIFICANT CHANGE UP (ref 0–0.9)
MONOCYTES NFR BLD AUTO: 7.3 % — SIGNIFICANT CHANGE UP (ref 2–14)
NEUTROPHILS # BLD AUTO: 4.74 K/UL — SIGNIFICANT CHANGE UP (ref 1.8–7.4)
NEUTROPHILS NFR BLD AUTO: 70.7 % — SIGNIFICANT CHANGE UP (ref 43–77)
NRBC # FLD: 0 K/UL — SIGNIFICANT CHANGE UP (ref 0–0)
NRBC # FLD: 0 K/UL — SIGNIFICANT CHANGE UP (ref 0–0)
ORGANISM # SPEC MICROSCOPIC CNT: SIGNIFICANT CHANGE UP
ORGANISM # SPEC MICROSCOPIC CNT: SIGNIFICANT CHANGE UP
PLATELET # BLD AUTO: 154 K/UL — SIGNIFICANT CHANGE UP (ref 150–400)
PLATELET # BLD AUTO: 154 K/UL — SIGNIFICANT CHANGE UP (ref 150–400)
PMV BLD: 10.4 FL — SIGNIFICANT CHANGE UP (ref 7–13)
PMV BLD: 10.4 FL — SIGNIFICANT CHANGE UP (ref 7–13)
POTASSIUM SERPL-MCNC: 3.3 MMOL/L — LOW (ref 3.5–5.3)
POTASSIUM SERPL-MCNC: 3.7 MMOL/L — SIGNIFICANT CHANGE UP (ref 3.5–5.3)
POTASSIUM SERPL-SCNC: 3.3 MMOL/L — LOW (ref 3.5–5.3)
POTASSIUM SERPL-SCNC: 3.7 MMOL/L — SIGNIFICANT CHANGE UP (ref 3.5–5.3)
PROT SERPL-MCNC: 6.4 G/DL — SIGNIFICANT CHANGE UP (ref 6–8.3)
RBC # BLD: 4.22 M/UL — SIGNIFICANT CHANGE UP (ref 3.8–5.2)
RBC # BLD: 4.22 M/UL — SIGNIFICANT CHANGE UP (ref 3.8–5.2)
RBC # FLD: 13.3 % — SIGNIFICANT CHANGE UP (ref 10.3–14.5)
RBC # FLD: 13.3 % — SIGNIFICANT CHANGE UP (ref 10.3–14.5)
SODIUM SERPL-SCNC: 137 MMOL/L — SIGNIFICANT CHANGE UP (ref 135–145)
SODIUM SERPL-SCNC: 138 MMOL/L — SIGNIFICANT CHANGE UP (ref 135–145)
SPECIMEN SOURCE: SIGNIFICANT CHANGE UP
WBC # BLD: 6.71 K/UL — SIGNIFICANT CHANGE UP (ref 3.8–10.5)
WBC # BLD: 6.71 K/UL — SIGNIFICANT CHANGE UP (ref 3.8–10.5)
WBC # FLD AUTO: 6.71 K/UL — SIGNIFICANT CHANGE UP (ref 3.8–10.5)
WBC # FLD AUTO: 6.71 K/UL — SIGNIFICANT CHANGE UP (ref 3.8–10.5)

## 2020-06-11 PROCEDURE — 99233 SBSQ HOSP IP/OBS HIGH 50: CPT

## 2020-06-11 PROCEDURE — ZZZZZ: CPT

## 2020-06-11 RX ORDER — ACETAMINOPHEN 500 MG
650 TABLET ORAL EVERY 6 HOURS
Refills: 0 | Status: DISCONTINUED | OUTPATIENT
Start: 2020-06-11 | End: 2020-06-17

## 2020-06-11 RX ORDER — APIXABAN 2.5 MG/1
5 TABLET, FILM COATED ORAL EVERY 12 HOURS
Refills: 0 | Status: DISCONTINUED | OUTPATIENT
Start: 2020-06-11 | End: 2020-06-15

## 2020-06-11 RX ORDER — METOPROLOL TARTRATE 50 MG
12.5 TABLET ORAL
Refills: 0 | Status: DISCONTINUED | OUTPATIENT
Start: 2020-06-11 | End: 2020-06-11

## 2020-06-11 RX ORDER — POTASSIUM CHLORIDE 20 MEQ
40 PACKET (EA) ORAL ONCE
Refills: 0 | Status: COMPLETED | OUTPATIENT
Start: 2020-06-11 | End: 2020-06-11

## 2020-06-11 RX ADMIN — PIPERACILLIN AND TAZOBACTAM 25 GRAM(S): 4; .5 INJECTION, POWDER, LYOPHILIZED, FOR SOLUTION INTRAVENOUS at 11:26

## 2020-06-11 RX ADMIN — PIPERACILLIN AND TAZOBACTAM 25 GRAM(S): 4; .5 INJECTION, POWDER, LYOPHILIZED, FOR SOLUTION INTRAVENOUS at 04:09

## 2020-06-11 RX ADMIN — PIPERACILLIN AND TAZOBACTAM 25 GRAM(S): 4; .5 INJECTION, POWDER, LYOPHILIZED, FOR SOLUTION INTRAVENOUS at 20:51

## 2020-06-11 RX ADMIN — Medication 40 MILLIEQUIVALENT(S): at 18:01

## 2020-06-11 RX ADMIN — Medication 1: at 12:42

## 2020-06-11 RX ADMIN — ENOXAPARIN SODIUM 90 MILLIGRAM(S): 100 INJECTION SUBCUTANEOUS at 11:12

## 2020-06-11 RX ADMIN — APIXABAN 5 MILLIGRAM(S): 2.5 TABLET, FILM COATED ORAL at 17:44

## 2020-06-11 NOTE — PROGRESS NOTE ADULT - ASSESSMENT
78 yo F with a PMHx of T2DM, OA, hypothyroid, obesity, remote h/o PE, diverticulitis s/p multiple abdominal surgeries, hiatal hernia, PREMA, lumbar spinal stenosis, HLD who p/w fever (Tm 102.3), body ache, chills and poor appetite due to E coli bacteremia. Course c/b new afib with RVR.

## 2020-06-11 NOTE — CONSULT NOTE ADULT - PROBLEM SELECTOR RECOMMENDATION 9
-Likely in the setting of sleep apnea (h/o PREMA with non-adhering to CPAP use) and AVN blocker use.   -Continue to hold AVN blocker.  -H/O PREMA, and not adhering to CPAP use. Oxygen pleth correlates with tele  -Echo in AM to R/O thrombus and evaluate LV function and wall motion abnormality.  -Continue tele monitoring   -EKG, PRN chest pain

## 2020-06-11 NOTE — PROGRESS NOTE ADULT - PROBLEM SELECTOR PLAN 4
New onset afib rvr in setting of sepsis. Now in sinus rhythm. Becomes bradycardic even with low dose metoprolol. CHADSVASC elevated. Counseled patient on risk of stroke, risks/benefits of anticoagulation. She elects anticoagulation as she is fully independent and a stroke would be devastating.   -c/w therapeutic LMWH, will contact pharmacy to see which DOAC would be covered under her insurance  -cards recs appreciated  -TSH wnl  -trops elevated 2/2 demand ischemia in setting of afib rvr, no c/o cp; inconsistent with ACS

## 2020-06-11 NOTE — PROGRESS NOTE ADULT - SUBJECTIVE AND OBJECTIVE BOX
Patient is a 79y old  Female who presents with a chief complaint of Concern for COVID-19 (2020 13:17)      SUBJECTIVE / OVERNIGHT EVENTS:    No events overnight. This AM, patient without n/v/d/cp/sob. Still bradycardic at times.    MEDICATIONS  (STANDING):  dextrose 5%. 1000 milliLiter(s) (50 mL/Hr) IV Continuous <Continuous>  dextrose 50% Injectable 12.5 Gram(s) IV Push once  dextrose 50% Injectable 25 Gram(s) IV Push once  dextrose 50% Injectable 25 Gram(s) IV Push once  enoxaparin Injectable 90 milliGRAM(s) SubCutaneous <User Schedule>  insulin lispro (HumaLOG) corrective regimen sliding scale   SubCutaneous three times a day before meals  insulin lispro (HumaLOG) corrective regimen sliding scale   SubCutaneous at bedtime  piperacillin/tazobactam IVPB.. 3.375 Gram(s) IV Intermittent every 8 hours    MEDICATIONS  (PRN):  acetaminophen   Tablet .. 650 milliGRAM(s) Oral every 6 hours PRN Temp greater or equal to 38C (100.4F)  ALBUTerol    90 MICROgram(s) HFA Inhaler 2 Puff(s) Inhalation every 6 hours PRN Shortness of Breath and/or Wheezing  dextrose 40% Gel 15 Gram(s) Oral once PRN Blood Glucose LESS THAN 70 milliGRAM(s)/deciliter  glucagon  Injectable 1 milliGRAM(s) IntraMuscular once PRN Glucose LESS THAN 70 milligrams/deciliter      PHYSICAL EXAM:  T(C): 37.2 (20 @ 13:58), Max: 39.3 (06-10-20 @ 22:01)  HR: 59 (20 @ 13:58) (54 - 70)  BP: 139/62 (20 @ 13:58) (112/62 - 160/66)  RR: 19 (20 @ 13:58) (18 - 20)  SpO2: 97% (20 @ 13:58) (97% - 100%)  I&O's Summary    10 Hair 2020 07:01  -  2020 07:00  --------------------------------------------------------  IN: 330 mL / OUT: 565 mL / NET: -235 mL      GENERAL: NAD, well-developed, lying in bed  HEAD:  Atraumatic, Normocephalic, MMM, +presbycusis   CHEST/LUNG: No use of accessory muscles, speaking in complete sentences, CTAB  COR: RR, no mrcg  PSYCH: AAOx3  NEUROLOGY: CN II-XII grossly intact, moving all extremities  SKIN: No rashes or lesions    LABS:  CAPILLARY BLOOD GLUCOSE      POCT Blood Glucose.: 191 mg/dL (2020 12:05)  POCT Blood Glucose.: 143 mg/dL (2020 08:37)  POCT Blood Glucose.: 148 mg/dL (10 Hair 2020 21:53)  POCT Blood Glucose.: 112 mg/dL (10 Hair 2020 18:34)                          12.4   6.71  )-----------( 154      ( 2020 06:36 )             36.3     06-11    137  |  101  |  14  ----------------------------<  119<H>  3.3<L>   |  24  |  0.72    Ca    9.0      2020 06:36  Mg     2.0     06-11    TPro  6.5  /  Alb  4.2  /  TBili  0.8  /  DBili  x   /  AST  35<H>  /  ALT  39<H>  /  AlkPhos  45  06-09    PT/INR - ( 2020 23:37 )   PT: 12.5 SEC;   INR: 1.08          PTT - ( 10 Hair 2020 06:10 )  PTT:150.5 SEC  CARDIAC MARKERS ( 10 Hair 2020 05:30 )  x     / x     / 86 u/L / x     / x          Urinalysis Basic - ( 10 Hair 2020 00:05 )    Color: LIGHT YELLOW / Appearance: CLEAR / S.010 / pH: 6.0  Gluc: NEGATIVE / Ketone: NEGATIVE  / Bili: NEGATIVE / Urobili: NORMAL   Blood: NEGATIVE / Protein: 30 / Nitrite: NEGATIVE   Leuk Esterase: MODERATE / RBC: 0-2 / WBC 11-25   Sq Epi: FEW / Non Sq Epi: x / Bacteria: FEW        Culture - Urine (collected 10 Hair 2020 02:04)  Source: .Urine Clean Catch (Midstream)  Preliminary Report (2020 02:11):    >100,000 CFU/ml Gram Negative Rods    Culture - Blood (collected 10 Hair 2020 02:01)  Source: .Blood Blood-Venous  Gram Stain (2020 01:13):    Growth in anaerobic bottle: Gram Negative Rods    Growth in aerobic bottle: Gram Negative Rods  Preliminary Report (2020 01:13):    Growth in anaerobic bottle: Gram Negative Rods    Growth in aerobic bottle: Gram Negative Rods    Culture - Blood (collected 10 Hair 2020 02:01)  Source: .Blood Blood-Peripheral  Gram Stain (10 Hair 2020 16:34):    Growth in aerobic bottle: Gram Negative Rods  Preliminary Report (10 Hair 2020 16:34):    Growth in aerobic bottle: Gram Negative Rods    ***Blood Panel PCR results on this specimen are available    approximately 3 hours after the Gram stain result.***    Gram stain, PCR, and/or culture results may not always    correspond due to difference in methodologies.    ************************************************************    This PCR assay was performed using [employer].    The following targets are tested for: Enterococcus,    vancomycin resistant enterococci, Listeria monocytogenes,    coagulase negative staphylococci, S. aureus,    methicillin resistant S. aureus, Streptococcus agalactiae    (Group B), S. pneumoniae, S. pyogenes (Group A),    Acinetobacter baumannii, Enterobacter cloacae, E. coli,    Klebsiella oxytoca, K. pneumoniae, Proteus sp.,    Serratia marcescens, Haemophilus influenzae,    Neisseria meningitidis, Pseudomonas aeruginosa, Candida    albicans, C. glabrata, C krusei, C parapsilosis,    C. tropicalis and the KPC resistance gene.    "Due to technical problems, Proteus sp. will Not be reported as part of    the BCID panel until further notice"  Organism: Blood Culture PCR (10 Hair 2020 19:18)  Organism: Blood Culture PCR (10 Hair 2020 19:18)        RADIOLOGY & ADDITIONAL TESTS:    Telemetry Personally Reviewed -     Imaging Personally Reviewed -     Imaging Reviewed -     Consultant(s) Notes Reviewed -       Care Discussed with Consultants/Other Providers -

## 2020-06-11 NOTE — PROGRESS NOTE ADULT - SUBJECTIVE AND OBJECTIVE BOX
Patient seen and examined at bedside.    Overnight Events:     ROS:    Current Meds:  ALBUTerol    90 MICROgram(s) HFA Inhaler 2 Puff(s) Inhalation every 6 hours PRN  dextrose 40% Gel 15 Gram(s) Oral once PRN  dextrose 5%. 1000 milliLiter(s) IV Continuous <Continuous>  dextrose 50% Injectable 12.5 Gram(s) IV Push once  dextrose 50% Injectable 25 Gram(s) IV Push once  dextrose 50% Injectable 25 Gram(s) IV Push once  enoxaparin Injectable 90 milliGRAM(s) SubCutaneous <User Schedule>  glucagon  Injectable 1 milliGRAM(s) IntraMuscular once PRN  insulin lispro (HumaLOG) corrective regimen sliding scale   SubCutaneous three times a day before meals  insulin lispro (HumaLOG) corrective regimen sliding scale   SubCutaneous at bedtime  piperacillin/tazobactam IVPB.. 3.375 Gram(s) IV Intermittent every 8 hours      Vitals:  T(F): 98.3 (06-11), Max: 102.7 (06-10)  HR: 62 (06-11) (54 - 70)  BP: 136/62 (06-11) (112/62 - 160/66)  RR: 19 (06-11)  SpO2: 98% (06-11)  I&O's Summary    10 Hair 2020 07:01  -  11 Jun 2020 07:00  --------------------------------------------------------  IN: 330 mL / OUT: 565 mL / NET: -235 mL        Physical Exam:  Appearance: No acute distress; well appearing  Eyes:  EOMI, pink conjunctiva  HENT: Normal oral mucosa  Cardiovascular: RRR, S1, S2, no murmurs, rubs, or gallops; no edema; no JVD  Respiratory: Clear to auscultation bilaterally  Gastrointestinal: soft, non-tender, non-distended with normal bowel sounds  Musculoskeletal: No clubbing; no joint deformity   Neurologic: Non-focal  Lymphatic: No lymphadenopathy  Psychiatry: AAOx3, mood & affect appropriate  Skin: No rashes                          12.4   6.71  )-----------( 154      ( 11 Jun 2020 06:36 )             36.3     06-11    137  |  101  |  14  ----------------------------<  119<H>  3.3<L>   |  24  |  0.72    Ca    9.0      11 Jun 2020 06:36  Mg     2.0     06-11    TPro  6.5  /  Alb  4.2  /  TBili  0.8  /  DBili  x   /  AST  35<H>  /  ALT  39<H>  /  AlkPhos  45  06-09    PT/INR - ( 09 Jun 2020 23:37 )   PT: 12.5 SEC;   INR: 1.08          PTT - ( 10 Hair 2020 06:10 )  PTT:150.5 SEC  CARDIAC MARKERS ( 10 Hair 2020 05:30 )  x     / x     / x     / 86 u/L / x     / x                  New ECG(s): Personally reviewed    Echo:    Stress Testing:     Cath:    Imaging:    Interpretation of Telemetry:    Assessment and Plan:    French Ruiz MD  Cardiology Fellow   [phone]  For all other Cardiology service contact information, go to [email or web address] and use "[email or web address]" to login. Patient seen and examined at bedside.    Overnight Events:  had 4 sec pause overnight, has asymptomatic bradycardia    ROS: no chest pain/palpitations/lighteadedness    Current Meds:  ALBUTerol    90 MICROgram(s) HFA Inhaler 2 Puff(s) Inhalation every 6 hours PRN  dextrose 40% Gel 15 Gram(s) Oral once PRN  dextrose 5%. 1000 milliLiter(s) IV Continuous <Continuous>  dextrose 50% Injectable 12.5 Gram(s) IV Push once  dextrose 50% Injectable 25 Gram(s) IV Push once  dextrose 50% Injectable 25 Gram(s) IV Push once  enoxaparin Injectable 90 milliGRAM(s) SubCutaneous <User Schedule>  glucagon  Injectable 1 milliGRAM(s) IntraMuscular once PRN  insulin lispro (HumaLOG) corrective regimen sliding scale   SubCutaneous three times a day before meals  insulin lispro (HumaLOG) corrective regimen sliding scale   SubCutaneous at bedtime  piperacillin/tazobactam IVPB.. 3.375 Gram(s) IV Intermittent every 8 hours      Vitals:  T(F): 98.3 (06-11), Max: 102.7 (06-10)  HR: 62 (06-11) (54 - 70)  BP: 136/62 (06-11) (112/62 - 160/66)  RR: 19 (06-11)  SpO2: 98% (06-11)  I&O's Summary    10 Hair 2020 07:01  -  11 Jun 2020 07:00  --------------------------------------------------------  IN: 330 mL / OUT: 565 mL / NET: -235 mL    Physical Exam:  Appearance: Normal	  Cardiovascular: Normal S1 S2, No JVD, No murmurs, No edema  Respiratory: Lungs clear to auscultation	  Psychiatry: A & O x 3, Mood & affect appropriate  Gastrointestinal:  Soft, Non-tender, + BS	  Skin: No rashes, No ecchymoses, No cyanosis	  Neurologic: Non-focal, A&Ox3, nonfocal, WHITNEY x 4  Extremities: Normal range of motion, No clubbing, cyanosis or edema  Vascular: Peripheral pulses palpable 2+ bilaterally                        12.4   6.71  )-----------( 154      ( 11 Jun 2020 06:36 )             36.3     06-11    137  |  101  |  14  ----------------------------<  119<H>  3.3<L>   |  24  |  0.72    Ca    9.0      11 Jun 2020 06:36  Mg     2.0     06-11    TPro  6.5  /  Alb  4.2  /  TBili  0.8  /  DBili  x   /  AST  35<H>  /  ALT  39<H>  /  AlkPhos  45  06-09    PT/INR - ( 09 Jun 2020 23:37 )   PT: 12.5 SEC;   INR: 1.08       PTT - ( 10 Hair 2020 06:10 )  PTT:150.5 SEC  CARDIAC MARKERS ( 10 Hair 2020 05:30 )  x     / x     / x     / 86 u/L / x     / x        Echo: pending    Interpretation of Telemetry: sinus 40-60s, 4s pause    Assessment and Plan:    78 y/o female PMH DM, HLD, OA, cdiff?, Hypothyroid, Obesity, REMOTE PE and Phlebitis years ago, HX of Diverticulitis with multiple abdominal surgeries, Hiatal Hernia, PREMA not on CPAP, Spinal Stenosis L/S, brought from home by EMS c/o fever tmax 102.3, associated w/ fever, chills, and decreased PO intake,. Also endorses that on Friday she had multiple episodes of diarrhea. Now noted to be in and out of Afib rvr and sinus david HR 55-67 in the setting of infectious process.      Paroxysmal Atrial fibrillation with RVR, currently rate controlled    - had 4 sec pause, during NSR, some asymptomatic bradycardia, metop was held  - if continues to have >3 sec pauses, consult EP  - CHASVASC score elevated, anticoagulation indicated, would recomment Eliquis 5 mg BID  - echo pending    French Ruiz MD  Cardiology Fellow   [phone]  For all other Cardiology service contact information, go to [email or web address] and use "[email or web address]" to login. Patient seen and examined at bedside.    Overnight Events:  had 4 sec pause overnight, has asymptomatic bradycardia    ROS: no chest pain/palpitations/lighteadedness    Current Meds:  ALBUTerol    90 MICROgram(s) HFA Inhaler 2 Puff(s) Inhalation every 6 hours PRN  dextrose 40% Gel 15 Gram(s) Oral once PRN  dextrose 5%. 1000 milliLiter(s) IV Continuous <Continuous>  dextrose 50% Injectable 12.5 Gram(s) IV Push once  dextrose 50% Injectable 25 Gram(s) IV Push once  dextrose 50% Injectable 25 Gram(s) IV Push once  enoxaparin Injectable 90 milliGRAM(s) SubCutaneous <User Schedule>  glucagon  Injectable 1 milliGRAM(s) IntraMuscular once PRN  insulin lispro (HumaLOG) corrective regimen sliding scale   SubCutaneous three times a day before meals  insulin lispro (HumaLOG) corrective regimen sliding scale   SubCutaneous at bedtime  piperacillin/tazobactam IVPB.. 3.375 Gram(s) IV Intermittent every 8 hours      Vitals:  T(F): 98.3 (06-11), Max: 102.7 (06-10)  HR: 62 (06-11) (54 - 70)  BP: 136/62 (06-11) (112/62 - 160/66)  RR: 19 (06-11)  SpO2: 98% (06-11)  I&O's Summary    10 Hair 2020 07:01  -  11 Jun 2020 07:00  --------------------------------------------------------  IN: 330 mL / OUT: 565 mL / NET: -235 mL    Physical Exam:  Appearance: Normal	  Cardiovascular: Normal S1 S2, No JVD, No murmurs, No edema  Respiratory: Lungs clear to auscultation	  Psychiatry: A & O x 3, Mood & affect appropriate  Gastrointestinal:  Soft, Non-tender, + BS	  Skin: No rashes, No ecchymoses, No cyanosis	  Neurologic: Non-focal, A&Ox3, nonfocal, WHITNEY x 4  Extremities: Normal range of motion, No clubbing, cyanosis or edema  Vascular: Peripheral pulses palpable 2+ bilaterally                        12.4   6.71  )-----------( 154      ( 11 Jun 2020 06:36 )             36.3     06-11    137  |  101  |  14  ----------------------------<  119<H>  3.3<L>   |  24  |  0.72    Ca    9.0      11 Jun 2020 06:36  Mg     2.0     06-11    TPro  6.5  /  Alb  4.2  /  TBili  0.8  /  DBili  x   /  AST  35<H>  /  ALT  39<H>  /  AlkPhos  45  06-09    PT/INR - ( 09 Jun 2020 23:37 )   PT: 12.5 SEC;   INR: 1.08       PTT - ( 10 Hair 2020 06:10 )  PTT:150.5 SEC  CARDIAC MARKERS ( 10 Hair 2020 05:30 )  x     / x     / x     / 86 u/L / x     / x        Echo: pending    Interpretation of Telemetry: sinus 40-60s, 4s pause    Assessment and Plan:    80 y/o female PMH DM, HLD, OA, cdiff?, Hypothyroid, Obesity, REMOTE PE and Phlebitis years ago, HX of Diverticulitis with multiple abdominal surgeries, Hiatal Hernia, PREMA not on CPAP, Spinal Stenosis L/S, brought from home by EMS c/o fever tmax 102.3, associated w/ fever, chills, and decreased PO intake,. Also endorses that on Friday she had multiple episodes of diarrhea. Now noted to be in and out of Afib rvr and sinus david HR 55-67 in the setting of infectious process.      Paroxysmal Atrial fibrillation with RVR, currently rate controlled    - had 4 sec pause, during NSR, some asymptomatic bradycardia, metop was held  - if continues to have >3 sec pauses, consult EP  - CHASVASC score elevated, anticoagulation indicated, would recommend Eliquis 5 mg BID  - echo pending    French Ruiz MD  Cardiology Fellow   [phone]  For all other Cardiology service contact information, go to [email or web address] and use "[email or web address]" to login.

## 2020-06-11 NOTE — CHART NOTE - NSCHARTNOTEFT_GEN_A_CORE
Notified by RN that patient david down to 38 on tele monitor. This patient came in with Afib RVR and received doses of lopressor in ED and on floor. Dose has been lowered on day shift as there was a 4.1 second pause on tele 6/10/2020 ,discussed with cardio fellow, advised to monitor and consult EP if it happens again.    Patient asymptomatic and sleeping during episodes and has HRs ranging 40s-50s.    Patients vitals stable.   T(F): 100 (11 Jun 2020 01:57), Max: 102.7 (10 Hair 2020 22:01)--> received tylenol  HR: 59 (11 Jun 2020 01:57) (54 - 78)  BP: 137/55 (11 Jun 2020 01:57) (102/42 - 160/66)  RR: 18 (11 Jun 2020 01:57) (18 - 26)  SpO2: 100% (11 Jun 2020 01:57) (98% - 100%)    Placed pacer pads on patient, zoll and atropine at bedside.   Will make EP aware although clear etiology of david 2/2 to lopressor dose.   Will hold AM dose of lopressor if patient remains bradycardic.

## 2020-06-11 NOTE — PROGRESS NOTE ADULT - PROBLEM SELECTOR PLAN 2
+fever, HR, and lactate due to E coli bacteremia   -Denies dysuria, likely asymptomatic bacteriuria   -suspect urine source given Ucx with >100K CFU/mL GNR  -c/w pip-tazo for now, f/u sensitivities, repeat bcx in AM

## 2020-06-11 NOTE — PROGRESS NOTE ADULT - PROBLEM SELECTOR PLAN 10
DVT PPx: on therapeutic enoxaparin  Diet: CC  Transitions of Care Status:  1.  Name of PCP:  2.  PCP Contacted on Admission: [x ] Y    [ ] N    3.  PCP contacted at Discharge: [ ] Y    [ ] N    [ ] N/A  4.  Post-Discharge Appointment Date and Location:  5.  Summary of Handoff given to PCP:

## 2020-06-11 NOTE — PROGRESS NOTE ADULT - PROBLEM SELECTOR PLAN 1
-Pt noted to be hypoxic with increased work of breathing s/p ambulation on admission. CXR and CTA chest clear; no e/o PE. Not requiring oxygen. No further dyspnea.

## 2020-06-11 NOTE — CONSULT NOTE ADULT - ASSESSMENT
78 y/o female PMHX DM, HLD, C. diff?, Hypothyroid, Obesity, REMOTE PE and phlebitis, PREMA not on CPAP, presented with c/o fever (102.3), found to have E. Coli bacteremia and being treated with antibiotics. Noted to be in and out of Afib RVR and sinus david with HR 55-67 in the setting of infectious process. 78 y/o female PMHX DM, HLD, C. diff?, Hypothyroid, Obesity, REMOTE PE and phlebitis, PREMA not on CPAP, presented with c/o fever (102.3), found to have E. Coli bacteremia and being treated with antibiotics. Noted to have Afib RVR and asymptomatic sinus bradycardia with HR as low as 28 in the setting of sleep apnea, and metoprolol use. Metoprolol on hold now. Currently telemetry reveals sinus rhythm with HR 60s-70s when patient is awake. TSH normal. Discussed with Dr. Machado  - Continue to monitor on telemetry for symptomatic tachy/david arrhythmia  - Continue to hold AV beau blockers  - Recommend anticoagulation with apixaban 5mg BID if not contraindicated. [id number] score=7; Age, sex, HTN, DM, PE   - CPAP during sleep if possible as patient has known history of sleep apnea  - Maintain K+ ~4.0 and Mg++~2.0  - Echocardiogram to evaluate LV function and valve function  - Possible MIcra (leadless PM) implantation for tachybrady syndrome, when patient is cleared by ID  - Will follow up

## 2020-06-11 NOTE — CONSULT NOTE ADULT - SUBJECTIVE AND OBJECTIVE BOX
CHIEF COMPLAINT: Called to evaluate patient with bradycardia    HISTORY OF PRESENT ILLNESS:  80 y/o female PMH DM, HLD, PREMA not on CPAP, OA, cdiff?, Hypothyroid, Obesity, REMOTE PE and Phlebitis (>30 years), HX of Diverticulitis with multiple abdominal surgeries, Hiatal Hernia, Spinal Stenosis L/S, brought from home by EMS c/o fever tmax 102.3, associated w/ chills, and decreased PO intake,. Also endorses that on Friday she had multiple episodes of diarrhea. In ED noted to be in Atrial fibrillation with RVR. Pt given metoprolol IVP x 2. Pt noted to be going in and out of Afib RVR and sinus david HR 55-67. Patient was on metoprolol 25mg BID and was maintaining SR. However, she was noted to have sinus bradycardia, with heart rate as low as 28 bpm during sleep. Metoprolol on hold now. Now, telemetry reveals SR with HR=60's to 70's while awake. Patient had no associated symptoms during the bradycardia episodes. Patient reports history of sleep apnea, but not adhering to CPAP. She denies any prior history of syncope or near syncope. Denies CP, SOB, palpitation, nausea or vomiting.  Currently, patient is being treated for E.Coli bacteremia.       PAST MEDICAL & SURGICAL HISTORY:  DM (diabetes mellitus)  Spinal stenosis of lumbar region  Sleep Apnea  Meniscus tear lateral left knee  h/o Prolapsed Uterus  h/o Pulmonary Embolus 30 yrs ago  h/o Phlebitis 30 yrs ago  h/o Cholecystitis  Hiatal Hernia  Hyperlipidemia  Arthritis  Diverticulitis  Adult Hypothyroidism  Right hand surgery 1999 , h/o arthritis  Left hand surgery 2004: h/o arthritis hand  h/o cholecystectomy  2010  S/P Hysterectomy  S/P Appendectomy  S/P Colon Resection      PREVIOUS DIAGNOSTIC TESTING:    Echocardiogram: pending      MEDICATIONS:  apixaban 5 milliGRAM(s) Oral every 12 hours  piperacillin/tazobactam IVPB.. 3.375 Gram(s) IV Intermittent every 8 hours  ALBUTerol    90 MICROgram(s) HFA Inhaler 2 Puff(s) Inhalation every 6 hours PRN  acetaminophen   Tablet .. 650 milliGRAM(s) Oral every 6 hours PRN      dextrose 40% Gel 15 Gram(s) Oral once PRN  dextrose 50% Injectable 12.5 Gram(s) IV Push once  dextrose 50% Injectable 25 Gram(s) IV Push once  dextrose 50% Injectable 25 Gram(s) IV Push once  glucagon  Injectable 1 milliGRAM(s) IntraMuscular once PRN  insulin lispro (HumaLOG) corrective regimen sliding scale   SubCutaneous three times a day before meals  insulin lispro (HumaLOG) corrective regimen sliding scale   SubCutaneous at bedtime    dextrose 5%. 1000 milliLiter(s) IV Continuous <Continuous>      FAMILY HISTORY:  No pertinent family history in first degree relatives    SOCIAL HISTORY:  Lives with her .    [-] Smoker  [-] Alcohol  [-] Drugs    Allergies  No Known Allergies      REVIEW OF SYSTEMS:  CONSTITUTIONAL: +Fever, +fatigue  EYES: No eye pain, visual disturbances, or discharge  ENMT:  No difficulty hearing, tinnitus, vertigo; No sinus or throat pain  NECK: No pain or stiffness  RESPIRATORY: No cough, wheezing, chills or hemoptysis; No Shortness of Breath  CARDIOVASCULAR: No chest pain, palpitations, passing out, dizziness, or leg swelling  GASTROINTESTINAL: + Diarrhea  GENITOURINARY: No dysuria, frequency, hematuria, or incontinence  NEUROLOGICAL: No headaches, memory loss, loss of strength, numbness, or tremors  SKIN: No itching, burning, rashes, or lesions   LYMPH Nodes: No enlarged glands  ENDOCRINE: No heat or cold intolerance; No hair loss  MUSCULOSKELETAL: No joint pain or swelling; No muscle, back, or extremity pain  PSYCHIATRIC: No anxiety, mood swings, or difficulty sleeping, +Depression  HEME/LYMPH: No easy bruising, or bleeding gums  ALLERY AND IMMUNOLOGIC: No hives or eczema	    [x] All others negative	  [ ] Unable to obtain    PHYSICAL EXAM:  T(C): 37.2 (06-11-20 @ 13:58), Max: 39.3 (06-10-20 @ 22:01)  HR: 59 (06-11-20 @ 13:58) (54 - 70)  BP: 139/62 (06-11-20 @ 13:58) (112/62 - 160/66)  RR: 19 (06-11-20 @ 13:58) (18 - 20)  SpO2: 97% (06-11-20 @ 13:58) (97% - 100%)  Wt(kg): --  I&O's Summary    10 Hair 2020 07:01  -  11 Jun 2020 07:00  --------------------------------------------------------  IN: 330 mL / OUT: 565 mL / NET: -235 mL      Appearance: Normal	  HEENT:   Normal oral mucosa, PERRL, EOMI	  Cardiovascular: Normal S1 S2, No JVD, No murmurs, No edema  Respiratory: Lungs clear to auscultation	  Psychiatry: A & O x 3, Mood & affect appropriate  Gastrointestinal:  Soft, Non-tender, + BS	  Skin: No rashes, No ecchymoses, No cyanosis	  Neurologic: Non-focal  Extremities: Normal range of motion, No clubbing, cyanosis or edema      TELEMETRY: Sinus rhythm with HR= 70's-80's  ECG:  	    LABS:	 	             12.4   6.71  )-----------( 154      ( 11 Jun 2020 06:36 )             36.3     06-11    137  |  101  |  14  ----------------------------<  119<H>  3.3<L>   |  24  |  0.72    Ca    9.0      11 Jun 2020 06:36  Mg     2.0     06-11    TPro  6.5  /  Alb  4.2  /  TBili  0.8  /  DBili  x   /  AST  35<H>  /  ALT  39<H>  /  AlkPhos  45  06-09    HgA1c: 5.9  TSH: 1.35

## 2020-06-12 LAB
-  AMIKACIN: SIGNIFICANT CHANGE UP
-  AMOXICILLIN/CLAVULANIC ACID: SIGNIFICANT CHANGE UP
-  AMPICILLIN/SULBACTAM: SIGNIFICANT CHANGE UP
-  AMPICILLIN/SULBACTAM: SIGNIFICANT CHANGE UP
-  AMPICILLIN: SIGNIFICANT CHANGE UP
-  AMPICILLIN: SIGNIFICANT CHANGE UP
-  AZTREONAM: SIGNIFICANT CHANGE UP
-  CEFAZOLIN: SIGNIFICANT CHANGE UP
-  CEFAZOLIN: SIGNIFICANT CHANGE UP
-  CEFEPIME: SIGNIFICANT CHANGE UP
-  CEFOTAXIME: SIGNIFICANT CHANGE UP
-  CEFOXITIN: SIGNIFICANT CHANGE UP
-  CEFTAZIDIME: SIGNIFICANT CHANGE UP
-  CEFTRIAXONE: SIGNIFICANT CHANGE UP
-  CEFTRIAXONE: SIGNIFICANT CHANGE UP
-  CEFUROXIME: SIGNIFICANT CHANGE UP
-  CIPROFLOXACIN: SIGNIFICANT CHANGE UP
-  ERTAPENEM: SIGNIFICANT CHANGE UP
-  GENTAMICIN: SIGNIFICANT CHANGE UP
-  LEVOFLOXACIN: SIGNIFICANT CHANGE UP
-  MEROPENEM: SIGNIFICANT CHANGE UP
-  MINOCYCLINE: SIGNIFICANT CHANGE UP
-  PIPERACILLIN/TAZOBACTAM: SIGNIFICANT CHANGE UP
-  TIGECYCLINE: SIGNIFICANT CHANGE UP
-  TOBRAMYCIN: SIGNIFICANT CHANGE UP
-  TRIMETHOPRIM/SULFAMETHOXAZOLE: SIGNIFICANT CHANGE UP
ANION GAP SERPL CALC-SCNC: 15 MMO/L — HIGH (ref 7–14)
BUN SERPL-MCNC: 10 MG/DL — SIGNIFICANT CHANGE UP (ref 7–23)
CALCIUM SERPL-MCNC: 9.3 MG/DL — SIGNIFICANT CHANGE UP (ref 8.4–10.5)
CHLORIDE SERPL-SCNC: 102 MMOL/L — SIGNIFICANT CHANGE UP (ref 98–107)
CO2 SERPL-SCNC: 20 MMOL/L — LOW (ref 22–31)
CREAT SERPL-MCNC: 0.65 MG/DL — SIGNIFICANT CHANGE UP (ref 0.5–1.3)
CRP SERPL-MCNC: 108 MG/L — HIGH
CULTURE RESULTS: SIGNIFICANT CHANGE UP
CULTURE RESULTS: SIGNIFICANT CHANGE UP
D DIMER BLD IA.RAPID-MCNC: 284 NG/ML — SIGNIFICANT CHANGE UP
FERRITIN SERPL-MCNC: 740.1 NG/ML — HIGH (ref 15–150)
GLUCOSE SERPL-MCNC: 145 MG/DL — HIGH (ref 70–99)
HCT VFR BLD CALC: 37.3 % — SIGNIFICANT CHANGE UP (ref 34.5–45)
HGB BLD-MCNC: 12.8 G/DL — SIGNIFICANT CHANGE UP (ref 11.5–15.5)
LDH SERPL L TO P-CCNC: 155 U/L — SIGNIFICANT CHANGE UP (ref 135–225)
MCHC RBC-ENTMCNC: 29.4 PG — SIGNIFICANT CHANGE UP (ref 27–34)
MCHC RBC-ENTMCNC: 34.3 % — SIGNIFICANT CHANGE UP (ref 32–36)
MCV RBC AUTO: 85.6 FL — SIGNIFICANT CHANGE UP (ref 80–100)
METHOD TYPE: SIGNIFICANT CHANGE UP
NRBC # FLD: 0 K/UL — SIGNIFICANT CHANGE UP (ref 0–0)
ORGANISM # SPEC MICROSCOPIC CNT: SIGNIFICANT CHANGE UP
PLATELET # BLD AUTO: 189 K/UL — SIGNIFICANT CHANGE UP (ref 150–400)
PMV BLD: 10.7 FL — SIGNIFICANT CHANGE UP (ref 7–13)
POTASSIUM SERPL-MCNC: 4.1 MMOL/L — SIGNIFICANT CHANGE UP (ref 3.5–5.3)
POTASSIUM SERPL-SCNC: 4.1 MMOL/L — SIGNIFICANT CHANGE UP (ref 3.5–5.3)
RBC # BLD: 4.36 M/UL — SIGNIFICANT CHANGE UP (ref 3.8–5.2)
RBC # FLD: 13.1 % — SIGNIFICANT CHANGE UP (ref 10.3–14.5)
SARS-COV-2 RNA SPEC QL NAA+PROBE: SIGNIFICANT CHANGE UP
SODIUM SERPL-SCNC: 137 MMOL/L — SIGNIFICANT CHANGE UP (ref 135–145)
SPECIMEN SOURCE: SIGNIFICANT CHANGE UP
SPECIMEN SOURCE: SIGNIFICANT CHANGE UP
WBC # BLD: 7.9 K/UL — SIGNIFICANT CHANGE UP (ref 3.8–10.5)
WBC # FLD AUTO: 7.9 K/UL — SIGNIFICANT CHANGE UP (ref 3.8–10.5)

## 2020-06-12 PROCEDURE — 99233 SBSQ HOSP IP/OBS HIGH 50: CPT

## 2020-06-12 PROCEDURE — 93306 TTE W/DOPPLER COMPLETE: CPT | Mod: 26

## 2020-06-12 PROCEDURE — 99232 SBSQ HOSP IP/OBS MODERATE 35: CPT

## 2020-06-12 RX ORDER — LEVOTHYROXINE SODIUM 125 MCG
112 TABLET ORAL DAILY
Refills: 0 | Status: DISCONTINUED | OUTPATIENT
Start: 2020-06-12 | End: 2020-06-17

## 2020-06-12 RX ORDER — CEFTRIAXONE 500 MG/1
2 INJECTION, POWDER, FOR SOLUTION INTRAMUSCULAR; INTRAVENOUS EVERY 24 HOURS
Refills: 0 | Status: DISCONTINUED | OUTPATIENT
Start: 2020-06-12 | End: 2020-06-12

## 2020-06-12 RX ORDER — CHOLECALCIFEROL (VITAMIN D3) 125 MCG
1 CAPSULE ORAL
Qty: 0 | Refills: 0 | DISCHARGE

## 2020-06-12 RX ORDER — CEFTRIAXONE 500 MG/1
2000 INJECTION, POWDER, FOR SOLUTION INTRAMUSCULAR; INTRAVENOUS EVERY 24 HOURS
Refills: 0 | Status: DISCONTINUED | OUTPATIENT
Start: 2020-06-12 | End: 2020-06-17

## 2020-06-12 RX ORDER — ATORVASTATIN CALCIUM 80 MG/1
20 TABLET, FILM COATED ORAL AT BEDTIME
Refills: 0 | Status: DISCONTINUED | OUTPATIENT
Start: 2020-06-12 | End: 2020-06-17

## 2020-06-12 RX ORDER — ROSUVASTATIN CALCIUM 5 MG/1
1 TABLET ORAL
Qty: 0 | Refills: 0 | DISCHARGE

## 2020-06-12 RX ORDER — LEVOTHYROXINE SODIUM 125 MCG
1 TABLET ORAL
Qty: 0 | Refills: 0 | DISCHARGE

## 2020-06-12 RX ORDER — ESCITALOPRAM OXALATE 10 MG/1
5 TABLET, FILM COATED ORAL DAILY
Refills: 0 | Status: DISCONTINUED | OUTPATIENT
Start: 2020-06-12 | End: 2020-06-17

## 2020-06-12 RX ADMIN — PIPERACILLIN AND TAZOBACTAM 25 GRAM(S): 4; .5 INJECTION, POWDER, LYOPHILIZED, FOR SOLUTION INTRAVENOUS at 03:03

## 2020-06-12 RX ADMIN — Medication 1: at 17:17

## 2020-06-12 RX ADMIN — APIXABAN 5 MILLIGRAM(S): 2.5 TABLET, FILM COATED ORAL at 05:03

## 2020-06-12 RX ADMIN — Medication 1: at 13:01

## 2020-06-12 RX ADMIN — CEFTRIAXONE 100 MILLIGRAM(S): 500 INJECTION, POWDER, FOR SOLUTION INTRAMUSCULAR; INTRAVENOUS at 14:22

## 2020-06-12 RX ADMIN — APIXABAN 5 MILLIGRAM(S): 2.5 TABLET, FILM COATED ORAL at 17:17

## 2020-06-12 RX ADMIN — ATORVASTATIN CALCIUM 20 MILLIGRAM(S): 80 TABLET, FILM COATED ORAL at 21:26

## 2020-06-12 NOTE — PROGRESS NOTE ADULT - SUBJECTIVE AND OBJECTIVE BOX
Patient is a 79y old  Female who presents with a chief complaint of Concern for COVID-19 (12 Jun 2020 09:58)      SUBJECTIVE / OVERNIGHT EVENTS:    No events overnight. This AM, patient without n/v/d/cp/sob. Feels well. In good spirits.    MEDICATIONS  (STANDING):  apixaban 5 milliGRAM(s) Oral every 12 hours  cefTRIAXone   IVPB 2000 milliGRAM(s) IV Intermittent every 24 hours  dextrose 5%. 1000 milliLiter(s) (50 mL/Hr) IV Continuous <Continuous>  dextrose 50% Injectable 12.5 Gram(s) IV Push once  dextrose 50% Injectable 25 Gram(s) IV Push once  dextrose 50% Injectable 25 Gram(s) IV Push once  insulin lispro (HumaLOG) corrective regimen sliding scale   SubCutaneous three times a day before meals  insulin lispro (HumaLOG) corrective regimen sliding scale   SubCutaneous at bedtime    MEDICATIONS  (PRN):  acetaminophen   Tablet .. 650 milliGRAM(s) Oral every 6 hours PRN Temp greater or equal to 38C (100.4F)  ALBUTerol    90 MICROgram(s) HFA Inhaler 2 Puff(s) Inhalation every 6 hours PRN Shortness of Breath and/or Wheezing  dextrose 40% Gel 15 Gram(s) Oral once PRN Blood Glucose LESS THAN 70 milliGRAM(s)/deciliter  glucagon  Injectable 1 milliGRAM(s) IntraMuscular once PRN Glucose LESS THAN 70 milligrams/deciliter      PHYSICAL EXAM:  T(C): 36.8 (06-12-20 @ 05:30), Max: 37.2 (06-11-20 @ 13:58)  HR: 64 (06-12-20 @ 05:30) (56 - 64)  BP: 145/57 (06-12-20 @ 05:30) (129/68 - 145/57)  RR: 20 (06-12-20 @ 05:30) (18 - 20)  SpO2: 97% (06-12-20 @ 05:30) (97% - 98%)  I&O's Summary    11 Jun 2020 07:01  -  12 Jun 2020 07:00  --------------------------------------------------------  IN: 680 mL / OUT: 450 mL / NET: 230 mL      GENERAL: NAD, well-developed, lying in bed, pleasant  HEAD:  Atraumatic, Normocephalic, MMM  CHEST/LUNG: No use of accessory muscles, speaking in complete sentences, CTAB  COR: RR, no mrcg  PSYCH: AAOx3  NEUROLOGY: CN II-XII grossly intact, moving all extremities  SKIN: No rashes or lesions    LABS:  CAPILLARY BLOOD GLUCOSE      POCT Blood Glucose.: 153 mg/dL (12 Jun 2020 12:28)  POCT Blood Glucose.: 143 mg/dL (12 Jun 2020 08:58)  POCT Blood Glucose.: 142 mg/dL (11 Jun 2020 22:00)  POCT Blood Glucose.: 144 mg/dL (11 Jun 2020 17:36)                          12.8   7.90  )-----------( 189      ( 12 Jun 2020 06:25 )             37.3     06-12    137  |  102  |  10  ----------------------------<  145<H>  4.1   |  20<L>  |  0.65    Ca    9.3      12 Jun 2020 06:25  Mg     2.0     06-11    TPro  6.4  /  Alb  3.7  /  TBili  0.7  /  DBili  x   /  AST  38<H>  /  ALT  83<H>  /  AlkPhos  54  06-11              Culture - Urine (collected 10 Hair 2020 00:41)  Source: .Urine Clean Catch (Midstream)  Final Report (11 Jun 2020 23:16):    >100,000 CFU/ml Escherichia coli  Organism: Escherichia coli (11 Jun 2020 23:16)  Organism: Escherichia coli (11 Jun 2020 23:16)    Culture - Blood (collected 10 Hair 2020 00:24)  Source: .Blood Blood-Venous  Gram Stain (11 Jun 2020 01:13):    Growth in anaerobic bottle: Gram Negative Rods    Growth in aerobic bottle: Gram Negative Rods  Final Report (12 Jun 2020 11:45):    Growth in aerobic and anaerobic bottles: Escherichia coli    See previous culture [id number]    Culture - Blood (collected 10 Hair 2020 00:24)  Source: .Blood Blood-Peripheral  Gram Stain (10 Hair 2020 16:34):    Growth in aerobic bottle: Gram Negative Rods  Final Report (12 Jun 2020 11:45):    Growth in aerobic bottle: Escherichia coli    ***Blood Panel PCR results on this specimen are available    approximately 3 hours after the Gram stain result.***    Gram stain, PCR, and/or culture results may not always    correspond due to difference in methodologies.    ************************************************************    This PCR assay was performed using [employer].    The following targets are tested for: Enterococcus,    vancomycin resistant enterococci, Listeria monocytogenes,    coagulase negative staphylococci, S. aureus,    methicillin resistant S. aureus, Streptococcus agalactiae    (Group B), S. pneumoniae, S. pyogenes (Group A),    Acinetobacter baumannii, Enterobacter cloacae, E. coli,    Klebsiella oxytoca, K. pneumoniae, Proteus sp.,    Serratia marcescens, Haemophilus influenzae,    Neisseria meningitidis, Pseudomonas aeruginosa, Candida    albicans, C. glabrata, C krusei, C parapsilosis,    C. tropicalis and the KPC resistance gene.    "Due to technical problems, Proteus sp. will Not be reported as part of    the BCID panel until further notice"  Organism: Blood Culture PCR  Escherichia coli (12 Jun 2020 11:45)  Organism: Escherichia coli (12 Jun 2020 11:45)  Organism: Blood Culture PCR (12 Jun 2020 11:45)        RADIOLOGY & ADDITIONAL TESTS:    Telemetry Personally Reviewed -     Imaging Personally Reviewed -     Imaging Reviewed -     Consultant(s) Notes Reviewed -   EP    Care Discussed with Consultants/Other Providers -

## 2020-06-12 NOTE — PROGRESS NOTE ADULT - PROBLEM SELECTOR PLAN 4
New onset afib rvr in setting of sepsis. Now in sinus rhythm. Becomes bradycardic even with low dose metoprolol. CHADSVASC elevated. Counseled patient on risk of stroke, risks/benefits of anticoagulation. She elects anticoagulation as she is fully independent and a stroke would be devastating.   -c/w Eliquis  -EP consulted for pauses, may need leadless PPM placement  -TSH wnl  -trops elevated 2/2 demand ischemia in setting of afib rvr, no c/o cp; inconsistent with ACS

## 2020-06-12 NOTE — PROGRESS NOTE ADULT - ASSESSMENT
80 yo F with a PMHx of T2DM, OA, hypothyroid, obesity, remote h/o PE, diverticulitis s/p multiple abdominal surgeries, hiatal hernia, PREMA, lumbar spinal stenosis, HLD who p/w fever (Tm 102.3), body ache, chills and poor appetite due to E coli bacteremia. Course c/b new afib with RVR.

## 2020-06-12 NOTE — CONSULT NOTE ADULT - SUBJECTIVE AND OBJECTIVE BOX
INFECTIOUS DISEASE SERVICE INITIAL CONSULTATION NOTE    HPI:  78 yo F with a PMHx of T2DM, OA, hypothyroid, obesity, remote h/o PE, diverticulitis s/p multiple abdominal surgeries, hiatal hernia, PREMA, lumbar spinal stenosis, HLD who p/w fever (Tm 102.3), chills and poor appetite x2 days. Also endorsing diffuse body ache. Had an outpatient covid swab this afternoon (result unknown). Endorses multiple episodes of diarrhea last Friday. Unsure of her medication dosages. States she has not traveled, has been out in the community, but wears a mask and gloves. No sick contacts. No sore throat, cough, or sob. No dysuria.     ED Course:  Tm 102.8, , /78, RR 25, SpO2 99% NC @2L  Had an episode of afib RVR  150s s/p 5mg IV metop x2, 50mg PO metop x1. Also given 650mg PO acetaminophen for Tm 102.8. 162mg ASA and 1L NS. Cardiology consulted.  Lactate 3.1->2.2    CT A/P: small bowel diverticulosis. Mild bilateral ground glass opacities of the Lower lungs concerning for COVID.     CXR:  Increased bilateral lung markings. No focal consolidation. (10 Hair 2020 04:21)    Patient was admitted for sepsis. Found to have E coli bacteremia. 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   ID consulted for E coli bacteremia and pacemaker placement clearance.   The patient reports resolution of her diarrhea, and that her appetite has somewhat returned. Denies dysuria, polyuria, or foul smelling urine. She denies having any abnormal or foul tasting food recently. Had company over for dinner Friday night, no one else became ill after eating the same food.     PAST MEDICAL & SURGICAL HISTORY:  DM (diabetes mellitus)  Spinal stenosis of lumbar region  Sleep Apnea  Meniscus tear lateral left knee  h/o Prolapsed Uterus  h/o Pulmonary Embolus 30 yrs ago  h/o Phlebitis 30 yrs ago  h/o Cholecystitis  Hiatal Hernia  Hyperlipidemia  Arthritis  Diverticulitis  Adult Hypothyroidism  Right hand surgery 1999 , h/o arthritis  Left hand surgery 2004: h/o arthritis hand  h/o cholecystectomy  2010  S/P Hysterectomy  S/P Appendectomy  S/P Colon Resection      REVIEW OF SYSTEMS:  Constitutional: +weakness, no fevers, +chills  Dermatologic: no rash  Respiratory: no SOB, no cough  Cardiovascular: no chest pain, no palpitations  Gastrointestinal: no nausea, no vomiting, +diarrhea  Genitourinary: no dysuria, no urinary frequency, no hematuria, no urinary retention  Musculoskeletal:	no weakness, no joint swelling/pain  Neurological: no focal weakness or numbness  Endocrine: no polyuria, no polydipsia    ACTIVE ANTIMICROBIAL/ANTIBIOTIC MEDICATIONS:  cefTRIAXone   IVPB 2000 milliGRAM(s) IV Intermittent every 24 hours      OTHER MEDICATIONS:  acetaminophen   Tablet .. 650 milliGRAM(s) Oral every 6 hours PRN  ALBUTerol    90 MICROgram(s) HFA Inhaler 2 Puff(s) Inhalation every 6 hours PRN  apixaban 5 milliGRAM(s) Oral every 12 hours  atorvastatin 20 milliGRAM(s) Oral at bedtime  dextrose 40% Gel 15 Gram(s) Oral once PRN  dextrose 5%. 1000 milliLiter(s) IV Continuous <Continuous>  dextrose 50% Injectable 12.5 Gram(s) IV Push once  dextrose 50% Injectable 25 Gram(s) IV Push once  dextrose 50% Injectable 25 Gram(s) IV Push once  escitalopram 5 milliGRAM(s) Oral daily  glucagon  Injectable 1 milliGRAM(s) IntraMuscular once PRN  insulin lispro (HumaLOG) corrective regimen sliding scale   SubCutaneous three times a day before meals  insulin lispro (HumaLOG) corrective regimen sliding scale   SubCutaneous at bedtime  levothyroxine 112 MICROGram(s) Oral daily      ALLERGIES:  Allergies    No Known Allergies    Intolerances        SOCIAL HISTORY: Lives in a house in [place] with her . Never smoker. Drinks a glass of wine daily. Used to work in real estate sales, . 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     FAMILY HISTORY:  Mother required a pacemaker  No recent febrile illnesses in family members      VITAL SIGNS:  ICU Vital Signs Last 24 Hrs  T(C): 36.5 (12 Jun 2020 14:20), Max: 37 (11 Jun 2020 17:24)  T(F): 97.7 (12 Jun 2020 14:20), Max: 98.6 (11 Jun 2020 17:24)  HR: 61 (12 Jun 2020 14:20) (56 - 64)  BP: 142/49 (12 Jun 2020 14:20) (129/68 - 145/57)  BP(mean): 81 (12 Jun 2020 05:30) (81 - 81)  ABP: --  ABP(mean): --  RR: 19 (12 Jun 2020 14:20) (18 - 20)  SpO2: 96% (12 Jun 2020 14:20) (96% - 98%)      PHYSICAL EXAM:  Constitutional: WDWN  Head: NC/AT  Eyes: PERRLA, EOMI; anicteric slcera  ENMT: no rhinorrhea; no sinus tenderness on palpation; no oropharyngeal lesions, erythema, or exudates	  Neck: supple; no JVD or LAD  Respiratory: CTA B/L  Cardiovascular: +S1/S2, RRR; no appreciable murmurs  Gastrointestinal: soft, NT/ND; +BSx4, no HSM  Extremities: WWP; no clubbing, cyanosis, or edema  Vascular: 2+ radial, DP/PT pulses B/L  Dermatologic: skin warm and dry; no visible rashes or lesions  Neurologic: AAOx3; no focal deficits    LABS:                        12.8   7.90  )-----------( 189      ( 12 Jun 2020 06:25 )             37.3     06-12    137  |  102  |  10  ----------------------------<  145<H>  4.1   |  20<L>  |  0.65    Ca    9.3      12 Jun 2020 06:25  Mg     2.0     06-11    TPro  6.4  /  Alb  3.7  /  TBili  0.7  /  DBili  x   /  AST  38<H>  /  ALT  83<H>  /  AlkPhos  54  06-11      COVID-19 PCR . (06.10.20 @ 01:08)    COVID-19 PCR: [facility]      MICROBIOLOGY:    Culture - Urine (collected 06-10-20 @ 00:41)  Source: .Urine Clean Catch (Midstream)  Final Report (06-11-20 @ 23:16):    >100,000 CFU/ml Escherichia coli  Organism: Escherichia coli (06-11-20 @ 23:16)  Organism: Escherichia coli (06-11-20 @ 23:16)      -  Amikacin: S <=16      -  Ampicillin: S <=8 These ampicillin results predict results for amoxicillin      -  Ampicillin/Sulbactam: S <=8/4 Enterobacter, Citrobacter, and Serratia may develop resistance during prolonged therapy (3-4 days)      -  Aztreonam: S <=4      -  Cefazolin: S <=8 (MIC_CL_COM_ENTERIC_CEFAZU) For uncomplicated UTI with K. pneumoniae, E. coli, or P. mirablis: JULIET <=16 is sensitive and JULIET >=32 is resistant. This also predicts results for oral agents cefaclor, cefdinir, cefpodoxime, cefprozil, cefuroxime axetil, cephalexin and locarbef for uncomplicated UTI. Note that some isolates may be susceptible to these agents while testing resistant to cefazolin.      -  Cefepime: S <=4      -  Cefoxitin: S <=8      -  Ceftriaxone: S <=1 Enterobacter, Citrobacter, and Serratia may develop resistance during prolonged therapy      -  Ciprofloxacin: S <=1      -  Gentamicin: S <=4      -  Imipenem: S <=1      -  Levofloxacin: S <=2      -  Meropenem: S <=1      -  Nitrofurantoin: S <=32 Should not be used to treat pyelonephritis      -  Piperacillin/Tazobactam: S <=16      -  Tigecycline: S <=2      -  Tobramycin: S <=4      -  Trimethoprim/Sulfamethoxazole: S <=2/38      Method Type: JULIET    Culture - Blood (collected 06-10-20 @ 00:24)  Source: .Blood Blood-Venous  Gram Stain (06-11-20 @ 01:13):    Growth in anaerobic bottle: Gram Negative Rods    Growth in aerobic bottle: Gram Negative Rods  Final Report (06-12-20 @ 11:45):    Growth in aerobic and anaerobic bottles: Escherichia coli    See previous culture [id number]    Culture - Blood (collected 06-10-20 @ 00:24)  Source: .Blood Blood-Peripheral  Gram Stain (06-10-20 @ 16:34):    Growth in aerobic bottle: Gram Negative Rods  Final Report (06-12-20 @ 11:45):    Growth in aerobic bottle: Escherichia coli    ***Blood Panel PCR results on this specimen are available    approximately 3 hours after the Gram stain result.***    Gram stain, PCR, and/or culture results may not always    correspond due to difference in methodologies.    ************************************************************    This PCR assay was performed using [email or web address].    The following targets are tested for: Enterococcus,    vancomycin resistant enterococci, Listeria monocytogenes,    coagulase negative staphylococci, S. aureus,    methicillin resistant S. aureus, Streptococcus agalactiae    (Group B), S. pneumoniae, S. pyogenes (Group A),    Acinetobacter baumannii, Enterobacter cloacae, E. coli,    Klebsiella oxytoca, K. pneumoniae, Proteus sp.,    Serratia marcescens, Haemophilus influenzae,    Neisseria meningitidis, Pseudomonas aeruginosa, Candida    albicans, C. glabrata, C krusei, C parapsilosis,    C. tropicalis and the KPC resistance gene.    "Due to technical problems, Proteus sp. will Not be reported as part of    the BCID panel until further notice"  Organism: Blood Culture PCR  Escherichia coli (06-12-20 @ 11:45)  Organism: Escherichia coli (06-12-20 @ 11:45)      -  Amikacin: S <=16      -  Amoxicillin/Clavulanic Acid: S <=8/4      -  Ampicillin: S <=8 These ampicillin results predict results for amoxicillin      -  Ampicillin/Sulbactam: S <=4/2 Enterobacter, Citrobacter, and Serratia may develop resistance during prolonged therapy (3-4 days)      -  Aztreonam: S <=4      -  Cefazolin: S <=2 Enterobacter, Citrobacter, and Serratia may develop resistance during prolonged therapy (3-4 days)      -  Cefepime: S <=2      -  Cefotaxime: S <=2      -  Cefoxitin: S <=8      -  Ceftazidime: S <=1      -  Ceftriaxone: S <=1 Enterobacter, Citrobacter, and Serratia may develop resistance during prolonged therapy      -  Cefuroxime: S <=4      -  Ciprofloxacin: S <=0.25      -  Ertapenem: S <=0.5      -  Gentamicin: S <=2      -  Levofloxacin: S <=0.5      -  Meropenem: S <=1      -  Minocycline: S <=4      -  Piperacillin/Tazobactam: S <=8      -  Tigecycline: S <=2      -  Tobramycin: S <=2      -  Trimethoprim/Sulfamethoxazole: S <=0.5/9.5      Method Type: JULIET  Organism: Blood Culture PCR (06-12-20 @ 11:45)      -  Escherichia coli: Detec      Method Type: PCR        RADIOLOGY & ADDITIONAL STUDIES:    < from: CT Angio Chest w/ IV Cont (06.10.20 @ 18:02) >  IMPRESSION:     1.  No pulmonary embolus.    2.  Clear lungs.    < end of copied text >    < from: CT Abdomen and Pelvis w/ IV Cont (06.10.20 @ 03:16) >  IMPRESSION:    Small bowel and colonic diverticulosis without acute diverticulitis. Bowel postoperative changes without obstruction or extraluminal collection.    Mild bilateral peripheral ground glass opacities are nonspecific and may be seen in the setting of viral pneumonia such as COVID-19. Correlate with clinical parameters and laboratory values. Consider short-term chest radiograph follow-up as indicated.    < end of copied text > INFECTIOUS DISEASE SERVICE INITIAL CONSULTATION NOTE    HPI:  78 yo F with a PMHx of T2DM, OA, hypothyroid, obesity, remote h/o PE, diverticulitis s/p multiple abdominal surgeries, hiatal hernia, PREMA, lumbar spinal stenosis, HLD who p/w fever (Tm 102.3), chills and poor appetite x2 days. Also endorsing diffuse body ache. Had an outpatient covid swab this afternoon (result unknown). Endorses multiple episodes of diarrhea last Friday. Unsure of her medication dosages. States she has not traveled, has been out in the community, but wears a mask and gloves. No sick contacts. No sore throat, cough, or sob. No dysuria.     ED Course:  Tm 102.8, , /78, RR 25, SpO2 99% NC @2L  Had an episode of afib RVR  150s s/p 5mg IV metop x2, 50mg PO metop x1. Also given 650mg PO acetaminophen for Tm 102.8. 162mg ASA and 1L NS. Cardiology consulted.  Lactate 3.1->2.2    CT A/P: small bowel diverticulosis. Mild bilateral ground glass opacities of the Lower lungs concerning for COVID.     CXR:  Increased bilateral lung markings. No focal consolidation. (10 Hair 2020 04:21)    Patient was admitted for sepsis. Found to have E coli bacteremia. 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   ID consulted for E coli bacteremia and pacemaker placement clearance.   The patient reports resolution of her diarrhea, and that her appetite has somewhat returned. Denies dysuria, polyuria, or foul smelling urine. She denies having any abnormal or foul tasting food recently. Had company over for dinner Friday night, no one else became ill after eating the same food.     PAST MEDICAL & SURGICAL HISTORY:  DM (diabetes mellitus)  Spinal stenosis of lumbar region  Sleep Apnea  Meniscus tear lateral left knee  h/o Prolapsed Uterus  h/o Pulmonary Embolus 30 yrs ago  h/o Phlebitis 30 yrs ago  h/o Cholecystitis  Hiatal Hernia  Hyperlipidemia  Arthritis  Diverticulitis  Adult Hypothyroidism  Right hand surgery 1999 , h/o arthritis  Left hand surgery 2004: h/o arthritis hand  h/o cholecystectomy  2010  S/P Hysterectomy  S/P Appendectomy  S/P Colon Resection      REVIEW OF SYSTEMS:  Constitutional: +weakness, no fevers, +chills  Head: no trauma  Eyes: no eye pain or change in the vision  ENT: no sore throat or rhonorrhea  Dermatologic: no rash  Respiratory: no SOB, no cough  Cardiovascular: no chest pain, no palpitations  Gastrointestinal: no nausea, no vomiting, +diarrhea  Genitourinary: no dysuria, no urinary frequency, no hematuria, no urinary retention  Musculoskeletal:	no weakness, no joint swelling/pain  Neurological: no focal weakness or numbness  Endocrine: no polyuria, no polydipsia  psych: no anxiety or depression     ACTIVE ANTIMICROBIAL/ANTIBIOTIC MEDICATIONS:  cefTRIAXone   IVPB 2000 milliGRAM(s) IV Intermittent every 24 hours      OTHER MEDICATIONS:  acetaminophen   Tablet .. 650 milliGRAM(s) Oral every 6 hours PRN  ALBUTerol    90 MICROgram(s) HFA Inhaler 2 Puff(s) Inhalation every 6 hours PRN  apixaban 5 milliGRAM(s) Oral every 12 hours  atorvastatin 20 milliGRAM(s) Oral at bedtime  dextrose 40% Gel 15 Gram(s) Oral once PRN  dextrose 5%. 1000 milliLiter(s) IV Continuous <Continuous>  dextrose 50% Injectable 12.5 Gram(s) IV Push once  dextrose 50% Injectable 25 Gram(s) IV Push once  dextrose 50% Injectable 25 Gram(s) IV Push once  escitalopram 5 milliGRAM(s) Oral daily  glucagon  Injectable 1 milliGRAM(s) IntraMuscular once PRN  insulin lispro (HumaLOG) corrective regimen sliding scale   SubCutaneous three times a day before meals  insulin lispro (HumaLOG) corrective regimen sliding scale   SubCutaneous at bedtime  levothyroxine 112 MICROGram(s) Oral daily      ALLERGIES:  Allergies    No Known Allergies    Intolerances        SOCIAL HISTORY: Lives in a house in [place] with her . Never smoker. Drinks a glass of wine daily. Used to work in real estate sales, . 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     FAMILY HISTORY:  Mother required a pacemaker  No recent febrile illnesses in family members      VITAL SIGNS:  ICU Vital Signs Last 24 Hrs  T(C): 36.5 (12 Jun 2020 14:20), Max: 37 (11 Jun 2020 17:24)  T(F): 97.7 (12 Jun 2020 14:20), Max: 98.6 (11 Jun 2020 17:24)  HR: 61 (12 Jun 2020 14:20) (56 - 64)  BP: 142/49 (12 Jun 2020 14:20) (129/68 - 145/57)  BP(mean): 81 (12 Jun 2020 05:30) (81 - 81)  ABP: --  ABP(mean): --  RR: 19 (12 Jun 2020 14:20) (18 - 20)  SpO2: 96% (12 Jun 2020 14:20) (96% - 98%)      PHYSICAL EXAM:  Constitutional: WDWN  Head: NC/AT  Eyes: PERRLA, EOMI; anicteric slcera  ENMT: no rhinorrhea; no sinus tenderness on palpation; no oropharyngeal lesions, erythema, or exudates	  Neck: supple; no JVD or LAD  Respiratory: CTA B/L  Cardio: +S1/S2, RRR; no appreciable murmurs  Gastrointestinal: soft, NT/ND; +BSx4, no HSM  : no suprapubic or CVA tenderness, no ritchie  Extremities: WWP; no clubbing, cyanosis, or edema  Vascular: 2+ radial, DP/PT pulses B/L  Dermatologic: skin warm and dry; no visible rashes or lesions  Neurologic: AAOx3; no focal deficits  psych: normal affect    LABS:                        12.8   7.90  )-----------( 189      ( 12 Jun 2020 06:25 )             37.3     06-12    137  |  102  |  10  ----------------------------<  145<H>  4.1   |  20<L>  |  0.65    Ca    9.3      12 Jun 2020 06:25  Mg     2.0     06-11    TPro  6.4  /  Alb  3.7  /  TBili  0.7  /  DBili  x   /  AST  38<H>  /  ALT  83<H>  /  AlkPhos  54  06-11      COVID-19 PCR . (06.10.20 @ 01:08)    COVID-19 PCR: NotDetec      MICROBIOLOGY:    Culture - Urine (collected 06-10-20 @ 00:41)  Source: .Urine Clean Catch (Midstream)  Final Report (06-11-20 @ 23:16):    >100,000 CFU/ml Escherichia coli  Organism: Escherichia coli (06-11-20 @ 23:16)  Organism: Escherichia coli (06-11-20 @ 23:16)      -  Amikacin: S <=16      -  Ampicillin: S <=8 These ampicillin results predict results for amoxicillin      -  Ampicillin/Sulbactam: S <=8/4 Enterobacter, Citrobacter, and Serratia may develop resistance during prolonged therapy (3-4 days)      -  Aztreonam: S <=4      -  Cefazolin: S <=8 (MIC_CL_COM_ENTERIC_CEFAZU) For uncomplicated UTI with K. pneumoniae, E. coli, or P. mirablis: JULIET <=16 is sensitive and JULIET >=32 is resistant. This also predicts results for oral agents cefaclor, cefdinir, cefpodoxime, cefprozil, cefuroxime axetil, cephalexin and locarbef for uncomplicated UTI. Note that some isolates may be susceptible to these agents while testing resistant to cefazolin.      -  Cefepime: S <=4      -  Cefoxitin: S <=8      -  Ceftriaxone: S <=1 Enterobacter, Citrobacter, and Serratia may develop resistance during prolonged therapy      -  Ciprofloxacin: S <=1      -  Gentamicin: S <=4      -  Imipenem: S <=1      -  Levofloxacin: S <=2      -  Meropenem: S <=1      -  Nitrofurantoin: S <=32 Should not be used to treat pyelonephritis      -  Piperacillin/Tazobactam: S <=16      -  Tigecycline: S <=2      -  Tobramycin: S <=4      -  Trimethoprim/Sulfamethoxazole: S <=2/38      Method Type: JULIET    Culture - Blood (collected 06-10-20 @ 00:24)  Source: .Blood Blood-Venous  Gram Stain (06-11-20 @ 01:13):    Growth in anaerobic bottle: Gram Negative Rods    Growth in aerobic bottle: Gram Negative Rods  Final Report (06-12-20 @ 11:45):    Growth in aerobic and anaerobic bottles: Escherichia coli    See previous culture [id number]    Culture - Blood (collected 06-10-20 @ 00:24)  Source: .Blood Blood-Peripheral  Gram Stain (06-10-20 @ 16:34):    Growth in aerobic bottle: Gram Negative Rods  Final Report (06-12-20 @ 11:45):    Growth in aerobic bottle: Escherichia coli    ***Blood Panel PCR results on this specimen are available    approximately 3 hours after the Gram stain result.***    Gram stain, PCR, and/or culture results may not always    correspond due to difference in methodologies.    ************************************************************    This PCR assay was performed using [employer].    The following targets are tested for: Enterococcus,    vancomycin resistant enterococci, Listeria monocytogenes,    coagulase negative staphylococci, S. aureus,    methicillin resistant S. aureus, Streptococcus agalactiae    (Group B), S. pneumoniae, S. pyogenes (Group A),    Acinetobacter baumannii, Enterobacter cloacae, E. coli,    Klebsiella oxytoca, K. pneumoniae, Proteus sp.,    Serratia marcescens, Haemophilus influenzae,    Neisseria meningitidis, Pseudomonas aeruginosa, Candida    albicans, C. glabrata, C krusei, C parapsilosis,    C. tropicalis and the KPC resistance gene.    "Due to technical problems, Proteus sp. will Not be reported as part of    the BCID panel until further notice"  Organism: Blood Culture PCR  Escherichia coli (06-12-20 @ 11:45)  Organism: Escherichia coli (06-12-20 @ 11:45)      -  Amikacin: S <=16      -  Amoxicillin/Clavulanic Acid: S <=8/4      -  Ampicillin: S <=8 These ampicillin results predict results for amoxicillin      -  Ampicillin/Sulbactam: S <=4/2 Enterobacter, Citrobacter, and Serratia may develop resistance during prolonged therapy (3-4 days)      -  Aztreonam: S <=4      -  Cefazolin: S <=2 Enterobacter, Citrobacter, and Serratia may develop resistance during prolonged therapy (3-4 days)      -  Cefepime: S <=2      -  Cefotaxime: S <=2      -  Cefoxitin: S <=8      -  Ceftazidime: S <=1      -  Ceftriaxone: S <=1 Enterobacter, Citrobacter, and Serratia may develop resistance during prolonged therapy      -  Cefuroxime: S <=4      -  Ciprofloxacin: S <=0.25      -  Ertapenem: S <=0.5      -  Gentamicin: S <=2      -  Levofloxacin: S <=0.5      -  Meropenem: S <=1      -  Minocycline: S <=4      -  Piperacillin/Tazobactam: S <=8      -  Tigecycline: S <=2      -  Tobramycin: S <=2      -  Trimethoprim/Sulfamethoxazole: S <=0.5/9.5      Method Type: JULIET  Organism: Blood Culture PCR (06-12-20 @ 11:45)      -  Escherichia coli: Detec      Method Type: PCR        RADIOLOGY & ADDITIONAL STUDIES:    < from: CT Angio Chest w/ IV Cont (06.10.20 @ 18:02) >  IMPRESSION:     1.  No pulmonary embolus.    2.  Clear lungs.    < end of copied text >    < from: CT Abdomen and Pelvis w/ IV Cont (06.10.20 @ 03:16) >  IMPRESSION:    Small bowel and colonic diverticulosis without acute diverticulitis. Bowel postoperative changes without obstruction or extraluminal collection.    Mild bilateral peripheral ground glass opacities are nonspecific and may be seen in the setting of viral pneumonia such as COVID-19. Correlate with clinical parameters and laboratory values. Consider short-term chest radiograph follow-up as indicated.    < end of copied text >

## 2020-06-12 NOTE — CHART NOTE - NSCHARTNOTEFT_GEN_A_CORE
ID consulted for clearance in preparation for PPM placement with EP. Will follow for recommendations.

## 2020-06-12 NOTE — PROGRESS NOTE ADULT - SUBJECTIVE AND OBJECTIVE BOX
Patient is seen and examined. Denies any chest pian, SOB, palpitations or dizziness.     PAST MEDICAL & SURGICAL HISTORY:  DM (diabetes mellitus)  Spinal stenosis of lumbar region  Sleep Apnea  Meniscus tear lateral left knee  h/o Prolapsed Uterus  h/o Pulmonary Embolus 30 yrs ago  h/o Phlebitis 30 yrs ago  h/o Cholecystitis  Hiatal Hernia  Hyperlipidemia  Arthritis  Diverticulitis  Adult Hypothyroidism  Right hand surgery 1999 , h/o arthritis  Left hand surgery 2004: h/o arthritis hand  h/o cholecystectomy  2010  S/P Hysterectomy  S/P Appendectomy  S/P Colon Resection      MEDICATIONS  (STANDING):  apixaban 5 milliGRAM(s) Oral every 12 hours  atorvastatin 20 milliGRAM(s) Oral at bedtime  cefTRIAXone   IVPB 2000 milliGRAM(s) IV Intermittent every 24 hours  dextrose 5%. 1000 milliLiter(s) (50 mL/Hr) IV Continuous <Continuous>  dextrose 50% Injectable 12.5 Gram(s) IV Push once  dextrose 50% Injectable 25 Gram(s) IV Push once  dextrose 50% Injectable 25 Gram(s) IV Push once  escitalopram 5 milliGRAM(s) Oral daily  insulin lispro (HumaLOG) corrective regimen sliding scale   SubCutaneous three times a day before meals  insulin lispro (HumaLOG) corrective regimen sliding scale   SubCutaneous at bedtime  levothyroxine 112 MICROGram(s) Oral daily    MEDICATIONS  (PRN):  acetaminophen   Tablet .. 650 milliGRAM(s) Oral every 6 hours PRN Temp greater or equal to 38C (100.4F)  ALBUTerol    90 MICROgram(s) HFA Inhaler 2 Puff(s) Inhalation every 6 hours PRN Shortness of Breath and/or Wheezing  dextrose 40% Gel 15 Gram(s) Oral once PRN Blood Glucose LESS THAN 70 milliGRAM(s)/deciliter  glucagon  Injectable 1 milliGRAM(s) IntraMuscular once PRN Glucose LESS THAN 70 milligrams/deciliter    Vital Signs Last 24 Hrs  T(C): 36.5 (12 Jun 2020 14:20), Max: 37 (11 Jun 2020 17:24)  T(F): 97.7 (12 Jun 2020 14:20), Max: 98.6 (11 Jun 2020 17:24)  HR: 61 (12 Jun 2020 14:20) (56 - 64)  BP: 142/49 (12 Jun 2020 14:20) (129/68 - 145/57)  BP(mean): 81 (12 Jun 2020 05:30) (81 - 81)  RR: 19 (12 Jun 2020 14:20) (18 - 20)  SpO2: 96% (12 Jun 2020 14:20) (96% - 98%)    INTERPRETATION OF TELEMETRY: Sinus rhythm with HR 50s-80s; PAT with VR 140s    LABS:                        12.8   7.90  )-----------( 189      ( 12 Jun 2020 06:25 )             37.3     06-12    137  |  102  |  10  ----------------------------<  145<H>  4.1   |  20<L>  |  0.65    Ca    9.3      12 Jun 2020 06:25  Mg     2.0     06-11    TPro  6.4  /  Alb  3.7  /  TBili  0.7  /  DBili  x   /  AST  38<H>  /  ALT  83<H>  /  AlkPhos  54  06-11      I&O's Summary    11 Jun 2020 07:01  -  12 Jun 2020 07:00  --------------------------------------------------------  IN: 680 mL / OUT: 450 mL / NET: 230 mL      PHYSICAL EXAM:    GENERAL: In no apparent distress, well nourished, and hydrated.  HEART: Regular rate and rhythm; No murmurs, rubs, or gallops.  PULMONARY: Clear to auscultation and percussion.  No rales, wheezing, or rhonchi bilaterally.  ABDOMEN: Soft, Nontender, Nondistended; Bowel sounds present  EXTREMITIES:  2+ Peripheral Pulses, No clubbing, cyanosis, or edema

## 2020-06-12 NOTE — PROGRESS NOTE ADULT - SUBJECTIVE AND OBJECTIVE BOX
Patient seen and examined at bedside.    Overnight Events:  no issues overnight    ROS: no chest pain, lightheadness, palpitations    Current Meds:  acetaminophen   Tablet .. 650 milliGRAM(s) Oral every 6 hours PRN  ALBUTerol    90 MICROgram(s) HFA Inhaler 2 Puff(s) Inhalation every 6 hours PRN  apixaban 5 milliGRAM(s) Oral every 12 hours  dextrose 40% Gel 15 Gram(s) Oral once PRN  dextrose 5%. 1000 milliLiter(s) IV Continuous <Continuous>  dextrose 50% Injectable 12.5 Gram(s) IV Push once  dextrose 50% Injectable 25 Gram(s) IV Push once  dextrose 50% Injectable 25 Gram(s) IV Push once  glucagon  Injectable 1 milliGRAM(s) IntraMuscular once PRN  insulin lispro (HumaLOG) corrective regimen sliding scale   SubCutaneous three times a day before meals  insulin lispro (HumaLOG) corrective regimen sliding scale   SubCutaneous at bedtime  piperacillin/tazobactam IVPB.. 3.375 Gram(s) IV Intermittent every 8 hours    Vitals:  T(F): 98.3 (06-12), Max: 98.9 (06-11)  HR: 64 (06-12) (56 - 64)  BP: 145/57 (06-12) (129/68 - 145/57)  RR: 20 (06-12)  SpO2: 97% (06-12)  I&O's Summary    11 Jun 2020 07:01  -  12 Jun 2020 07:00  --------------------------------------------------------  IN: 680 mL / OUT: 450 mL / NET: 230 mL    Physical Exam:  Appearance: Normal	  Cardiovascular: Normal S1 S2, No JVD, No murmurs, No edema  Respiratory: Lungs clear to auscultation	  Psychiatry: A & O x 3, Mood & affect appropriate  Gastrointestinal:  Soft, Non-tender, + BS	  Skin: No rashes, No ecchymoses, No cyanosis	  Neurologic: Non-focal, A&Ox3, nonfocal, WHITNEY x 4  Extremities: Normal range of motion, No clubbing, cyanosis or edema  Vascular: Peripheral pulses palpable 2+ bilaterally                        12.8   7.90  )-----------( 189      ( 12 Jun 2020 06:25 )             37.3     06-12    137  |  102  |  10  ----------------------------<  145<H>  4.1   |  20<L>  |  0.65    Ca    9.3      12 Jun 2020 06:25  Mg     2.0     06-11    TPro  6.4  /  Alb  3.7  /  TBili  0.7  /  DBili  x   /  AST  38<H>  /  ALT  83<H>  /  AlkPhos  54  06-11    CARDIAC MARKERS ( 10 Hair 2020 05:30 )  x     / x     / x     / 86 u/L / x     / x        New ECG(s): Personally reviewed    Echo: pending      Interpretation of Telemetry: sinus 30-60s, bradycardia was asymptomatic during sleep, no pause alarms    Assessment and Plan:    78 y/o female PMH DM, HLD, OA, cdiff?, Hypothyroid, Obesity, REMOTE PE and Phlebitis years ago, HX of Diverticulitis with multiple abdominal surgeries, Hiatal Hernia, PREMA not on CPAP, Spinal Stenosis L/S, brought from home by EMS c/o fever tmax 102.3, associated w/ fever, chills, and decreased PO intake,. Also endorses that on Friday she had multiple episodes of diarrhea. Now noted to be in and out of Afib rvr and sinus david HR 55-67 in the setting of infectious process.      Paroxysmal Atrial fibrillation with RVR, currently rate controlled    - had 4 sec pause over the weekend, during NSR, some asymptomatic bradycardia, metop was held  - CHASVASC score elevated, anticoagulation indicated, would recommend Eliquis 5 mg BID  - echo pending    EP following for her rate related issues.  Please call cardiology for any further cardiac issues or concerns.  We will sign off.    French Ruiz MD  Cardiology Fellow   [phone]  For all other Cardiology service contact information, go to [email or web address] and use "[email or web address]" to login.

## 2020-06-12 NOTE — PROGRESS NOTE ADULT - ASSESSMENT
80 y/o female PMHX DM, HLD, C. diff?, Hypothyroid, Obesity, REMOTE PE and phlebitis, PREMA not on CPAP, presented with c/o fever (102.3), found to have E. Coli bacteremia and being treated with antibiotics. Noted to have Afib RVR and asymptomatic sinus bradycardia with HR as low as 28 in the setting of sleep apnea, and metoprolol use. Metoprolol on hold now. Currently telemetry reveals sinus rhythm with HR 50s-80s; runs of PAT  - Continue to monitor on telemetry for symptomatic tachy/david arrhythmia  - Continue to hold AV beau blockers  - Continue anticoagulation with apixaban 5mg BID if not contraindicated. 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 Discussed with patient regarding risks/benefits and laternatives of the procedure and she is agreeable.    - COVID testing on Sunday if cleared for PM by ID   - Will follow up

## 2020-06-12 NOTE — CONSULT NOTE ADULT - ATTENDING COMMENTS
79 f with DM, hypothyroidism, diverticulitis and multiple bowel resections p/w chills, body pain and no other symptoms  here febrile to 102.8 normal WBC  u/a with 11-25 WBC, urine and blood cx: E-coli (pan S)  abd/pelvis CT negative, chest CTA: no PE  COVID negative  pt found to have a-fib with RVR and then david now plan for PPM    fever, tachycardia, sepsis  E-coli bacteremia likely due to UTI but no urinary symptoms and no other focus of infection  monica plan for PPM    * c/w ceftriaxone 2 q d  * f/u the repeat blood culture  * hold off on PPM as pt still bacteremic    The above assessment and plan was discussed with the primary team    Jolynn Aguilar MD  Pager [phone]  After 5pm and on weekends call [phone]
personally saw and examined patient  labs/vitals reviewed  agree with above assessment and plan  79F with sepsis, unclear etiology of infection as of now, ?GI source, ?covid  pt with pAfib on tele, currently in SR  AVN blockers on hold for episodes of bradycardia  cont to monitor on tele  cont a/c for chadsvacs >3  w/u and tx of underlying infection per primary team
78 y/o female PMHX DM, HLD, C. diff?, Hypothyroid, Obesity, REMOTE PE and phlebitis, PREMA not on CPAP, presented with c/o fever (102.3), found to have E. Coli bacteremia and being treated with antibiotics. Noted to have Afib RVR and asymptomatic sinus bradycardia with HR as low as 28 in the setting of sleep apnea, and metoprolol use. Metoprolol on hold now. Currently telemetry reveals sinus rhythm with HR 60s-70s when patient is awake. TSH normal. Will follow on tele for further david events. May need Micra PPM when bacteremia resolves. Will follow.

## 2020-06-12 NOTE — CONSULT NOTE ADULT - ASSESSMENT
Mrs Erwin is a 79 year old woman with DM, hypothyroidism, HLD, and distant history diverticulitis requiring surgery who presented with chills and body aches. She has been found to have E coli bacteremia. Not clear if the E coli is from the urine, as she did not have any urinary symptoms. CT AP showed no abnormalities. Fever curve downtrending. Will need negative blood cultures before pacemaker placement.     E coli bacteremia   - Continue with ceftriaxone 2g q 24 hours  - Repeat blood cultures q 48 hours until cleared   - Follow up cultures  - Monitor for fevers  - Trend WBCs    Pending PPM placement  - Send repeat blood cultures    R/O COVID - unlikely   - Follow up repeat swab    Tiesha Mirza, PGY-4  Infectious Disease Fellow   Pager: [phone]  After 5pm/weekends: [phone]

## 2020-06-12 NOTE — PROGRESS NOTE ADULT - PROBLEM SELECTOR PLAN 2
+fever, HR, and lactate due to E coli bacteremia   -suspect urine source given Ucx with >100K CFU/mL GNR  -Bcx sensitivities returned, will narrow coverage to 2gm CTX q24h, awaiting negative cultures before transition to oral regimen; clinically improving and afebrile

## 2020-06-13 LAB
ALBUMIN SERPL ELPH-MCNC: 3.8 G/DL — SIGNIFICANT CHANGE UP (ref 3.3–5)
ALP SERPL-CCNC: 57 U/L — SIGNIFICANT CHANGE UP (ref 40–120)
ALT FLD-CCNC: 69 U/L — HIGH (ref 4–33)
ANION GAP SERPL CALC-SCNC: 13 MMO/L — SIGNIFICANT CHANGE UP (ref 7–14)
AST SERPL-CCNC: 33 U/L — HIGH (ref 4–32)
BILIRUB SERPL-MCNC: 0.5 MG/DL — SIGNIFICANT CHANGE UP (ref 0.2–1.2)
BLD GP AB SCN SERPL QL: NEGATIVE — SIGNIFICANT CHANGE UP
BUN SERPL-MCNC: 12 MG/DL — SIGNIFICANT CHANGE UP (ref 7–23)
CALCIUM SERPL-MCNC: 9.5 MG/DL — SIGNIFICANT CHANGE UP (ref 8.4–10.5)
CHLORIDE SERPL-SCNC: 105 MMOL/L — SIGNIFICANT CHANGE UP (ref 98–107)
CO2 SERPL-SCNC: 21 MMOL/L — LOW (ref 22–31)
CREAT SERPL-MCNC: 0.61 MG/DL — SIGNIFICANT CHANGE UP (ref 0.5–1.3)
CULTURE RESULTS: SIGNIFICANT CHANGE UP
GLUCOSE SERPL-MCNC: 159 MG/DL — HIGH (ref 70–99)
GRAM STN FLD: SIGNIFICANT CHANGE UP
HCT VFR BLD CALC: 38.8 % — SIGNIFICANT CHANGE UP (ref 34.5–45)
HGB BLD-MCNC: 12.8 G/DL — SIGNIFICANT CHANGE UP (ref 11.5–15.5)
MCHC RBC-ENTMCNC: 28.1 PG — SIGNIFICANT CHANGE UP (ref 27–34)
MCHC RBC-ENTMCNC: 33 % — SIGNIFICANT CHANGE UP (ref 32–36)
MCV RBC AUTO: 85.1 FL — SIGNIFICANT CHANGE UP (ref 80–100)
NRBC # FLD: 0 K/UL — SIGNIFICANT CHANGE UP (ref 0–0)
ORGANISM # SPEC MICROSCOPIC CNT: SIGNIFICANT CHANGE UP
ORGANISM # SPEC MICROSCOPIC CNT: SIGNIFICANT CHANGE UP
PLATELET # BLD AUTO: 220 K/UL — SIGNIFICANT CHANGE UP (ref 150–400)
PMV BLD: 10.2 FL — SIGNIFICANT CHANGE UP (ref 7–13)
POTASSIUM SERPL-MCNC: 4.2 MMOL/L — SIGNIFICANT CHANGE UP (ref 3.5–5.3)
POTASSIUM SERPL-SCNC: 4.2 MMOL/L — SIGNIFICANT CHANGE UP (ref 3.5–5.3)
PROT SERPL-MCNC: 7.4 G/DL — SIGNIFICANT CHANGE UP (ref 6–8.3)
RBC # BLD: 4.56 M/UL — SIGNIFICANT CHANGE UP (ref 3.8–5.2)
RBC # FLD: 13.1 % — SIGNIFICANT CHANGE UP (ref 10.3–14.5)
RH IG SCN BLD-IMP: NEGATIVE — SIGNIFICANT CHANGE UP
SODIUM SERPL-SCNC: 139 MMOL/L — SIGNIFICANT CHANGE UP (ref 135–145)
WBC # BLD: 8.74 K/UL — SIGNIFICANT CHANGE UP (ref 3.8–10.5)
WBC # FLD AUTO: 8.74 K/UL — SIGNIFICANT CHANGE UP (ref 3.8–10.5)

## 2020-06-13 PROCEDURE — 99233 SBSQ HOSP IP/OBS HIGH 50: CPT

## 2020-06-13 RX ADMIN — APIXABAN 5 MILLIGRAM(S): 2.5 TABLET, FILM COATED ORAL at 17:04

## 2020-06-13 RX ADMIN — Medication 112 MICROGRAM(S): at 05:58

## 2020-06-13 RX ADMIN — APIXABAN 5 MILLIGRAM(S): 2.5 TABLET, FILM COATED ORAL at 05:54

## 2020-06-13 RX ADMIN — Medication 1: at 17:49

## 2020-06-13 RX ADMIN — ATORVASTATIN CALCIUM 20 MILLIGRAM(S): 80 TABLET, FILM COATED ORAL at 21:57

## 2020-06-13 RX ADMIN — ESCITALOPRAM OXALATE 5 MILLIGRAM(S): 10 TABLET, FILM COATED ORAL at 13:31

## 2020-06-13 RX ADMIN — CEFTRIAXONE 100 MILLIGRAM(S): 500 INJECTION, POWDER, FOR SOLUTION INTRAMUSCULAR; INTRAVENOUS at 13:31

## 2020-06-13 NOTE — PROGRESS NOTE ADULT - SUBJECTIVE AND OBJECTIVE BOX
Patient is a 79y old  Female who presents with a chief complaint of Concern for COVID-19 (12 Jun 2020 17:08)      SUBJECTIVE / OVERNIGHT EVENTS:    No events overnight. This AM, patient without n/v/d/cp/f/c.     MEDICATIONS  (STANDING):  apixaban 5 milliGRAM(s) Oral every 12 hours  atorvastatin 20 milliGRAM(s) Oral at bedtime  cefTRIAXone   IVPB 2000 milliGRAM(s) IV Intermittent every 24 hours  dextrose 5%. 1000 milliLiter(s) (50 mL/Hr) IV Continuous <Continuous>  dextrose 50% Injectable 12.5 Gram(s) IV Push once  dextrose 50% Injectable 25 Gram(s) IV Push once  dextrose 50% Injectable 25 Gram(s) IV Push once  escitalopram 5 milliGRAM(s) Oral daily  insulin lispro (HumaLOG) corrective regimen sliding scale   SubCutaneous three times a day before meals  insulin lispro (HumaLOG) corrective regimen sliding scale   SubCutaneous at bedtime  levothyroxine 112 MICROGram(s) Oral daily    MEDICATIONS  (PRN):  acetaminophen   Tablet .. 650 milliGRAM(s) Oral every 6 hours PRN Temp greater or equal to 38C (100.4F)  ALBUTerol    90 MICROgram(s) HFA Inhaler 2 Puff(s) Inhalation every 6 hours PRN Shortness of Breath and/or Wheezing  dextrose 40% Gel 15 Gram(s) Oral once PRN Blood Glucose LESS THAN 70 milliGRAM(s)/deciliter  glucagon  Injectable 1 milliGRAM(s) IntraMuscular once PRN Glucose LESS THAN 70 milligrams/deciliter      PHYSICAL EXAM:  T(C): 36.5 (06-13-20 @ 11:21), Max: 36.9 (06-12-20 @ 21:21)  HR: 52 (06-13-20 @ 11:21) (50 - 71)  BP: 134/53 (06-13-20 @ 11:21) (134/53 - 152/56)  RR: 19 (06-13-20 @ 11:21) (19 - 20)  SpO2: 98% (06-13-20 @ 11:21) (96% - 98%)  I&O's Summary    12 Jun 2020 07:01  -  13 Jun 2020 07:00  --------------------------------------------------------  IN: 0 mL / OUT: 0 mL / NET: 0 mL    13 Jun 2020 07:01  -  13 Jun 2020 12:34  --------------------------------------------------------  IN: 400 mL / OUT: 0 mL / NET: 400 mL      GENERAL: NAD, well-developed, lying in bed, pleasant  HEAD:  Atraumatic, Normocephalic, MMM  CHEST/LUNG: No use of accessory muscles, speaking in complete sentences, CTAB  COR: RR, no mrcg  PSYCH: AAOx3  NEUROLOGY: CN II-XII grossly intact, moving all extremities  SKIN: No rashes or lesions    LABS:  CAPILLARY BLOOD GLUCOSE      POCT Blood Glucose.: 118 mg/dL (13 Jun 2020 12:00)  POCT Blood Glucose.: 148 mg/dL (13 Jun 2020 08:40)  POCT Blood Glucose.: 149 mg/dL (12 Jun 2020 21:08)  POCT Blood Glucose.: 189 mg/dL (12 Jun 2020 17:10)                          12.8   8.74  )-----------( 220      ( 13 Jun 2020 06:30 )             38.8     06-13    139  |  105  |  12  ----------------------------<  159<H>  4.2   |  21<L>  |  0.61    Ca    9.5      13 Jun 2020 06:30    TPro  7.4  /  Alb  3.8  /  TBili  0.5  /  DBili  x   /  AST  33<H>  /  ALT  69<H>  /  AlkPhos  57  06-13                RADIOLOGY & ADDITIONAL TESTS:    Telemetry Personally Reviewed -     Imaging Personally Reviewed -     Imaging Reviewed -     Consultant(s) Notes Reviewed -       Care Discussed with Consultants/Other Providers -

## 2020-06-13 NOTE — PROGRESS NOTE ADULT - PROBLEM SELECTOR PLAN 2
+fever, HR, and lactate due to E coli bacteremia   -suspect urine source given Ucx with >100K CFU/mL GNR  -Bcx sensitivities returned, c/w 2gm CTX q24h, awaiting negative cultures before transition to oral regimen; clinically improving and afebrile  -repeat BCx in lab

## 2020-06-14 LAB
ALBUMIN SERPL ELPH-MCNC: 3.7 G/DL — SIGNIFICANT CHANGE UP (ref 3.3–5)
ALP SERPL-CCNC: 58 U/L — SIGNIFICANT CHANGE UP (ref 40–120)
ALT FLD-CCNC: 99 U/L — HIGH (ref 4–33)
ANION GAP SERPL CALC-SCNC: 14 MMO/L — SIGNIFICANT CHANGE UP (ref 7–14)
AST SERPL-CCNC: 62 U/L — HIGH (ref 4–32)
BILIRUB SERPL-MCNC: 0.4 MG/DL — SIGNIFICANT CHANGE UP (ref 0.2–1.2)
BLD GP AB SCN SERPL QL: NEGATIVE — SIGNIFICANT CHANGE UP
BUN SERPL-MCNC: 12 MG/DL — SIGNIFICANT CHANGE UP (ref 7–23)
CALCIUM SERPL-MCNC: 9.6 MG/DL — SIGNIFICANT CHANGE UP (ref 8.4–10.5)
CHLORIDE SERPL-SCNC: 104 MMOL/L — SIGNIFICANT CHANGE UP (ref 98–107)
CO2 SERPL-SCNC: 22 MMOL/L — SIGNIFICANT CHANGE UP (ref 22–31)
CREAT SERPL-MCNC: 0.62 MG/DL — SIGNIFICANT CHANGE UP (ref 0.5–1.3)
CULTURE RESULTS: SIGNIFICANT CHANGE UP
GLUCOSE SERPL-MCNC: 135 MG/DL — HIGH (ref 70–99)
HCT VFR BLD CALC: 35.6 % — SIGNIFICANT CHANGE UP (ref 34.5–45)
HGB BLD-MCNC: 12.2 G/DL — SIGNIFICANT CHANGE UP (ref 11.5–15.5)
MCHC RBC-ENTMCNC: 29.3 PG — SIGNIFICANT CHANGE UP (ref 27–34)
MCHC RBC-ENTMCNC: 34.3 % — SIGNIFICANT CHANGE UP (ref 32–36)
MCV RBC AUTO: 85.6 FL — SIGNIFICANT CHANGE UP (ref 80–100)
NRBC # FLD: 0 K/UL — SIGNIFICANT CHANGE UP (ref 0–0)
PLATELET # BLD AUTO: 242 K/UL — SIGNIFICANT CHANGE UP (ref 150–400)
PMV BLD: 10.4 FL — SIGNIFICANT CHANGE UP (ref 7–13)
POTASSIUM SERPL-MCNC: 3.9 MMOL/L — SIGNIFICANT CHANGE UP (ref 3.5–5.3)
POTASSIUM SERPL-SCNC: 3.9 MMOL/L — SIGNIFICANT CHANGE UP (ref 3.5–5.3)
PROT SERPL-MCNC: 6.7 G/DL — SIGNIFICANT CHANGE UP (ref 6–8.3)
RBC # BLD: 4.16 M/UL — SIGNIFICANT CHANGE UP (ref 3.8–5.2)
RBC # FLD: 13 % — SIGNIFICANT CHANGE UP (ref 10.3–14.5)
RH IG SCN BLD-IMP: NEGATIVE — SIGNIFICANT CHANGE UP
SARS-COV-2 RNA SPEC QL NAA+PROBE: SIGNIFICANT CHANGE UP
SODIUM SERPL-SCNC: 140 MMOL/L — SIGNIFICANT CHANGE UP (ref 135–145)
WBC # BLD: 8.01 K/UL — SIGNIFICANT CHANGE UP (ref 3.8–10.5)
WBC # FLD AUTO: 8.01 K/UL — SIGNIFICANT CHANGE UP (ref 3.8–10.5)

## 2020-06-14 PROCEDURE — 99233 SBSQ HOSP IP/OBS HIGH 50: CPT

## 2020-06-14 RX ADMIN — Medication 112 MICROGRAM(S): at 09:03

## 2020-06-14 RX ADMIN — ATORVASTATIN CALCIUM 20 MILLIGRAM(S): 80 TABLET, FILM COATED ORAL at 22:37

## 2020-06-14 RX ADMIN — ESCITALOPRAM OXALATE 5 MILLIGRAM(S): 10 TABLET, FILM COATED ORAL at 11:24

## 2020-06-14 RX ADMIN — Medication 1: at 12:28

## 2020-06-14 RX ADMIN — APIXABAN 5 MILLIGRAM(S): 2.5 TABLET, FILM COATED ORAL at 18:06

## 2020-06-14 RX ADMIN — APIXABAN 5 MILLIGRAM(S): 2.5 TABLET, FILM COATED ORAL at 06:12

## 2020-06-14 RX ADMIN — CEFTRIAXONE 100 MILLIGRAM(S): 500 INJECTION, POWDER, FOR SOLUTION INTRAMUSCULAR; INTRAVENOUS at 12:28

## 2020-06-14 NOTE — PROGRESS NOTE ADULT - ASSESSMENT
80 y/o female PMHX DM, HLD, C. diff?, Hypothyroid, Obesity, REMOTE PE and phlebitis, PREMA not on CPAP, presented with c/o fever (102.3), found to have E. Coli bacteremia and being treated with antibiotics. Noted to have Afib RVR and asymptomatic sinus bradycardia with HR as low as 28 in the setting of sleep apnea, and metoprolol use. Metoprolol on hold now. Currently telemetry reveals sinus rhythm with HR 40s-70s.  - Send COVID test today  - Continue to monitor on telemetry for symptomatic tachy/david arrhythmia  - Continue to hold AV beau blockers  - Continue anticoagulation with apixaban 5mg BID if not contraindicated. 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      Ash Harrlel MD  Cardiology Fellow   [phone]  For all Cardiology service contact information, go to [email or web address] and use "[email or web address]" to login.

## 2020-06-14 NOTE — PROGRESS NOTE ADULT - SUBJECTIVE AND OBJECTIVE BOX
Patient is a 79y old  Female who presents with a chief complaint of Concern for COVID-19 (14 Jun 2020 07:50)      SUBJECTIVE / OVERNIGHT EVENTS:    No events overnight. This AM, patient without n/v/d/cp/sob. Feels well.    MEDICATIONS  (STANDING):  apixaban 5 milliGRAM(s) Oral every 12 hours  atorvastatin 20 milliGRAM(s) Oral at bedtime  cefTRIAXone   IVPB 2000 milliGRAM(s) IV Intermittent every 24 hours  dextrose 5%. 1000 milliLiter(s) (50 mL/Hr) IV Continuous <Continuous>  dextrose 50% Injectable 12.5 Gram(s) IV Push once  dextrose 50% Injectable 25 Gram(s) IV Push once  dextrose 50% Injectable 25 Gram(s) IV Push once  escitalopram 5 milliGRAM(s) Oral daily  insulin lispro (HumaLOG) corrective regimen sliding scale   SubCutaneous three times a day before meals  insulin lispro (HumaLOG) corrective regimen sliding scale   SubCutaneous at bedtime  levothyroxine 112 MICROGram(s) Oral daily    MEDICATIONS  (PRN):  acetaminophen   Tablet .. 650 milliGRAM(s) Oral every 6 hours PRN Temp greater or equal to 38C (100.4F)  ALBUTerol    90 MICROgram(s) HFA Inhaler 2 Puff(s) Inhalation every 6 hours PRN Shortness of Breath and/or Wheezing  dextrose 40% Gel 15 Gram(s) Oral once PRN Blood Glucose LESS THAN 70 milliGRAM(s)/deciliter  glucagon  Injectable 1 milliGRAM(s) IntraMuscular once PRN Glucose LESS THAN 70 milligrams/deciliter      PHYSICAL EXAM:  T(C): 36.6 (06-14-20 @ 14:00), Max: 36.7 (06-13-20 @ 18:42)  HR: 48 (06-14-20 @ 14:00) (48 - 62)  BP: 138/65 (06-14-20 @ 14:00) (138/65 - 159/84)  RR: 20 (06-14-20 @ 14:00) (19 - 20)  SpO2: 98% (06-14-20 @ 14:00) (98% - 99%)  I&O's Summary    13 Jun 2020 07:01  -  14 Jun 2020 07:00  --------------------------------------------------------  IN: 1380 mL / OUT: 600 mL / NET: 780 mL    14 Jun 2020 07:01  -  14 Jun 2020 14:17  --------------------------------------------------------  IN: 400 mL / OUT: 0 mL / NET: 400 mL      GENERAL: NAD, well-developed, lying in bed, pleasant  HEAD:  Atraumatic, Normocephalic, MMM  CHEST/LUNG: No use of accessory muscles, speaking in complete sentences, CTAB  COR: RR, no mrcg  PSYCH: AAOx3  NEUROLOGY: CN II-XII grossly intact, moving all extremities  SKIN: No rashes or lesions      LABS:  CAPILLARY BLOOD GLUCOSE      POCT Blood Glucose.: 162 mg/dL (14 Jun 2020 11:59)  POCT Blood Glucose.: 136 mg/dL (14 Jun 2020 08:30)  POCT Blood Glucose.: 149 mg/dL (13 Jun 2020 21:40)  POCT Blood Glucose.: 195 mg/dL (13 Jun 2020 17:40)                          12.2   8.01  )-----------( 242      ( 14 Jun 2020 06:37 )             35.6     06-14    140  |  104  |  12  ----------------------------<  135<H>  3.9   |  22  |  0.62    Ca    9.6      14 Jun 2020 06:37    TPro  6.7  /  Alb  3.7  /  TBili  0.4  /  DBili  x   /  AST  62<H>  /  ALT  99<H>  /  AlkPhos  58  06-14              Culture - Blood (collected 12 Jun 2020 16:30)  Source: .Blood Blood-Peripheral  Preliminary Report (13 Jun 2020 17:01):    No growth to date.    Culture - Blood (collected 12 Jun 2020 16:30)  Source: .Blood Blood-Venous  Preliminary Report (13 Jun 2020 17:01):    No growth to date.        RADIOLOGY & ADDITIONAL TESTS:    Telemetry Personally Reviewed -     Imaging Personally Reviewed -     Imaging Reviewed -     Consultant(s) Notes Reviewed -   EP - possible PPM placement tomorrow    Care Discussed with Consultants/Other Providers -

## 2020-06-14 NOTE — PROGRESS NOTE ADULT - PROBLEM SELECTOR PLAN 2
+fever, HR, and lactate due to E coli bacteremia   -suspect urine source given Ucx with >100K CFU/mL GNR  -Bcx sensitivities returned, c/w 2gm CTX q24h, awaiting negative cultures before transition to oral regimen; clinically improving and afebrile  -repeat BCx NGTD

## 2020-06-14 NOTE — PROGRESS NOTE ADULT - PROBLEM SELECTOR PLAN 4
New onset afib rvr in setting of sepsis. Now in sinus rhythm. Becomes bradycardic even with low dose metoprolol. CHADSVASC elevated. Counseled patient on risk of stroke, risks/benefits of anticoagulation. She elects anticoagulation as she is fully independent and a stroke would be devastating.   -c/w Eliquis  -EP consulted for pauses, may need leadless PPM placement tomorrow  -TSH wnl  -trops elevated 2/2 demand ischemia in setting of afib rvr, no c/o cp; inconsistent with ACS

## 2020-06-14 NOTE — PROGRESS NOTE ADULT - SUBJECTIVE AND OBJECTIVE BOX
For all Cardiology service contact information, go to [email or web address] and use "[employer]" to login.      Overnight Events: BETHANY. Denies CP, SOB, palpitations.    Medications:  acetaminophen   Tablet .. 650 milliGRAM(s) Oral every 6 hours PRN  ALBUTerol    90 MICROgram(s) HFA Inhaler 2 Puff(s) Inhalation every 6 hours PRN  apixaban 5 milliGRAM(s) Oral every 12 hours  atorvastatin 20 milliGRAM(s) Oral at bedtime  cefTRIAXone   IVPB 2000 milliGRAM(s) IV Intermittent every 24 hours  dextrose 40% Gel 15 Gram(s) Oral once PRN  dextrose 5%. 1000 milliLiter(s) IV Continuous <Continuous>  dextrose 50% Injectable 12.5 Gram(s) IV Push once  dextrose 50% Injectable 25 Gram(s) IV Push once  dextrose 50% Injectable 25 Gram(s) IV Push once  escitalopram 5 milliGRAM(s) Oral daily  glucagon  Injectable 1 milliGRAM(s) IntraMuscular once PRN  insulin lispro (HumaLOG) corrective regimen sliding scale   SubCutaneous three times a day before meals  insulin lispro (HumaLOG) corrective regimen sliding scale   SubCutaneous at bedtime  levothyroxine 112 MICROGram(s) Oral daily      Vitals:  T(F): 98 (06-14), Max: 98 (06-13)  HR: 49 (06-14) (49 - 57)  BP: 149/56 (06-14) (134/53 - 158/63)  RR: 20 (06-14)  SpO2: 98% (06-14)  I&O's Summary    13 Jun 2020 07:01  -  14 Jun 2020 07:00  --------------------------------------------------------  IN: 1380 mL / OUT: 600 mL / NET: 780 mL      Physical Exam:  GENERAL: In no apparent distress, well nourished, and hydrated.  HEART: Regular rate and rhythm; No murmurs, rubs, or gallops.  PULMONARY: Clear to auscultation and percussion.  No rales, wheezing, or rhonchi bilaterally.  ABDOMEN: Soft, Nontender, Nondistended; Bowel sounds present  EXTREMITIES:  2+ Peripheral Pulses, No clubbing, cyanosis, or edema                          12.8   8.74  )-----------( 220      ( 13 Jun 2020 06:30 )             38.8     06-13    139  |  105  |  12  ----------------------------<  159<H>  4.2   |  21<L>  |  0.61    Ca    9.5      13 Jun 2020 06:30    TPro  7.4  /  Alb  3.8  /  TBili  0.5  /  DBili  x   /  AST  33<H>  /  ALT  69<H>  /  AlkPhos  57  06-13

## 2020-06-15 LAB
ALBUMIN SERPL ELPH-MCNC: 3.7 G/DL — SIGNIFICANT CHANGE UP (ref 3.3–5)
ALP SERPL-CCNC: 55 U/L — SIGNIFICANT CHANGE UP (ref 40–120)
ALT FLD-CCNC: 100 U/L — HIGH (ref 4–33)
ANION GAP SERPL CALC-SCNC: 13 MMO/L — SIGNIFICANT CHANGE UP (ref 7–14)
AST SERPL-CCNC: 52 U/L — HIGH (ref 4–32)
BILIRUB SERPL-MCNC: 0.3 MG/DL — SIGNIFICANT CHANGE UP (ref 0.2–1.2)
BUN SERPL-MCNC: 16 MG/DL — SIGNIFICANT CHANGE UP (ref 7–23)
CALCIUM SERPL-MCNC: 9.5 MG/DL — SIGNIFICANT CHANGE UP (ref 8.4–10.5)
CHLORIDE SERPL-SCNC: 103 MMOL/L — SIGNIFICANT CHANGE UP (ref 98–107)
CO2 SERPL-SCNC: 22 MMOL/L — SIGNIFICANT CHANGE UP (ref 22–31)
CREAT SERPL-MCNC: 0.72 MG/DL — SIGNIFICANT CHANGE UP (ref 0.5–1.3)
GLUCOSE SERPL-MCNC: 149 MG/DL — HIGH (ref 70–99)
HCT VFR BLD CALC: 38.3 % — SIGNIFICANT CHANGE UP (ref 34.5–45)
HGB BLD-MCNC: 12.7 G/DL — SIGNIFICANT CHANGE UP (ref 11.5–15.5)
MCHC RBC-ENTMCNC: 28.7 PG — SIGNIFICANT CHANGE UP (ref 27–34)
MCHC RBC-ENTMCNC: 33.2 % — SIGNIFICANT CHANGE UP (ref 32–36)
MCV RBC AUTO: 86.5 FL — SIGNIFICANT CHANGE UP (ref 80–100)
NRBC # FLD: 0 K/UL — SIGNIFICANT CHANGE UP (ref 0–0)
PLATELET # BLD AUTO: 295 K/UL — SIGNIFICANT CHANGE UP (ref 150–400)
PMV BLD: 9.7 FL — SIGNIFICANT CHANGE UP (ref 7–13)
POTASSIUM SERPL-MCNC: 4.1 MMOL/L — SIGNIFICANT CHANGE UP (ref 3.5–5.3)
POTASSIUM SERPL-SCNC: 4.1 MMOL/L — SIGNIFICANT CHANGE UP (ref 3.5–5.3)
PROT SERPL-MCNC: 7 G/DL — SIGNIFICANT CHANGE UP (ref 6–8.3)
RBC # BLD: 4.43 M/UL — SIGNIFICANT CHANGE UP (ref 3.8–5.2)
RBC # FLD: 12.9 % — SIGNIFICANT CHANGE UP (ref 10.3–14.5)
SODIUM SERPL-SCNC: 138 MMOL/L — SIGNIFICANT CHANGE UP (ref 135–145)
WBC # BLD: 8.54 K/UL — SIGNIFICANT CHANGE UP (ref 3.8–10.5)
WBC # FLD AUTO: 8.54 K/UL — SIGNIFICANT CHANGE UP (ref 3.8–10.5)

## 2020-06-15 PROCEDURE — 93010 ELECTROCARDIOGRAM REPORT: CPT | Mod: 77

## 2020-06-15 PROCEDURE — 93010 ELECTROCARDIOGRAM REPORT: CPT

## 2020-06-15 PROCEDURE — 99232 SBSQ HOSP IP/OBS MODERATE 35: CPT

## 2020-06-15 PROCEDURE — 33274 TCAT INSJ/RPL PERM LDLS PM: CPT | Mod: Q0

## 2020-06-15 PROCEDURE — 71045 X-RAY EXAM CHEST 1 VIEW: CPT | Mod: 26

## 2020-06-15 PROCEDURE — 99233 SBSQ HOSP IP/OBS HIGH 50: CPT

## 2020-06-15 RX ORDER — APIXABAN 2.5 MG/1
5 TABLET, FILM COATED ORAL EVERY 12 HOURS
Refills: 0 | Status: DISCONTINUED | OUTPATIENT
Start: 2020-06-16 | End: 2020-06-17

## 2020-06-15 RX ADMIN — ATORVASTATIN CALCIUM 20 MILLIGRAM(S): 80 TABLET, FILM COATED ORAL at 22:37

## 2020-06-15 RX ADMIN — APIXABAN 5 MILLIGRAM(S): 2.5 TABLET, FILM COATED ORAL at 06:34

## 2020-06-15 RX ADMIN — ESCITALOPRAM OXALATE 5 MILLIGRAM(S): 10 TABLET, FILM COATED ORAL at 11:20

## 2020-06-15 RX ADMIN — Medication 112 MICROGRAM(S): at 06:34

## 2020-06-15 RX ADMIN — Medication 650 MILLIGRAM(S): at 11:18

## 2020-06-15 NOTE — PROGRESS NOTE ADULT - SUBJECTIVE AND OBJECTIVE BOX
Follow Up:  E-coli bacteremia    Interval History: pt afebrile, WBC normal and repeat blood cx negative    ROS:      All other systems negative    Constitutional: no fever, no chills  Eyes: no vision changes, no eye pain  ENT:  no sore throat, no rhinorrhea  Cardiovascular:  no chest pain, no palpitation  Respiratory:  no SOB, no cough  GI:  no abd pain, no vomiting, no diarrhea  urinary: no dysuria, no hematuria, no flank pain  musculoskeletal:  no joint pain, no joint swelling  skin:  no rash  neurology:  no headache, no seizure, no change in mental status          Allergies  No Known Allergies        ANTIMICROBIALS:  cefTRIAXone   IVPB 2000 every 24 hours      OTHER MEDS:  acetaminophen   Tablet .. 650 milliGRAM(s) Oral every 6 hours PRN  ALBUTerol    90 MICROgram(s) HFA Inhaler 2 Puff(s) Inhalation every 6 hours PRN  apixaban 5 milliGRAM(s) Oral every 12 hours  atorvastatin 20 milliGRAM(s) Oral at bedtime  dextrose 40% Gel 15 Gram(s) Oral once PRN  dextrose 5%. 1000 milliLiter(s) IV Continuous <Continuous>  dextrose 50% Injectable 12.5 Gram(s) IV Push once  dextrose 50% Injectable 25 Gram(s) IV Push once  dextrose 50% Injectable 25 Gram(s) IV Push once  escitalopram 5 milliGRAM(s) Oral daily  glucagon  Injectable 1 milliGRAM(s) IntraMuscular once PRN  insulin lispro (HumaLOG) corrective regimen sliding scale   SubCutaneous three times a day before meals  insulin lispro (HumaLOG) corrective regimen sliding scale   SubCutaneous at bedtime  levothyroxine 112 MICROGram(s) Oral daily      Vital Signs Last 24 Hrs  T(C): 36.6 (15 Hair 2020 05:55), Max: 36.8 (14 Jun 2020 18:00)  T(F): 97.8 (15 Hair 2020 05:55), Max: 98.2 (14 Jun 2020 18:00)  HR: 48 (15 Hair 2020 05:55) (48 - 57)  BP: 147/57 (15 Hair 2020 05:55) (138/65 - 177/55)  BP(mean): --  RR: 16 (15 Hair 2020 05:55) (16 - 20)  SpO2: 97% (15 Hair 2020 05:55) (97% - 100%)    Physical Exam:  General:    NAD,  non toxic  Head: atraumatic, normocephalic  Eye: normal sclera and conjunctiva  ENT:    no oropharyngeal lesions,   no LAD,   neck supple  Cardio:     regular S1, S2,  no murmur  Respiratory:    clear b/l,    no wheezing  abd:     soft,   BS +,   no tenderness,    no organomegaly  :   no CVAT,  no suprapubic tenderness,   no  ritchie  Musculoskeletal:   no joint swelling,   no edema  vascular: no phlebitis, normal pulses  Skin:    no rash  Neurologic:     no focal deficit  psych: normal affect                          12.7   8.54  )-----------( 295      ( 15 Hair 2020 06:30 )             38.3       06-15    138  |  103  |  16  ----------------------------<  149<H>  4.1   |  22  |  0.72    Ca    9.5      15 Hair 2020 06:30    TPro  7.0  /  Alb  3.7  /  TBili  0.3  /  DBili  x   /  AST  52<H>  /  ALT  100<H>  /  AlkPhos  55  06-15          MICROBIOLOGY:  v  .Blood Blood-Venous  06-12-20   No growth to date.  --  --      .Blood Blood-Peripheral  06-10-20   Growth in aerobic and anaerobic bottles: Escherichia coli  ***Blood Panel PCR results on this specimen are available  approximately 3 hours after the Gram stain result.***  Gram stain, PCR, and/or culture results may not always  correspond due to difference in methodologies.  ************************************************************  This PCR assay was performed using [employer].  The following targets are tested for: Enterococcus,  vancomycin resistant enterococci, Listeria monocytogenes,  coagulase negative staphylococci, S. aureus,  methicillin resistant S. aureus, Streptococcus agalactiae  (Group B), S. pneumoniae, S. pyogenes (Group A),  Acinetobacter baumannii, Enterobacter cloacae, E. coli,  Klebsiella oxytoca, K. pneumoniae, Proteus sp.,  Serratia marcescens, Haemophilus influenzae,  Neisseria meningitidis, Pseudomonas aeruginosa, Candida  albicans, C. glabrata, C krusei, C parapsilosis,  C. tropicalis and the KPC resistance gene.  "Due to technical problems, Proteus sp. will Not be reported as part of  the BCID panel until further notice"  --  Blood Culture PCR  Escherichia coli      .Urine Clean Catch (Midstream)  06-10-20   >100,000 CFU/ml Escherichia coli  --  Escherichia coli      .Blood Blood-Venous  06-10-20   Growth in aerobic and anaerobic bottles: Escherichia coli  See previous culture [id number]  --    Growth in anaerobic bottle: Gram Negative Rods  Growth in aerobic bottle: Gram Negative Rods                RADIOLOGY:  Images below independently visualized and reviewed personally, findings as below  < from: CT Angio Chest w/ IV Cont (06.10.20 @ 18:02) >    IMPRESSION:     1.  No pulmonary embolus.    2.  Clear lungs.        < from: TTE with Doppler (w/Cont) (06.12.20 @ 11:26) >  CONCLUSIONS:  1. Mitral annular calcification, otherwise normal mitral  valve. Minimalmitral regurgitation.  2. Endocardium not well visualized; grossly normal left  ventricular systolic function. Endocardial visualization  enhanced with intravenous injection of echo contrast  (Definity).  3. The right ventricle is not well visualized; grossly  normal right ventricular systolic function.

## 2020-06-15 NOTE — CHART NOTE - NSCHARTNOTEFT_GEN_A_CORE
Spoke with Dr. Machado and patient is s/p MICRA PPM implant. Patient received Ancef 2grams prior to the procedure and Dr. Machado recommended to continue with IV Ceftriaxone(patient already on it for E-coli bacteremia) instead of Ancef. Patient is to re-start Eliquis back again tomorrow if right groin site stable.

## 2020-06-15 NOTE — CHART NOTE - NSCHARTNOTEFT_GEN_A_CORE
Patient s/p MICRA PPM implant, Rt groin appears clean, dry, intact, no evidence of active bleeding, no hematoma, + pulses. Continue to monitor.

## 2020-06-15 NOTE — PROGRESS NOTE ADULT - ASSESSMENT
79 f with DM, hypothyroidism, diverticulitis and multiple bowel resections p/w chills, body pain and no other symptoms  here febrile to 102.8 normal WBC  u/a with 11-25 WBC, urine and blood cx: E-coli (pan S)  abd/pelvis CT negative, chest CTA: no PE  COVID negative  pt found to have a-fib with RVR and then david now plan for PPM    fever, tachycardia, sepsis  E-coli bacteremia likely due to UTI but no urinary symptoms and no other focus of infection  tachy-david plan for PPM    * pt afebrile, normal WBC and repeat blood cx 6/12 negative, no absolute contraindications from ID point of view for PPM placement  * c/w ceftriaxone 2 q d, now day 4 post negative blood cx  * on discharge will switch to a PO regimen        The above assessment and plan was discussed with the primary team    Jolynn Aguilar MD  Pager [phone]  After 5pm and on weekends call [phone]

## 2020-06-15 NOTE — PROGRESS NOTE ADULT - PROBLEM SELECTOR PLAN 3
+fever, HR, and lactate due to E coli bacteremia   -suspect urine source given Ucx with >100K CFU/mL GNR  -Bcx sensitivities returned, c/w 2gm CTX q24h, repeat CX NGTD, will likely transition to po dc; clinically improving and afebrile

## 2020-06-15 NOTE — PROGRESS NOTE ADULT - SUBJECTIVE AND OBJECTIVE BOX
Patient is seen and examined. Denies any chest pain, SOB, palpitations or dizziness. NPO p MN for possible pPM implantation today. Patient consented for the procedure after understanding the risks/benefits and alternatives of the procedure. Awaiting ID clearance. Assessment: please see EP preprocedure assessment record

## 2020-06-15 NOTE — PROVIDER CONTACT NOTE (OTHER) - BACKGROUND
Doretha Erwin 79 year old female admitted on 6/10/2020 for sepsis. Patient pending pacemaker placement 6/15/2020.

## 2020-06-15 NOTE — PROGRESS NOTE ADULT - PROBLEM SELECTOR PLAN 1
New onset afib rvr in setting of sepsis. 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 Plan for PPM placement 6/15/20, ID cleared pt  Now in sinus rhythm. CHADSVASC elevated. Counseled patient on risk of stroke, risks/benefits of anticoagulation. She elects anticoagulation as she is fully independent and a stroke would be devastating.   -c/w Eliquis  -TSH wnl  -trops elevated 2/2 demand ischemia in setting of afib rvr, no c/o cp; inconsistent with ACS

## 2020-06-16 LAB
ALBUMIN SERPL ELPH-MCNC: 4 G/DL — SIGNIFICANT CHANGE UP (ref 3.3–5)
ALBUMIN SERPL ELPH-MCNC: 4 G/DL — SIGNIFICANT CHANGE UP (ref 3.3–5)
ALP SERPL-CCNC: 58 U/L — SIGNIFICANT CHANGE UP (ref 40–120)
ALP SERPL-CCNC: 58 U/L — SIGNIFICANT CHANGE UP (ref 40–120)
ALT FLD-CCNC: 87 U/L — HIGH (ref 4–33)
ALT FLD-CCNC: 87 U/L — HIGH (ref 4–33)
ANION GAP SERPL CALC-SCNC: 13 MMO/L — SIGNIFICANT CHANGE UP (ref 7–14)
ANION GAP SERPL CALC-SCNC: 13 MMO/L — SIGNIFICANT CHANGE UP (ref 7–14)
AST SERPL-CCNC: 42 U/L — HIGH (ref 4–32)
AST SERPL-CCNC: 42 U/L — HIGH (ref 4–32)
BILIRUB SERPL-MCNC: 0.3 MG/DL — SIGNIFICANT CHANGE UP (ref 0.2–1.2)
BILIRUB SERPL-MCNC: 0.3 MG/DL — SIGNIFICANT CHANGE UP (ref 0.2–1.2)
BUN SERPL-MCNC: 18 MG/DL — SIGNIFICANT CHANGE UP (ref 7–23)
BUN SERPL-MCNC: 18 MG/DL — SIGNIFICANT CHANGE UP (ref 7–23)
CALCIUM SERPL-MCNC: 9.8 MG/DL — SIGNIFICANT CHANGE UP (ref 8.4–10.5)
CALCIUM SERPL-MCNC: 9.8 MG/DL — SIGNIFICANT CHANGE UP (ref 8.4–10.5)
CHLORIDE SERPL-SCNC: 102 MMOL/L — SIGNIFICANT CHANGE UP (ref 98–107)
CHLORIDE SERPL-SCNC: 102 MMOL/L — SIGNIFICANT CHANGE UP (ref 98–107)
CO2 SERPL-SCNC: 25 MMOL/L — SIGNIFICANT CHANGE UP (ref 22–31)
CO2 SERPL-SCNC: 25 MMOL/L — SIGNIFICANT CHANGE UP (ref 22–31)
CREAT SERPL-MCNC: 0.64 MG/DL — SIGNIFICANT CHANGE UP (ref 0.5–1.3)
CREAT SERPL-MCNC: 0.64 MG/DL — SIGNIFICANT CHANGE UP (ref 0.5–1.3)
GLUCOSE SERPL-MCNC: 146 MG/DL — HIGH (ref 70–99)
GLUCOSE SERPL-MCNC: 146 MG/DL — HIGH (ref 70–99)
HCT VFR BLD CALC: 41.5 % — SIGNIFICANT CHANGE UP (ref 34.5–45)
HGB BLD-MCNC: 13.9 G/DL — SIGNIFICANT CHANGE UP (ref 11.5–15.5)
MAGNESIUM SERPL-MCNC: 2.4 MG/DL — SIGNIFICANT CHANGE UP (ref 1.6–2.6)
MCHC RBC-ENTMCNC: 28.7 PG — SIGNIFICANT CHANGE UP (ref 27–34)
MCHC RBC-ENTMCNC: 33.5 % — SIGNIFICANT CHANGE UP (ref 32–36)
MCV RBC AUTO: 85.6 FL — SIGNIFICANT CHANGE UP (ref 80–100)
NRBC # FLD: 0 K/UL — SIGNIFICANT CHANGE UP (ref 0–0)
PHOSPHATE SERPL-MCNC: 3.5 MG/DL — SIGNIFICANT CHANGE UP (ref 2.5–4.5)
PLATELET # BLD AUTO: 389 K/UL — SIGNIFICANT CHANGE UP (ref 150–400)
PMV BLD: 10 FL — SIGNIFICANT CHANGE UP (ref 7–13)
POTASSIUM SERPL-MCNC: 4.2 MMOL/L — SIGNIFICANT CHANGE UP (ref 3.5–5.3)
POTASSIUM SERPL-MCNC: 4.2 MMOL/L — SIGNIFICANT CHANGE UP (ref 3.5–5.3)
POTASSIUM SERPL-SCNC: 4.2 MMOL/L — SIGNIFICANT CHANGE UP (ref 3.5–5.3)
POTASSIUM SERPL-SCNC: 4.2 MMOL/L — SIGNIFICANT CHANGE UP (ref 3.5–5.3)
PROT SERPL-MCNC: 7.4 G/DL — SIGNIFICANT CHANGE UP (ref 6–8.3)
PROT SERPL-MCNC: 7.4 G/DL — SIGNIFICANT CHANGE UP (ref 6–8.3)
RBC # BLD: 4.85 M/UL — SIGNIFICANT CHANGE UP (ref 3.8–5.2)
RBC # FLD: 12.7 % — SIGNIFICANT CHANGE UP (ref 10.3–14.5)
SODIUM SERPL-SCNC: 140 MMOL/L — SIGNIFICANT CHANGE UP (ref 135–145)
SODIUM SERPL-SCNC: 140 MMOL/L — SIGNIFICANT CHANGE UP (ref 135–145)
WBC # BLD: 11.65 K/UL — HIGH (ref 3.8–10.5)
WBC # FLD AUTO: 11.65 K/UL — HIGH (ref 3.8–10.5)

## 2020-06-16 PROCEDURE — 99232 SBSQ HOSP IP/OBS MODERATE 35: CPT

## 2020-06-16 RX ORDER — METOPROLOL TARTRATE 50 MG
12.5 TABLET ORAL
Refills: 0 | Status: DISCONTINUED | OUTPATIENT
Start: 2020-06-16 | End: 2020-06-17

## 2020-06-16 RX ADMIN — CEFTRIAXONE 100 MILLIGRAM(S): 500 INJECTION, POWDER, FOR SOLUTION INTRAMUSCULAR; INTRAVENOUS at 12:24

## 2020-06-16 RX ADMIN — APIXABAN 5 MILLIGRAM(S): 2.5 TABLET, FILM COATED ORAL at 17:17

## 2020-06-16 RX ADMIN — Medication 112 MICROGRAM(S): at 05:56

## 2020-06-16 RX ADMIN — ESCITALOPRAM OXALATE 5 MILLIGRAM(S): 10 TABLET, FILM COATED ORAL at 11:57

## 2020-06-16 RX ADMIN — ATORVASTATIN CALCIUM 20 MILLIGRAM(S): 80 TABLET, FILM COATED ORAL at 21:30

## 2020-06-16 RX ADMIN — APIXABAN 5 MILLIGRAM(S): 2.5 TABLET, FILM COATED ORAL at 05:56

## 2020-06-16 RX ADMIN — Medication 1: at 11:57

## 2020-06-16 NOTE — PROGRESS NOTE ADULT - SUBJECTIVE AND OBJECTIVE BOX
Follow Up:  E-coli bacteremia    Interval History: s/p micra placement yesterday    ROS:      All other systems negative    Constitutional: no fever, no chills  Eyes: no vision changes, no eye pain  ENT:  no sore throat, no rhinorrhea  Cardiovascular:  no chest pain, no palpitation  Respiratory:  no SOB, no cough  GI:  no abd pain, no vomiting, no diarrhea  urinary: no dysuria, no hematuria, no flank pain  musculoskeletal:  no joint pain, no joint swelling  skin:  no rash  neurology:  no headache, no seizure, no change in mental status        Allergies  No Known Allergies        ANTIMICROBIALS:  cefTRIAXone   IVPB 2000 every 24 hours      OTHER MEDS:  acetaminophen   Tablet .. 650 milliGRAM(s) Oral every 6 hours PRN  ALBUTerol    90 MICROgram(s) HFA Inhaler 2 Puff(s) Inhalation every 6 hours PRN  apixaban 5 milliGRAM(s) Oral every 12 hours  atorvastatin 20 milliGRAM(s) Oral at bedtime  dextrose 40% Gel 15 Gram(s) Oral once PRN  dextrose 5%. 1000 milliLiter(s) IV Continuous <Continuous>  dextrose 50% Injectable 12.5 Gram(s) IV Push once  dextrose 50% Injectable 25 Gram(s) IV Push once  dextrose 50% Injectable 25 Gram(s) IV Push once  escitalopram 5 milliGRAM(s) Oral daily  glucagon  Injectable 1 milliGRAM(s) IntraMuscular once PRN  insulin lispro (HumaLOG) corrective regimen sliding scale   SubCutaneous three times a day before meals  insulin lispro (HumaLOG) corrective regimen sliding scale   SubCutaneous at bedtime  levothyroxine 112 MICROGram(s) Oral daily      Vital Signs Last 24 Hrs  T(C): 36.8 (16 Jun 2020 05:55), Max: 36.8 (16 Jun 2020 05:55)  T(F): 98.2 (16 Jun 2020 05:55), Max: 98.2 (16 Jun 2020 05:55)  HR: 84 (16 Jun 2020 05:55) (63 - 84)  BP: 135/66 (16 Jun 2020 05:55) (135/66 - 158/75)  BP(mean): --  RR: 18 (16 Jun 2020 05:55) (18 - 18)  SpO2: 98% (16 Jun 2020 05:55) (96% - 98%)    Physical Exam:  General:    NAD,  non toxic  Head: atraumatic, normocephalic  Eye: normal sclera and conjunctiva  ENT:    no oropharyngeal lesions,   no LAD,   neck supple  Cardio:     regular S1, S2,  no murmur  Respiratory:    clear b/l,    no wheezing  abd:     soft,   BS +,   no tenderness,    no organomegaly  :   no CVAT,  no suprapubic tenderness,   no  ritchie  Musculoskeletal:   no joint swelling,   no edema  vascular: no phlebitis, normal pulses  Skin:    no rash  Neurologic:     no focal deficit  psych: normal affect                          13.9   11.65 )-----------( 389      ( 16 Jun 2020 07:25 )             41.5       06-16    140  |  102  |  18  ----------------------------<  146<H>  4.2   |  25  |  0.64    Ca    9.8      16 Jun 2020 07:25  Phos  3.5     06-16  Mg     2.4     06-16    TPro  7.4  /  Alb  4.0  /  TBili  0.3  /  DBili  x   /  AST  42<H>  /  ALT  87<H>  /  AlkPhos  58  06-16          MICROBIOLOGY:  v  .Blood Blood-Venous  06-12-20   No growth to date.  --  --      .Blood Blood-Peripheral  06-10-20   Growth in aerobic and anaerobic bottles: Escherichia coli  ***Blood Panel PCR results on this specimen are available  approximately 3 hours after the Gram stain result.***  Gram stain, PCR, and/or culture results may not always  correspond due to difference in methodologies.  ************************************************************  This PCR assay was performed using [employer].  The following targets are tested for: Enterococcus,  vancomycin resistant enterococci, Listeria monocytogenes,      .Urine Clean Catch (Midstream)  06-10-20   >100,000 CFU/ml Escherichia coli  --  Escherichia coli      .Blood Blood-Venous  06-10-20   Growth in aerobic and anaerobic bottles: Escherichia coli  See previous culture [id number]  --    Growth in anaerobic bottle: Gram Negative Rods  Growth in aerobic bottle: Gram Negative Rods                RADIOLOGY:  Images below independently visualized and reviewed personally, findings as below  < from: Xray Chest 1 View- PORTABLE-Urgent (06.15.20 @ 18:32) >    IMPRESSION:  No pneumothorax following Micra device placement.      < from: CT Angio Chest w/ IV Cont (06.10.20 @ 18:02) >  IMPRESSION:     1.  No pulmonary embolus.    2.  Clear lungs.      < from: TTE with Doppler (w/Cont) (06.12.20 @ 11:26) >  CONCLUSIONS:  1. Mitral annular calcification, otherwise normal mitral  valve. Minimalmitral regurgitation.  2. Endocardium not well visualized; grossly normal left  ventricular systolic function. Endocardial visualization  enhanced with intravenous injection of echo contrast  (Definity).  3. The right ventricle is not well visualized; grossly  normal right ventricular systolic function.

## 2020-06-16 NOTE — PROGRESS NOTE ADULT - PROBLEM SELECTOR PLAN 2
New onset afib rvr in setting of sepsis. 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 s/p micra placement 6/15/20, c/w monitoring  Now in sinus rhythm. CHADSVASC elevated.   -c/w Eliquis for anticoagulation  -TSH wnl

## 2020-06-16 NOTE — PROGRESS NOTE ADULT - SUBJECTIVE AND OBJECTIVE BOX
[facility] Division of Hospital Medicine  Efe Warren MD  Pager [phone]      Patient is a 79y old  Female who presents with a chief complaint of Concern for COVID-19 (16 Jun 2020 10:05)      SUBJECTIVE / OVERNIGHT EVENTS: Denies new CP or SOB. No fever or chills.    MEDICATIONS  (STANDING):  apixaban 5 milliGRAM(s) Oral every 12 hours  atorvastatin 20 milliGRAM(s) Oral at bedtime  cefTRIAXone   IVPB 2000 milliGRAM(s) IV Intermittent every 24 hours  dextrose 5%. 1000 milliLiter(s) (50 mL/Hr) IV Continuous <Continuous>  dextrose 50% Injectable 12.5 Gram(s) IV Push once  dextrose 50% Injectable 25 Gram(s) IV Push once  dextrose 50% Injectable 25 Gram(s) IV Push once  escitalopram 5 milliGRAM(s) Oral daily  insulin lispro (HumaLOG) corrective regimen sliding scale   SubCutaneous three times a day before meals  insulin lispro (HumaLOG) corrective regimen sliding scale   SubCutaneous at bedtime  levothyroxine 112 MICROGram(s) Oral daily    MEDICATIONS  (PRN):  acetaminophen   Tablet .. 650 milliGRAM(s) Oral every 6 hours PRN Temp greater or equal to 38C (100.4F)  ALBUTerol    90 MICROgram(s) HFA Inhaler 2 Puff(s) Inhalation every 6 hours PRN Shortness of Breath and/or Wheezing  dextrose 40% Gel 15 Gram(s) Oral once PRN Blood Glucose LESS THAN 70 milliGRAM(s)/deciliter  glucagon  Injectable 1 milliGRAM(s) IntraMuscular once PRN Glucose LESS THAN 70 milligrams/deciliter      CAPILLARY BLOOD GLUCOSE      POCT Blood Glucose.: 184 mg/dL (16 Jun 2020 11:38)  POCT Blood Glucose.: 145 mg/dL (16 Jun 2020 07:52)  POCT Blood Glucose.: 222 mg/dL (15 Hair 2020 22:32)  POCT Blood Glucose.: 106 mg/dL (15 Hair 2020 17:47)  POCT Blood Glucose.: 108 mg/dL (15 Hair 2020 15:11)  POCT Blood Glucose.: 123 mg/dL (15 Hair 2020 12:17)    I&O's Summary    15 Hair 2020 07:01  -  16 Jun 2020 07:00  --------------------------------------------------------  IN: 0 mL / OUT: 450 mL / NET: -450 mL        PHYSICAL EXAM:  Vital Signs Last 24 Hrs  T(C): 36.8 (16 Jun 2020 05:55), Max: 36.8 (16 Jun 2020 05:55)  T(F): 98.2 (16 Jun 2020 05:55), Max: 98.2 (16 Jun 2020 05:55)  HR: 84 (16 Jun 2020 05:55) (63 - 84)  BP: 135/66 (16 Jun 2020 05:55) (135/66 - 158/75)  BP(mean): --  RR: 18 (16 Jun 2020 05:55) (18 - 18)  SpO2: 98% (16 Jun 2020 05:55) (96% - 98%)    CONSTITUTIONAL: NAD  EYES: conjunctiva and sclera clear  ENMT: mmm  NECK: Supple,  RESPIRATORY: Normal respiratory effort; lungs are clear to auscultation bilaterally  CARDIOVASCULAR: Regular rate and rhythm, + S1 and S2  ABDOMEN: Nontender to palpation, normoactive bowel sounds, no rebound/guarding  MUSCULOSKELETAL:  no clubbing or cyanosis of digits;   PSYCH: A+O x3      LABS:                        13.9   11.65 )-----------( 389      ( 16 Jun 2020 07:25 )             41.5     06-16    140  |  102  |  18  ----------------------------<  146<H>  4.2   |  25  |  0.64    Ca    9.8      16 Jun 2020 07:25  Phos  3.5     06-16  Mg     2.4     06-16    TPro  7.4  /  Alb  4.0  /  TBili  0.3  /  DBili  x   /  AST  42<H>  /  ALT  87<H>  /  AlkPhos  58  06-16

## 2020-06-16 NOTE — PROGRESS NOTE ADULT - SUBJECTIVE AND OBJECTIVE BOX
Patient is seen and examined. Denies any chest pain, SOB, palpitations or dizziness. s/p Micra implant yesterday    PAST MEDICAL & SURGICAL HISTORY:  DM (diabetes mellitus)  Spinal stenosis of lumbar region  Sleep Apnea  Meniscus tear lateral left knee  h/o Prolapsed Uterus  h/o Pulmonary Embolus 30 yrs ago  h/o Phlebitis 30 yrs ago  h/o Cholecystitis  Hiatal Hernia  Hyperlipidemia  Arthritis  Diverticulitis  Adult Hypothyroidism  Right hand surgery 1999 , h/o arthritis  Left hand surgery 2004: h/o arthritis hand  h/o cholecystectomy  2010  S/P Hysterectomy  S/P Appendectomy  S/P Colon Resection      MEDICATIONS  (STANDING):  apixaban 5 milliGRAM(s) Oral every 12 hours  atorvastatin 20 milliGRAM(s) Oral at bedtime  cefTRIAXone   IVPB 2000 milliGRAM(s) IV Intermittent every 24 hours  dextrose 5%. 1000 milliLiter(s) (50 mL/Hr) IV Continuous <Continuous>  dextrose 50% Injectable 12.5 Gram(s) IV Push once  dextrose 50% Injectable 25 Gram(s) IV Push once  dextrose 50% Injectable 25 Gram(s) IV Push once  escitalopram 5 milliGRAM(s) Oral daily  insulin lispro (HumaLOG) corrective regimen sliding scale   SubCutaneous three times a day before meals  insulin lispro (HumaLOG) corrective regimen sliding scale   SubCutaneous at bedtime  levothyroxine 112 MICROGram(s) Oral daily    MEDICATIONS  (PRN):  acetaminophen   Tablet .. 650 milliGRAM(s) Oral every 6 hours PRN Temp greater or equal to 38C (100.4F)  ALBUTerol    90 MICROgram(s) HFA Inhaler 2 Puff(s) Inhalation every 6 hours PRN Shortness of Breath and/or Wheezing  dextrose 40% Gel 15 Gram(s) Oral once PRN Blood Glucose LESS THAN 70 milliGRAM(s)/deciliter  glucagon  Injectable 1 milliGRAM(s) IntraMuscular once PRN Glucose LESS THAN 70 milligrams/deciliter      Vital Signs Last 24 Hrs  T(C): 36.8 (16 Jun 2020 05:55), Max: 36.8 (16 Jun 2020 05:55)  T(F): 98.2 (16 Jun 2020 05:55), Max: 98.2 (16 Jun 2020 05:55)  HR: 84 (16 Jun 2020 05:55) (63 - 84)  BP: 135/66 (16 Jun 2020 05:55) (135/66 - 158/75)  BP(mean): --  RR: 18 (16 Jun 2020 05:55) (18 - 18)  SpO2: 98% (16 Jun 2020 05:55) (96% - 98%)    INTERPRETATION OF TELEMETRY: Sinus rhythm and V pacing @ 60s    LABS:                        13.9   11.65 )-----------( 389      ( 16 Jun 2020 07:25 )             41.5     06-16    140  |  102  |  18  ----------------------------<  146<H>  4.2   |  25  |  0.64    Ca    9.8      16 Jun 2020 07:25  Phos  3.5     06-16  Mg     2.4     06-16    TPro  7.4  /  Alb  4.0  /  TBili  0.3  /  DBili  x   /  AST  42<H>  /  ALT  87<H>  /  AlkPhos  58  06-16      I&O's Summary    15 Hair 2020 07:01  -  16 Jun 2020 07:00  --------------------------------------------------------  IN: 0 mL / OUT: 450 mL / NET: -450 mL        PHYSICAL EXAM:    GENERAL: In no apparent distress, well nourished, and hydrated.  HEART: Regular rate and rhythm; No murmurs, rubs, or gallops.  PULMONARY: Clear to auscultation and percussion.  No rales, wheezing, or rhonchi bilaterally.  ABDOMEN: Soft, Nontender, Nondistended; Bowel sounds present  EXTREMITIES:  2+ Peripheral Pulses, No clubbing, cyanosis, or edema

## 2020-06-16 NOTE — PROVIDER CONTACT NOTE (OTHER) - ACTION/TREATMENT ORDERED:
Continue to monitor. Notify if bradycardia sustains
WARREN Hartman aware. Recommend to hold metoprolol and reassess vitals. medication to be held
kai Boyer aware. Agreed to hold metoprolol. Will continue to monitor patient

## 2020-06-16 NOTE — PROGRESS NOTE ADULT - PROBLEM SELECTOR PLAN 1
due to E coli bacteremia, WBC trended down initially but uptrended s/p micra placement, ID wants to Trend WBC in the AM. Will likely dc in the AM if WBC improves  -suspect urine source given Ucx with >100K CFU/mL GNR  -Bcx sensitivities returned, c/w 2gm CTX q24h, repeat CX NGTD, will likely transition to po dc; clinically improving and afebrile

## 2020-06-16 NOTE — PROGRESS NOTE ADULT - ASSESSMENT
80 y/o female PMHX DM, HLD, C. diff?, Hypothyroid, Obesity, REMOTE PE and phlebitis, PREMA not on CPAP, presented with c/o fever (102.3), found to have E. Coli bacteremia and being treated with antibiotics. Noted to have Afib RVR and asymptomatic sinus bradycardia with HR as low as 28 in the setting of sleep apnea, and metoprolol use. Metoprolol on hold now. Patient is s/p leadless PM(Micra) implantation yesterday. Right groin suture removed. Groin site clean, dry and intact with no bleeding or hematoma. Post procedure instructions given to patient and she demonstrates understanding of the instructions. Device interrogation normal.     - May restart beta blocker(pt had afib with RVR)  - Continue anticoagulation with apixaban 5mg BID  - CPAP during sleep if possible as patient has known history of sleep apnea  - F/U appointment with device clinic on 6/29/20 @ 8:15 am

## 2020-06-16 NOTE — PROGRESS NOTE ADULT - ASSESSMENT
79 f with DM, hypothyroidism, diverticulitis and multiple bowel resections p/w chills, body pain and no other symptoms  here febrile to 102.8 normal WBC  u/a with 11-25 WBC, urine and blood cx: E-coli (pan S)  abd/pelvis CT negative, chest CTA: no PE  COVID negative  pt found to have a-fib with RVR and then david s/p micra 6/15    fever, tachycardia, sepsis  E-coli bacteremia likely due to UTI but no urinary symptoms and no other focus of infection, repeat negative 6/12  tachy-david   new leukocytosis like reactive post micra placement    * s/p micra 6/15  * repeat the CBC tomorow  * c/w ceftriaxone 2 q d, now day 5 post negative blood cx  * on discharge will switch to a PO cefuroxime 500 q 12 to complete a 10 day course until 6/21        The above assessment and plan was discussed with the primary team    Jolynn Aguilar MD  Pager [phone]  After 5pm and on weekends call [phone]

## 2020-06-17 ENCOUNTER — TRANSCRIPTION ENCOUNTER (OUTPATIENT)
Age: 80
End: 2020-06-17

## 2020-06-17 VITALS — WEIGHT: 126.32 LBS

## 2020-06-17 LAB
CULTURE RESULTS: SIGNIFICANT CHANGE UP
CULTURE RESULTS: SIGNIFICANT CHANGE UP
HCT VFR BLD CALC: 38.9 % — SIGNIFICANT CHANGE UP (ref 34.5–45)
HGB BLD-MCNC: 13.2 G/DL — SIGNIFICANT CHANGE UP (ref 11.5–15.5)
MCHC RBC-ENTMCNC: 29.6 PG — SIGNIFICANT CHANGE UP (ref 27–34)
MCHC RBC-ENTMCNC: 33.9 % — SIGNIFICANT CHANGE UP (ref 32–36)
MCV RBC AUTO: 87.2 FL — SIGNIFICANT CHANGE UP (ref 80–100)
NRBC # FLD: 0 K/UL — SIGNIFICANT CHANGE UP (ref 0–0)
PLATELET # BLD AUTO: 422 K/UL — HIGH (ref 150–400)
PMV BLD: 10.1 FL — SIGNIFICANT CHANGE UP (ref 7–13)
RBC # BLD: 4.46 M/UL — SIGNIFICANT CHANGE UP (ref 3.8–5.2)
RBC # FLD: 13.1 % — SIGNIFICANT CHANGE UP (ref 10.3–14.5)
SPECIMEN SOURCE: SIGNIFICANT CHANGE UP
SPECIMEN SOURCE: SIGNIFICANT CHANGE UP
WBC # BLD: 10.84 K/UL — HIGH (ref 3.8–10.5)
WBC # FLD AUTO: 10.84 K/UL — HIGH (ref 3.8–10.5)

## 2020-06-17 PROCEDURE — 99239 HOSP IP/OBS DSCHRG MGMT >30: CPT

## 2020-06-17 PROCEDURE — 99232 SBSQ HOSP IP/OBS MODERATE 35: CPT

## 2020-06-17 RX ORDER — METOPROLOL TARTRATE 50 MG
0.5 TABLET ORAL
Qty: 30 | Refills: 0
Start: 2020-06-17 | End: 2020-07-16

## 2020-06-17 RX ORDER — METOPROLOL TARTRATE 50 MG
1 TABLET ORAL
Qty: 0 | Refills: 0 | DISCHARGE

## 2020-06-17 RX ORDER — APIXABAN 2.5 MG/1
1 TABLET, FILM COATED ORAL
Qty: 60 | Refills: 0
Start: 2020-06-17 | End: 2020-07-16

## 2020-06-17 RX ORDER — CEFUROXIME AXETIL 250 MG
1 TABLET ORAL
Qty: 9 | Refills: 0
Start: 2020-06-17 | End: 2020-06-20

## 2020-06-17 RX ORDER — RIVAROXABAN 15 MG-20MG
1 KIT ORAL
Qty: 30 | Refills: 0
Start: 2020-06-17 | End: 2020-07-16

## 2020-06-17 RX ADMIN — CEFTRIAXONE 100 MILLIGRAM(S): 500 INJECTION, POWDER, FOR SOLUTION INTRAMUSCULAR; INTRAVENOUS at 13:40

## 2020-06-17 RX ADMIN — Medication 112 MICROGRAM(S): at 05:24

## 2020-06-17 RX ADMIN — ESCITALOPRAM OXALATE 5 MILLIGRAM(S): 10 TABLET, FILM COATED ORAL at 11:57

## 2020-06-17 RX ADMIN — APIXABAN 5 MILLIGRAM(S): 2.5 TABLET, FILM COATED ORAL at 05:24

## 2020-06-17 NOTE — PHYSICAL THERAPY INITIAL EVALUATION ADULT - ADDITIONAL COMMENTS
Patient's heartrate was erratic immediately after ambulation, varying between 88 to 129, in the matter of seconds. BARBARA Wheat notified. Patient left in supine with heartrate resting at 88.

## 2020-06-17 NOTE — DIETITIAN INITIAL EVALUATION ADULT. - OTHER INFO
80 y/o F with DM2, obesity, diverticulitis r/o COVID with new afib and s/p PPM placement 6/15. Pt reports good PO intake. Denies any GI events. No chewing or swallowing difficulties at this time.     Pt's primary care physician told her she has prediabetes. She has been taking metformin and Januvia for ~6 months. Pt has been preparing balanced meals and controlling carbohydrate portions. A1c 5.9. UBW 170s. Current weight 194 pounds; attributes wt gain to inactivity during stay-at home order past 3 months.

## 2020-06-17 NOTE — DISCHARGE NOTE PROVIDER - HOSPITAL COURSE
78 yo F with a PMHx of T2DM, OA, hypothyroid, obesity, remote h/o PE, diverticulitis s/p multiple abdominal surgeries, hiatal hernia, PREMA, lumbar spinal stenosis, HLD who p/w fever (Tm 102.3), body ache, chills and poor appetite due to E coli bacteremia. Course c/b new afib with RVR.         Problem/Plan - 1:    ·  Problem: Severe sepsis.  Plan: due to E coli bacteremia, WBC trended down initially but uptrended s/p micra placement, ID wants to Trend WBC in the AM. Will likely dc in the AM if WBC improves    -suspect urine source given Ucx with >100K CFU/mL GNR    -Bcx sensitivities returned, c/w 2gm CTX q24h, repeat CX NGTD, will likely transition to po dc; clinically improving and afebrile.          Problem/Plan - 2:    ·  Problem: Afib.  Plan: New onset afib rvr in setting of sepsis. 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 s/p micra placement 6/15/20, c/w monitoring    Now in sinus rhythm. CHADSVASC elevated.     -c/w Eliquis for anticoagulation    -TSH wnl.          Problem/Plan - 3:    ·  Problem: Respiratory failure with hypoxia.  Plan: -Pt noted to be hypoxic with increased work of breathing s/p ambulation on admission. CXR and CTA chest clear; no e/o PE. Not requiring oxygen. No further dyspnea.          Problem/Plan - 4:    ·  Problem: R/O 2019 novel coronavirus disease (COVID-19).  Plan: COVID negative. Found to have E coli bacteremia. Ruled out.          Problem/Plan - 5:    ·  Problem: DM (diabetes mellitus).  Plan: -A1C at goal    -FSG TID/AC    -MARS.          Problem/Plan - 6:    Problem: Sleep Apnea. Plan: Not on cpap.         Problem/Plan - 7:    ·  Problem: Hyperlipidemia.  Plan: Atorvastatin as therapeutic interchange for Livalo.          Problem/Plan - 8:    ·  Problem: Adult Hypothyroidism.  Plan: -TSH wnl    -c/w home dose levothyroxine; verified with outpatient pharmacy.          Problem/Plan - 9:    ·  Problem: Osteoarthritis.  Plan: Avoid NSAIDs given use of anticoagulation.                 Discharge Medication reconciliation and follow up reviewed with Medical Attending and confirmed before discharge on 6/17 78 yo F with a PMHx of T2DM, OA, hypothyroid, obesity, remote h/o PE, diverticulitis s/p multiple abdominal surgeries, hiatal hernia, PREMA, lumbar spinal stenosis, HLD who p/w fever (Tm 102.3), body ache, chills and poor appetite due to E coli bacteremia. Course c/b new afib with RVR.             Severe sepsis: due to UTI and E coli bacteremia, pt was treated with ceftriaxone per ID which was transitioned to cefuroxime per ID.     .        Afib.  Plan: New onset afib rvr in setting of sepsis. 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 s/p micra placement 6/15/20, c/w monitoring    Now in sinus rhythm. CHADSVASC elevated. c/w Eliquis for anticoagulation            Respiratory failure with hypoxia.  Plan: -Pt noted to be hypoxic with increased work of breathing s/p ambulation on admission. CXR and CTA chest clear; no e/o PE. Not requiring oxygen. No further dyspnea. COVID negative                        Discharge Medication reconciliation and follow up reviewed with Medical Attending and confirmed before discharge on 6/17

## 2020-06-17 NOTE — DISCHARGE NOTE PROVIDER - NSDCMRMEDTOKEN_GEN_ALL_CORE_FT
aspirin 81 mg oral delayed release tablet: 1 tab(s) orally once a day  escitalopram 5 mg oral tablet: 1 tab(s) orally once a day  levothyroxine 112 mcg (0.112 mg) oral tablet: 1 tab(s) orally once a day  Livalo 4 mg oral tablet: 1 tab(s) orally once a day  LORazepam 0.5 mg oral tablet: 1 tab(s) orally every 6 hours  metFORMIN 500 mg oral tablet, extended release: 1 tab(s) orally once a day  Metoprolol Tartrate 25 mg oral tablet: 1 tab(s) orally 2 times a day aspirin 81 mg oral delayed release tablet: 1 tab(s) orally once a day  cefuroxime 500 mg oral tablet: 1 tab(s) orally 2 times a day thru 6/21  escitalopram 5 mg oral tablet: 1 tab(s) orally once a day  levothyroxine 112 mcg (0.112 mg) oral tablet: 1 tab(s) orally once a day  Livalo 4 mg oral tablet: 1 tab(s) orally once a day  metFORMIN 500 mg oral tablet, extended release: 1 tab(s) orally once a day  metoprolol tartrate 25 mg oral tablet: 0.5 tab(s) orally 2 times a day apixaban 5 mg oral tablet: 1 tab(s) orally every 12 hours  aspirin 81 mg oral delayed release tablet: 1 tab(s) orally once a day  cefuroxime 500 mg oral tablet: 1 tab(s) orally 2 times a day thru 6/21  escitalopram 5 mg oral tablet: 1 tab(s) orally once a day  levothyroxine 112 mcg (0.112 mg) oral tablet: 1 tab(s) orally once a day  Livalo 4 mg oral tablet: 1 tab(s) orally once a day  metFORMIN 500 mg oral tablet, extended release: 1 tab(s) orally once a day  metoprolol tartrate 25 mg oral tablet: 0.5 tab(s) orally 2 times a day aspirin 81 mg oral delayed release tablet: 1 tab(s) orally once a day  cefuroxime 500 mg oral tablet: 1 tab(s) orally 2 times a day thru 6/21  escitalopram 5 mg oral tablet: 1 tab(s) orally once a day  levothyroxine 112 mcg (0.112 mg) oral tablet: 1 tab(s) orally once a day  Livalo 4 mg oral tablet: 1 tab(s) orally once a day  metFORMIN 500 mg oral tablet, extended release: 1 tab(s) orally once a day  metoprolol tartrate 25 mg oral tablet: 0.5 tab(s) orally 2 times a day   Xarelto 20 mg oral tablet: 1 tab(s) orally once a day in the evening with a meal cefuroxime 500 mg oral tablet: 1 tab(s) orally 2 times a day thru 6/21  escitalopram 5 mg oral tablet: 1 tab(s) orally once a day  levothyroxine 112 mcg (0.112 mg) oral tablet: 1 tab(s) orally once a day  Livalo 4 mg oral tablet: 1 tab(s) orally once a day  metFORMIN 500 mg oral tablet, extended release: 1 tab(s) orally once a day  metoprolol tartrate 25 mg oral tablet: 0.5 tab(s) orally 2 times a day   Xarelto 20 mg oral tablet: 1 tab(s) orally once a day in the evening with a meal

## 2020-06-17 NOTE — DISCHARGE NOTE NURSING/CASE MANAGEMENT/SOCIAL WORK - NSDCPEXARELTO_GEN_ALL_CORE
Rivaroxaban/Xarelto - Dietary Advice/Rivaroxaban/Xarelto - Follow up monitoring/Rivaroxaban/Xarelto - Compliance/Rivaroxaban/Xarelto - Potential for adverse drug reactions and interactions

## 2020-06-17 NOTE — PROGRESS NOTE ADULT - PROBLEM SELECTOR PLAN 2
New onset afib rvr in setting of sepsis. 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 s/p micra placement 6/15/20, c/w monitoring  Now in sinus rhythm. CHADSVASC elevated.   -c/w Eliquis for anticoagulation, will verify coverage with pharmacy vs eliquis  -TSH wnl  - outpt follow up with Ep 6/29 as ascheduled

## 2020-06-17 NOTE — PROGRESS NOTE ADULT - REASON FOR ADMISSION
Concern for COVID-19

## 2020-06-17 NOTE — DIETITIAN INITIAL EVALUATION ADULT. - PROBLEM SELECTOR PLAN 4
New onset afib rvr s/p 5mg IV metop x2 then 50mg po. Now in upper 50s sinus  [id number] 5  -heparin gtt started  -tele  -cards recs  -TTE  -check TSH  -trend trops, likely elevated 2/2 demand ischemia in setting of afib rvr

## 2020-06-17 NOTE — PROGRESS NOTE ADULT - SUBJECTIVE AND OBJECTIVE BOX
[facility] Division of Hospital Medicine  Efe Warren MD  Pager [phone]      Patient is a 79y old  Female who presents with a chief complaint of Concern for COVID-19 (17 Jun 2020 11:48)      SUBJECTIVE / OVERNIGHT EVENTS: No fever or chills. Ambulating with no difficulty    MEDICATIONS  (STANDING):  apixaban 5 milliGRAM(s) Oral every 12 hours  atorvastatin 20 milliGRAM(s) Oral at bedtime  cefTRIAXone   IVPB 2000 milliGRAM(s) IV Intermittent every 24 hours  dextrose 5%. 1000 milliLiter(s) (50 mL/Hr) IV Continuous <Continuous>  dextrose 50% Injectable 12.5 Gram(s) IV Push once  dextrose 50% Injectable 25 Gram(s) IV Push once  dextrose 50% Injectable 25 Gram(s) IV Push once  escitalopram 5 milliGRAM(s) Oral daily  insulin lispro (HumaLOG) corrective regimen sliding scale   SubCutaneous three times a day before meals  insulin lispro (HumaLOG) corrective regimen sliding scale   SubCutaneous at bedtime  levothyroxine 112 MICROGram(s) Oral daily  metoprolol tartrate 12.5 milliGRAM(s) Oral two times a day    MEDICATIONS  (PRN):  acetaminophen   Tablet .. 650 milliGRAM(s) Oral every 6 hours PRN Temp greater or equal to 38C (100.4F)  ALBUTerol    90 MICROgram(s) HFA Inhaler 2 Puff(s) Inhalation every 6 hours PRN Shortness of Breath and/or Wheezing  dextrose 40% Gel 15 Gram(s) Oral once PRN Blood Glucose LESS THAN 70 milliGRAM(s)/deciliter  glucagon  Injectable 1 milliGRAM(s) IntraMuscular once PRN Glucose LESS THAN 70 milligrams/deciliter      CAPILLARY BLOOD GLUCOSE      POCT Blood Glucose.: 125 mg/dL (17 Jun 2020 11:46)  POCT Blood Glucose.: 136 mg/dL (17 Jun 2020 07:20)  POCT Blood Glucose.: 177 mg/dL (16 Jun 2020 21:54)  POCT Blood Glucose.: 129 mg/dL (16 Jun 2020 17:05)    I&O's Summary      PHYSICAL EXAM:  Vital Signs Last 24 Hrs  T(C): 36.3 (17 Jun 2020 09:01), Max: 36.9 (17 Jun 2020 01:19)  T(F): 97.4 (17 Jun 2020 09:01), Max: 98.5 (17 Jun 2020 05:19)  HR: 58 (17 Jun 2020 09:01) (51 - 61)  BP: 131/52 (17 Jun 2020 09:01) (120/78 - 157/91)  BP(mean): --  RR: 18 (17 Jun 2020 09:01) (17 - 19)  SpO2: 98% (17 Jun 2020 09:01) (95% - 98%)    CONSTITUTIONAL: NAD  EYES: conjunctiva and sclera clear  ENMT: mmm  NECK: Supple,  RESPIRATORY: Normal respiratory effort; lungs are clear to auscultation bilaterally  CARDIOVASCULAR: Regular rate and rhythm, + S1 and S2  ABDOMEN: Nontender to palpation, normoactive bowel sounds, no rebound/guarding  MUSCULOSKELETAL:  no clubbing or cyanosis of digits;   PSYCH: A+O x 3  SKIN: R groin with no hematoma or ecchymoses    LABS:                        13.2   10.84 )-----------( 422      ( 17 Jun 2020 05:37 )             38.9     06-16    140  |  102  |  18  ----------------------------<  146<H>  4.2   |  25  |  0.64    Ca    9.8      16 Jun 2020 07:25  Phos  3.5     06-16  Mg     2.4     06-16    TPro  7.4  /  Alb  4.0  /  TBili  0.3  /  DBili  x   /  AST  42<H>  /  ALT  87<H>  /  AlkPhos  58  06-16

## 2020-06-17 NOTE — PROGRESS NOTE ADULT - ASSESSMENT
80 y/o female PMHX DM, HLD, C. diff?, Hypothyroid, Obesity, REMOTE PE and phlebitis, PREMA not on CPAP, presented with c/o fever (102.3), found to have E. Coli bacteremia and being treated with antibiotics. Noted to have Afib RVR and asymptomatic sinus bradycardia with HR as low as 28 in the setting of sleep apnea, and metoprolol use. Patient is s/p leadless PM(AV Micra) implantation Monday. Groin site clean, dry and benign with no bleeding or hematoma. Post procedure instructions were given to patient yesterday. She has no further questions today.     - Continue beta blocker(pt had afib with RVR)  - Continue anticoagulation with apixaban 5mg BID. May discontinue aspirin, discussed with Dr. Machado.   - CPAP during sleep if possible as patient has known history of sleep apnea  - F/U appointment with device clinic on 6/29/20 @ 8:15 am  -No EP contraindication to discharge

## 2020-06-17 NOTE — PHYSICAL THERAPY INITIAL EVALUATION ADULT - LIVES WITH, PROFILE
spouse/3 steps to enter home with handrails; Stairs throughout home that patient does not have to use.

## 2020-06-17 NOTE — DISCHARGE NOTE PROVIDER - NSFOLLOWUPCLINICS_GEN_ALL_ED_FT
[facility] [place]  Internal Medicine  [address] [place] [address]  Phone: [phone]  Fax: [phone]  Follow Up Time:

## 2020-06-17 NOTE — DISCHARGE NOTE NURSING/CASE MANAGEMENT/SOCIAL WORK - PATIENT PORTAL LINK FT
You can access the FollowMyHealth Patient Portal offered by [facility] by registering at the following website: [email or web address]. By joining [facility]’s FollowMyHealth portal, you will also be able to view your health information using other applications (apps) compatible with our system.

## 2020-06-17 NOTE — PROGRESS NOTE ADULT - PROBLEM SELECTOR PLAN 1
due to E coli bacteremia, WBC trended down initially but uptrended  now improving. s/p micra placement, ID wants to Trend WBC in the AM. Will likely dc in the AM if WBC improves  -suspect urine source given Ucx with >100K CFU/mL GNR  -Bcx sensitivities returned, c/w 2gm CTX q24h, repeat CX NGTD, will likely transition to po dc; clinically improving and afebrile

## 2020-06-17 NOTE — PROGRESS NOTE ADULT - SUBJECTIVE AND OBJECTIVE BOX
Patient seen and evaluated today. No significant events overnight. Feels generally well. Patient is s/p AV Micra implant, post procedure day #2. She denies any pain/issues with right groin puncture site. She reports brief episode of lightheadedness when lying down this am but otherwise has been feeling well. She has been ambulating without dizziness or lightheadedness. She denies CP/SOB/palpitations. Telemetry review demonstrates SR 50s with intermittent AV sync pacing and occasional V pacing. Occasional fused beats noted.   HR: 58 (06-17-20 @ 09:01) (51 - 61).    MEDICATIONS:  acetaminophen   Tablet .. 650 milliGRAM(s) Oral every 6 hours PRN  ALBUTerol    90 MICROgram(s) HFA Inhaler 2 Puff(s) Inhalation every 6 hours PRN  apixaban 5 milliGRAM(s) Oral every 12 hours  atorvastatin 20 milliGRAM(s) Oral at bedtime  cefTRIAXone   IVPB 2000 milliGRAM(s) IV Intermittent every 24 hours  dextrose 40% Gel 15 Gram(s) Oral once PRN  dextrose 5%. 1000 milliLiter(s) IV Continuous <Continuous>  dextrose 50% Injectable 12.5 Gram(s) IV Push once  dextrose 50% Injectable 25 Gram(s) IV Push once  dextrose 50% Injectable 25 Gram(s) IV Push once  escitalopram 5 milliGRAM(s) Oral daily  glucagon  Injectable 1 milliGRAM(s) IntraMuscular once PRN  insulin lispro (HumaLOG) corrective regimen sliding scale   SubCutaneous three times a day before meals  insulin lispro (HumaLOG) corrective regimen sliding scale   SubCutaneous at bedtime  levothyroxine 112 MICROGram(s) Oral daily  metoprolol tartrate 12.5 milliGRAM(s) Oral two times a day      REVIEW OF SYSTEMS:  CONSTITUTIONAL: No fever, weight loss, or fatigue  NECK: No pain or stiffness  RESPIRATORY: No cough, wheezing, chills or hemoptysis; No Shortness of Breath  CARDIOVASCULAR: No chest pain, palpitations, passing out, dizziness, or leg swelling  GASTROINTESTINAL: No abdominal or epigastric pain. No nausea, vomiting, or hematemesis; No diarrhea or constipation. No melena or hematochezia.  NEUROLOGICAL: No headaches, memory loss, loss of strength, numbness, or tremors  SKIN: No itching, burning, rashes, or lesions   PSYCHIATRIC: No depression, anxiety, mood swings, or difficulty sleeping  HEME/LYMPH: No easy bruising, or bleeding gums  ALLERGY AND IMMUNOLOGIC: No hives or eczema	  All others negative    PHYSICAL EXAM:  T(C): 36.3 (06-17-20 @ 09:01), Max: 36.9 (06-17-20 @ 01:19)  HR: 58 (06-17-20 @ 09:01) (51 - 61)  BP: 131/52 (06-17-20 @ 09:01) (120/78 - 157/91)  RR: 18 (06-17-20 @ 09:01) (17 - 19)  SpO2: 98% (06-17-20 @ 09:01) (95% - 98%)  Wt(kg): --  I&O's Summary     Appearance: Normal	  HEENT:   Normal oral mucosa, PERRL, EOMI	  Lymphatic: No lymphadenopathy  Cardiovascular: Normal S1 S2, No JVD, No edema  Respiratory: Lungs clear to auscultation	  Psychiatry: A & O x 3, Mood & affect appropriate  Gastrointestinal:  Soft, Non-tender, + BS	  Skin: No rashes, No ecchymoses, No cyanosis	  Neurologic: Non-focal  Extremities: Normal range of motion, No clubbing, cyanosis or edema  Right groin puncture site-bandage intact, no active bleeding or hematoma, mild tenderness on palpation, site appears benign, pulse, motor and sensory grossly intact bilateral lower extremities   Vascular: Peripheral pulses palpable 2+ bilaterally    DIAGNOSTICS:  TELEMETRY: 	  as above    LABS:	 	                          13.2   10.84 )-----------( 422      ( 17 Jun 2020 05:37 )             38.9     06-16    140  |  102  |  18  ----------------------------<  146<H>  4.2   |  25  |  0.64    Ca    9.8      16 Jun 2020 07:25  Phos  3.5     06-16  Mg     2.4     06-16    TPro  7.4  /  Alb  4.0  /  TBili  0.3  /  DBili  x   /  AST  42<H>  /  ALT  87<H>  /  AlkPhos  58  06-16

## 2020-06-17 NOTE — DISCHARGE NOTE PROVIDER - NSDCFUSCHEDAPPT_GEN_ALL_CORE_FT
[facility] ; 06/29/2020 ; [facility] [address] [facility] ; 06/29/2020 ; [facility] [address] [facility] ; 06/29/2020 ; [facility] [address] [facility] ; 06/29/2020 ; [facility] [address] [facility] ; 06/29/2020 ; [facility] [address] [facility]CHER [facility] ; 06/29/2020 ; [facility] [address]

## 2020-06-17 NOTE — PROGRESS NOTE ADULT - ASSESSMENT
79 f with DM, hypothyroidism, diverticulitis and multiple bowel resections p/w chills, body pain and no other symptoms  here febrile to 102.8 normal WBC  u/a with 11-25 WBC, urine and blood cx: E-coli (pan S)  abd/pelvis CT negative, chest CTA: no PE  COVID negative  pt found to have a-fib with RVR and then david s/p micra 6/15    fever, tachycardia, sepsis  E-coli bacteremia likely due to UTI but no urinary symptoms and no other focus of infection, repeat negative 6/12  tachy-david   new leukocytosis like reactive post micra placement now improved to 10    * s/p micra 6/15  * on ceftriaxone 2 q d, now day 6 post negative blood cx  * on discharge will switch to a PO cefuroxime 500 q 12 to complete a 10 day course until 6/21        The above assessment and plan was discussed with the primary team    Jolynn Aguilar MD  Pager [phone]  After 5pm and on weekends call [phone]

## 2020-06-17 NOTE — DIETITIAN INITIAL EVALUATION ADULT. - PERTINENT LABORATORY DATA
06-16 Na 140 mmol/L Glu 146 mg/dL<H> K+ 4.2 mmol/L Cr 0.64 mg/dL BUN 18 mg/dL Phos 3.5 mg/dL  06-17 @ 11:46 POCT 125 mg/dL  06-17 @ 07:20 POCT 136 mg/dL  06-16 @ 21:54 POCT 177 mg/dL  06-16 @ 17:05 POCT 129 mg/dL

## 2020-06-17 NOTE — PHYSICAL THERAPY INITIAL EVALUATION ADULT - PLANNED THERAPY INTERVENTIONS, PT EVAL
DC PT as patient deferred stair training (as she has "only 3" to enter, states that  will be present, but she is independent with them) and is functioning at baseline.

## 2020-06-17 NOTE — DIETITIAN INITIAL EVALUATION ADULT. - ADD RECOMMEND
1. Low sodium diet education was discussed. Pt's questions about current food choices answered. 2. Reinforce any education as needed.

## 2020-06-17 NOTE — PROGRESS NOTE ADULT - ATTENDING COMMENTS
Personally saw and examined patient  labs, vitals reviewed  agree with above assessment and plan  septic with bacteremia, unclear source, cont w/u and tx per primary team  tachy david, david to 30s at times, will likely need ppm on this admission, EPS to follow  hold AVN blockers, keep atropine at bedside
78 y/o female PMHX DM, HLD, C. diff?, Hypothyroid, Obesity, REMOTE PE and phlebitis, PREMA not on CPAP, presented with c/o fever (102.3), found to have E. Coli bacteremia and being treated with antibiotics. Noted to have Afib RVR and asymptomatic sinus bradycardia with HR as low as 28 in the setting of sleep apnea, and metoprolol use. Metoprolol on hold now. Currently telemetry reveals sinus rhythm with HR 60s-70s when patient is awake. TSH normal. Will follow on tele for further david events. May need Micra PPM when bacteremia resolves. Will follow.
78 y/o female PMHX DM, HLD, C. diff?, Hypothyroid, Obesity, REMOTE PE and phlebitis, PREMA not on CPAP, presented with c/o fever (102.3), found to have E. Coli bacteremia and being treated with antibiotics. Noted to have Afib RVR and asymptomatic sinus bradycardia with HR as low as 28 in the setting of sleep apnea, and metoprolol use. Patient is s/p leadless PM(AV Micra) implantation Monday. Groin site clean, dry and benign with no bleeding or hematoma. Post procedure instructions were given to patient yesterday. Will follow in office.
80 y/o female PMHX DM, HLD, C. diff?, Hypothyroid, Obesity, REMOTE PE and phlebitis, PREMA not on CPAP, presented with c/o fever (102.3), found to have E. Coli bacteremia and being treated with antibiotics. Noted to have Afib RVR and sinus bradycardia with HR as low as 28 in the setting of sleep apnea, and metoprolol use. Now s/p leadless PPM implant to facilitate metoprolol use. Will follow.
Personally saw and examined patient  labs and vitals reviewed  agree with above assessment and plan  cont tele monitoring, will need EPS eval when acute infection resolves  agree with a/c for shantell 6  will follow
Follow up with EP 6/29/20 at 8:15Am  Spent 40mins on dc
Will give tylenol for headache

## 2020-06-17 NOTE — DIETITIAN INITIAL EVALUATION ADULT. - PROBLEM SELECTOR PLAN 1
-Pt noted to be hypoxic with increased work of breathing s/p ambulation. Also getting IVF bolus and maintenance IVF with suspicion for acute pulm edema superimposed on likely covid infection  -Stop IVF, treat with lasix x1  -Check CXR  -albuterol prn  -started on NRB, can de-escalate oxygen as tolerated  -Pt is full code, low threshold for ICU eval

## 2020-06-17 NOTE — DISCHARGE NOTE PROVIDER - NSDCCPCAREPLAN_GEN_ALL_CORE_FT
PRINCIPAL DISCHARGE DIAGNOSIS  Diagnosis: Severe sepsis  Assessment and Plan of Treatment:       SECONDARY DISCHARGE DIAGNOSES  Diagnosis: Atrial fibrillation with RVR  Assessment and Plan of Treatment: Atrial fibrillation with RVR  - F/U appointment with device clinic on 6/29/20 @ 8:15 am   Please continue your medications as directed Continue to take your blood thinner as prescribed and follow with your physician/cardiologist to monitor your levels to further manage your care. Monitor for signs/symptoms of uncontrolled atrial fibrillation, such as, increased heart rate, palpitations, chest pain, dizziness, or shortness of breath - Return to emergency room if these signs/symptoms are present. Low fat diet, reduce caffeine intake, and exercise at least 30 minutes daily.       Diagnosis: DM (diabetes mellitus)  Assessment and Plan of Treatment: DM (diabetes mellitus) Continue your medication regimen and a consistent carbohydrate diet (Meaning eating the same amount of carbohydrates at the same time each day). Monitor blood glucose levels throughout the day before meals and at bedtime. Record blood sugars and bring to outpatient providers appointment in order to be reviewed by your doctor for management modifications. If your sugars are more than 400 or less than 70 you should contact your PCP immediately. Monitor for signs/symptoms of low blood glucose, such as, dizziness, altered mental status, or cool/clammy skin. In addition, monitor for signs/symptoms of high blood glucose, such as, feeling hot, dry, fatigued, or with increased thirst/urination. Monitor finger sticks pre-meal and bedtime, low salt, fat and carbohydrate diet, minimize glucose intake.  Exercise daily for at least 30 minutes and weight loss.  Follow up with primary care physician and endocrinologist for routine Hemoglobin A1C checks and management.  Follow up with your ophthalmologist for routine yearly vision exams.Make regular podiatry appointments in order to have feet checked for wounds and uncontrolled toe nail growth to prevent infections, as well as, appointments with an ophthalmologist to monitor your vision.    Diagnosis: Hyperlipidemia  Assessment and Plan of Treatment: Hyperlipidemia Continue prescribed medications to control your cholesterol levels and a DASH (Low fat/salt) diet. Follow up with your primary care provider upon discharge for further management and monitoring of cholesterol levels. exercise daily and continue current medications. Follow up with primary care physician and cardiologist for management. PRINCIPAL DISCHARGE DIAGNOSIS  Diagnosis: Severe sepsis  Assessment and Plan of Treatment:       SECONDARY DISCHARGE DIAGNOSES  Diagnosis: Atrial fibrillation with RVR  Assessment and Plan of Treatment: Atrial fibrillation with RVR  - F/U appointment with device clinic Dr Machado on 6/29/20 @ 8:15 am   Please continue your medications as directed Continue to take your blood thinner as prescribed and follow with your physician/cardiologist to monitor your levels to further manage your care. Monitor for signs/symptoms of uncontrolled atrial fibrillation, such as, increased heart rate, palpitations, chest pain, dizziness, or shortness of breath - Return to emergency room if these signs/symptoms are present. Low fat diet, reduce caffeine intake, and exercise at least 30 minutes daily.       Diagnosis: DM (diabetes mellitus)  Assessment and Plan of Treatment: DM (diabetes mellitus) Continue your medication regimen and a consistent carbohydrate diet (Meaning eating the same amount of carbohydrates at the same time each day). Monitor blood glucose levels throughout the day before meals and at bedtime. Record blood sugars and bring to outpatient providers appointment in order to be reviewed by your doctor for management modifications. If your sugars are more than 400 or less than 70 you should contact your PCP immediately. Monitor for signs/symptoms of low blood glucose, such as, dizziness, altered mental status, or cool/clammy skin. In addition, monitor for signs/symptoms of high blood glucose, such as, feeling hot, dry, fatigued, or with increased thirst/urination. Monitor finger sticks pre-meal and bedtime, low salt, fat and carbohydrate diet, minimize glucose intake.  Exercise daily for at least 30 minutes and weight loss.  Follow up with primary care physician and endocrinologist for routine Hemoglobin A1C checks and management.  Follow up with your ophthalmologist for routine yearly vision exams.Make regular podiatry appointments in order to have feet checked for wounds and uncontrolled toe nail growth to prevent infections, as well as, appointments with an ophthalmologist to monitor your vision.    Diagnosis: Hyperlipidemia  Assessment and Plan of Treatment: Hyperlipidemia Continue prescribed medications to control your cholesterol levels and a DASH (Low fat/salt) diet. Follow up with your primary care provider upon discharge for further management and monitoring of cholesterol levels. exercise daily and continue current medications. Follow up with primary care physician and cardiologist for management. PRINCIPAL DISCHARGE DIAGNOSIS  Diagnosis: Severe sepsis  Assessment and Plan of Treatment: due to E coli bacteremia, WBC trended down initially but uptrended s/p micra placement, now downtrended  -suspect urine source given Ucx with >100K CFU/mL GNR  -Bcx sensitivities returned, c/w 2gm CTX q24h, repeat CX NGTD, will likely transition to po dc; clinically improving and afebrile.         SECONDARY DISCHARGE DIAGNOSES  Diagnosis: Atrial fibrillation with RVR  Assessment and Plan of Treatment: Atrial fibrillation with RVR  - F/U appointment with device clinic Dr Machado on 6/29/20 @ 8:15 am   Please continue your medications as directed Continue to take your blood thinner as prescribed and follow with your physician/cardiologist to monitor your levels to further manage your care. Monitor for signs/symptoms of uncontrolled atrial fibrillation, such as, increased heart rate, palpitations, chest pain, dizziness, or shortness of breath - Return to emergency room if these signs/symptoms are present. Low fat diet, reduce caffeine intake, and exercise at least 30 minutes daily.       Diagnosis: DM (diabetes mellitus)  Assessment and Plan of Treatment: DM (diabetes mellitus) Continue your medication regimen and a consistent carbohydrate diet (Meaning eating the same amount of carbohydrates at the same time each day). Monitor blood glucose levels throughout the day before meals and at bedtime. Record blood sugars and bring to outpatient providers appointment in order to be reviewed by your doctor for management modifications. If your sugars are more than 400 or less than 70 you should contact your PCP immediately. Monitor for signs/symptoms of low blood glucose, such as, dizziness, altered mental status, or cool/clammy skin. In addition, monitor for signs/symptoms of high blood glucose, such as, feeling hot, dry, fatigued, or with increased thirst/urination. Monitor finger sticks pre-meal and bedtime, low salt, fat and carbohydrate diet, minimize glucose intake.  Exercise daily for at least 30 minutes and weight loss.  Follow up with primary care physician and endocrinologist for routine Hemoglobin A1C checks and management.  Follow up with your ophthalmologist for routine yearly vision exams.Make regular podiatry appointments in order to have feet checked for wounds and uncontrolled toe nail growth to prevent infections, as well as, appointments with an ophthalmologist to monitor your vision.    Diagnosis: Adult Hypothyroidism  Assessment and Plan of Treatment: Adult Hypothyroidism    Diagnosis: Hyperlipidemia  Assessment and Plan of Treatment: Hyperlipidemia Continue prescribed medications to control your cholesterol levels and a DASH (Low fat/salt) diet. Follow up with your primary care provider upon discharge for further management and monitoring of cholesterol levels. exercise daily and continue current medications. Follow up with primary care physician and cardiologist for management. PRINCIPAL DISCHARGE DIAGNOSIS  Diagnosis: Severe sepsis  Assessment and Plan of Treatment: due to E coli bacteremia, WBC trended down initially but uptrended s/p micra placement, now downtrended  -suspect urine source given Ucx with >100K CFU/mL GNR  continue antibiotics as directed, follow up with PCP, if you have fevers that are not controlled with tylenol please call your physician        SECONDARY DISCHARGE DIAGNOSES  Diagnosis: Atrial fibrillation with RVR  Assessment and Plan of Treatment: Atrial fibrillation with RVR  - F/U appointment with device clinic Dr Machado on 6/29/20 @ 8:15 am   Please continue your medications as directed Continue to take your blood thinner as prescribed and follow with your physician/cardiologist to monitor your levels to further manage your care. Monitor for signs/symptoms of uncontrolled atrial fibrillation, such as, increased heart rate, palpitations, chest pain, dizziness, or shortness of breath - Return to emergency room if these signs/symptoms are present. Low fat diet, reduce caffeine intake, and exercise at least 30 minutes daily.       Diagnosis: DM (diabetes mellitus)  Assessment and Plan of Treatment: DM (diabetes mellitus) Continue your medication regimen and a consistent carbohydrate diet (Meaning eating the same amount of carbohydrates at the same time each day). Monitor blood glucose levels throughout the day before meals and at bedtime. Record blood sugars and bring to outpatient providers appointment in order to be reviewed by your doctor for management modifications. If your sugars are more than 400 or less than 70 you should contact your PCP immediately. Monitor for signs/symptoms of low blood glucose, such as, dizziness, altered mental status, or cool/clammy skin. In addition, monitor for signs/symptoms of high blood glucose, such as, feeling hot, dry, fatigued, or with increased thirst/urination. Monitor finger sticks pre-meal and bedtime, low salt, fat and carbohydrate diet, minimize glucose intake.  Exercise daily for at least 30 minutes and weight loss.  Follow up with primary care physician and endocrinologist for routine Hemoglobin A1C checks and management.  Follow up with your ophthalmologist for routine yearly vision exams.Make regular podiatry appointments in order to have feet checked for wounds and uncontrolled toe nail growth to prevent infections, as well as, appointments with an ophthalmologist to monitor your vision.    Diagnosis: Adult Hypothyroidism  Assessment and Plan of Treatment: Adult Hypothyroidism follow up with outpatient primary care doctor    Diagnosis: Hyperlipidemia  Assessment and Plan of Treatment: Hyperlipidemia Continue prescribed medications to control your cholesterol levels and a DASH (Low fat/salt) diet. Follow up with your primary care provider upon discharge for further management and monitoring of cholesterol levels. exercise daily and continue current medications. Follow up with primary care physician and cardiologist for management.

## 2020-06-17 NOTE — DIETITIAN INITIAL EVALUATION ADULT. - PERTINENT MEDS FT
MEDICATIONS  (STANDING):  apixaban 5 milliGRAM(s) Oral every 12 hours  atorvastatin 20 milliGRAM(s) Oral at bedtime  cefTRIAXone   IVPB 2000 milliGRAM(s) IV Intermittent every 24 hours  dextrose 5%. 1000 milliLiter(s) (50 mL/Hr) IV Continuous <Continuous>  dextrose 50% Injectable 12.5 Gram(s) IV Push once  dextrose 50% Injectable 25 Gram(s) IV Push once  dextrose 50% Injectable 25 Gram(s) IV Push once  escitalopram 5 milliGRAM(s) Oral daily  insulin lispro (HumaLOG) corrective regimen sliding scale   SubCutaneous three times a day before meals  insulin lispro (HumaLOG) corrective regimen sliding scale   SubCutaneous at bedtime  levothyroxine 112 MICROGram(s) Oral daily  metoprolol tartrate 12.5 milliGRAM(s) Oral two times a day

## 2020-06-17 NOTE — DIETITIAN INITIAL EVALUATION ADULT. - PROBLEM SELECTOR PLAN 2
+fever, HR, and lactate likely 2/2 COVID-19  -Denies dysuria, likely asymptomatic bacteriuria   -f/u COVID swab  -f/u BCx x2  -f/u UCx  -acetaminophen PRN fever

## 2020-06-17 NOTE — DISCHARGE NOTE PROVIDER - CARE PROVIDER_API CALL
Omid Machado  CARDIAC ELECTROPHYSIOLOGY  [address] [address] [place] [address]  Phone: [phone]  Fax: [phone]  Follow Up Time: 1 week

## 2020-06-17 NOTE — PROGRESS NOTE ADULT - SUBJECTIVE AND OBJECTIVE BOX
Follow Up:  E-coli bacteremia    Interval History: s/p micra placement 6/15 and no complains    ROS:      All other systems negative    Constitutional: no fever, no chills  Eyes: no vision changes, no eye pain  ENT:  no sore throat, no rhinorrhea  Cardiovascular:  no chest pain, no palpitation  Respiratory:  no SOB, no cough  GI:  no abd pain, no vomiting, no diarrhea  urinary: no dysuria, no hematuria, no flank pain  musculoskeletal:  no joint pain, no joint swelling  skin:  no rash  neurology:  no headache, no seizure, no change in mental status        Allergies  No Known Allergies        ANTIMICROBIALS:  cefTRIAXone   IVPB 2000 every 24 hours      OTHER MEDS:  acetaminophen   Tablet .. 650 milliGRAM(s) Oral every 6 hours PRN  ALBUTerol    90 MICROgram(s) HFA Inhaler 2 Puff(s) Inhalation every 6 hours PRN  apixaban 5 milliGRAM(s) Oral every 12 hours  atorvastatin 20 milliGRAM(s) Oral at bedtime  dextrose 40% Gel 15 Gram(s) Oral once PRN  dextrose 5%. 1000 milliLiter(s) IV Continuous <Continuous>  dextrose 50% Injectable 12.5 Gram(s) IV Push once  dextrose 50% Injectable 25 Gram(s) IV Push once  dextrose 50% Injectable 25 Gram(s) IV Push once  escitalopram 5 milliGRAM(s) Oral daily  glucagon  Injectable 1 milliGRAM(s) IntraMuscular once PRN  insulin lispro (HumaLOG) corrective regimen sliding scale   SubCutaneous three times a day before meals  insulin lispro (HumaLOG) corrective regimen sliding scale   SubCutaneous at bedtime  levothyroxine 112 MICROGram(s) Oral daily  metoprolol tartrate 12.5 milliGRAM(s) Oral two times a day      Vital Signs Last 24 Hrs  T(C): 36.3 (17 Jun 2020 09:01), Max: 36.9 (17 Jun 2020 01:19)  T(F): 97.4 (17 Jun 2020 09:01), Max: 98.5 (17 Jun 2020 05:19)  HR: 58 (17 Jun 2020 09:01) (51 - 61)  BP: 131/52 (17 Jun 2020 09:01) (120/78 - 157/91)  BP(mean): --  RR: 18 (17 Jun 2020 09:01) (17 - 19)  SpO2: 98% (17 Jun 2020 09:01) (95% - 98%)    Physical Exam:  General:    NAD,  non toxic  Head: atraumatic, normocephalic  Eye: normal sclera and conjunctiva  ENT:    no oropharyngeal lesions,   no LAD,   neck supple  Cardio:     regular S1, S2,  no murmur  Respiratory:    clear b/l,    no wheezing  abd:     soft,   BS +,   no tenderness,    no organomegaly  :   no CVAT,  no suprapubic tenderness,   no  ritchie  Musculoskeletal:   no joint swelling,   no edema  vascular: no phlebitis, normal pulses  Skin:    no rash  Neurologic:     no focal deficit  psych: normal affect                          13.2   10.84 )-----------( 422      ( 17 Jun 2020 05:37 )             38.9       06-16    140  |  102  |  18  ----------------------------<  146<H>  4.2   |  25  |  0.64    Ca    9.8      16 Jun 2020 07:25  Phos  3.5     06-16  Mg     2.4     06-16    TPro  7.4  /  Alb  4.0  /  TBili  0.3  /  DBili  x   /  AST  42<H>  /  ALT  87<H>  /  AlkPhos  58  06-16          MICROBIOLOGY:  v  .Blood Blood-Venous  06-12-20   No growth to date.  --  --      .Blood Blood-Peripheral  06-10-20   Growth in aerobic and anaerobic bottles: Escherichia coli  ***Blood Panel PCR results on this specimen are available  approximately 3 hours after the Gram stain result.***  Gram stain, PCR, and/or culture results may not always  correspond due to difference in methodologies.  ************************************************************  This PCR assay was performed using [employer].  The following targets are tested for: Enterococcus,  vancomycin resistant enterococci, Listeria monocytogenes,      .Urine Clean Catch (Midstream)  06-10-20   >100,000 CFU/ml Escherichia coli  --  Escherichia coli      .Blood Blood-Venous  06-10-20   Growth in aerobic and anaerobic bottles: Escherichia coli  See previous culture [id number]  --    Growth in anaerobic bottle: Gram Negative Rods  Growth in aerobic bottle: Gram Negative Rods                RADIOLOGY:  Images below independently visualized and reviewed personally, findings as below  < from: Xray Chest 1 View- PORTABLE-Urgent (06.15.20 @ 18:32) >      IMPRESSION:  No pneumothorax following Micra device placement.      < from: CT Angio Chest w/ IV Cont (06.10.20 @ 18:02) >    IMPRESSION:     1.  No pulmonary embolus.    2.  Clear lungs.        < from: CT Abdomen and Pelvis w/ IV Cont (06.10.20 @ 03:16) >  IMPRESSION:    Small bowel and colonic diverticulosis without acute diverticulitis. Bowel postoperative changes without obstruction or extraluminal collection.    Mild bilateral peripheral ground glass opacities are nonspecific and may be seen in the setting of viral pneumonia such as COVID-19. Correlate with clinical parameters and laboratory values. Consider short-term chest radiograph follow-up as indicated.    Additional findings as mentioned above.    < from: TTE with Doppler (w/Cont) (06.12.20 @ 11:26) >  CONCLUSIONS:  1. Mitral annular calcification, otherwise normal mitral  valve. Minimalmitral regurgitation.  2. Endocardium not well visualized; grossly normal left  ventricular systolic function. Endocardial visualization  enhanced with intravenous injection of echo contrast  (Definity).  3. The right ventricle is not well visualized; grossly  normal right ventricular systolic function.

## 2020-06-25 ENCOUNTER — APPOINTMENT (OUTPATIENT)
Dept: ELECTROPHYSIOLOGY | Facility: CLINIC | Age: 80
End: 2020-06-25
Payer: MEDICARE

## 2020-06-25 DIAGNOSIS — I48.91 UNSPECIFIED ATRIAL FIBRILLATION: ICD-10-CM

## 2020-06-25 DIAGNOSIS — R00.1 BRADYCARDIA, UNSPECIFIED: ICD-10-CM

## 2020-06-25 PROCEDURE — 93279 PRGRMG DEV EVAL PM/LDLS PM: CPT

## 2020-06-29 ENCOUNTER — APPOINTMENT (OUTPATIENT)
Dept: ELECTROPHYSIOLOGY | Facility: CLINIC | Age: 80
End: 2020-06-29

## 2020-06-29 PROBLEM — R00.1 BRADYCARDIA: Status: ACTIVE | Noted: 2020-06-29

## 2020-08-27 ENCOUNTER — APPOINTMENT (OUTPATIENT)
Dept: ELECTROPHYSIOLOGY | Facility: CLINIC | Age: 80
End: 2020-08-27

## 2020-10-07 NOTE — DIETITIAN INITIAL EVALUATION ADULT. - PROBLEM SELECTOR PROBLEM 6
Care Transition (HRTIC) Note    Chart reviewed by Care Transition (CT) RN for potential inclusion in the High Risk Transition in Care program. RN has determined patient is not a suitable candidate at this time.     Care Management outreach for this patient who was evaluated for a suspected COVID-19 infection or exposure was not completed because outreach is not appropriate r/t patient with covid >28 days, admit for surgery. .       Sleep Apnea

## 2020-12-14 ENCOUNTER — INPATIENT (INPATIENT)
Facility: HOSPITAL | Age: 80
LOS: 10 days | Discharge: HOME | End: 2020-12-25
Attending: SURGERY | Admitting: SURGERY
Payer: MEDICARE

## 2020-12-14 VITALS
RESPIRATION RATE: 18 BRPM | OXYGEN SATURATION: 99 % | HEART RATE: 70 BPM | DIASTOLIC BLOOD PRESSURE: 60 MMHG | HEIGHT: 63 IN | TEMPERATURE: 99 F | SYSTOLIC BLOOD PRESSURE: 152 MMHG

## 2020-12-14 RX ORDER — PIPERACILLIN AND TAZOBACTAM 4; .5 G/20ML; G/20ML
3.38 INJECTION, POWDER, LYOPHILIZED, FOR SOLUTION INTRAVENOUS ONCE
Refills: 0 | Status: COMPLETED | OUTPATIENT
Start: 2020-12-14 | End: 2020-12-14

## 2020-12-14 RX ORDER — MORPHINE SULFATE 50 MG/1
4 CAPSULE, EXTENDED RELEASE ORAL ONCE
Refills: 0 | Status: DISCONTINUED | OUTPATIENT
Start: 2020-12-14 | End: 2020-12-14

## 2020-12-14 RX ORDER — SODIUM CHLORIDE 9 MG/ML
1000 INJECTION INTRAMUSCULAR; INTRAVENOUS; SUBCUTANEOUS ONCE
Refills: 0 | Status: COMPLETED | OUTPATIENT
Start: 2020-12-14 | End: 2020-12-14

## 2020-12-14 RX ORDER — SODIUM CHLORIDE 9 MG/ML
1000 INJECTION, SOLUTION INTRAVENOUS ONCE
Refills: 0 | Status: COMPLETED | OUTPATIENT
Start: 2020-12-14 | End: 2020-12-14

## 2020-12-14 RX ADMIN — SODIUM CHLORIDE 1000 MILLILITER(S): 9 INJECTION INTRAMUSCULAR; INTRAVENOUS; SUBCUTANEOUS at 23:39

## 2020-12-14 RX ADMIN — MORPHINE SULFATE 4 MILLIGRAM(S): 50 CAPSULE, EXTENDED RELEASE ORAL at 23:39

## 2020-12-14 RX ADMIN — PIPERACILLIN AND TAZOBACTAM 200 GRAM(S): 4; .5 INJECTION, POWDER, LYOPHILIZED, FOR SOLUTION INTRAVENOUS at 23:39

## 2020-12-14 NOTE — ED PROVIDER NOTE - ATTENDING CONTRIBUTION TO CARE
DR. BLOCH, ATTENDING MD-  I performed a face to face bedside interview with patient regarding history of present illness, review of symptoms and past medical history. I completed an independent physical exam.  I have discussed patient's plan of care with the resident.  patient mildly ill appearing in pain, HEENT no jaundice, heart s1s2, lungs clear abd distended, rash on lower abd on left, very tender to palpation no bowel sounds. DR. BLOCH, ATTENDING MD-  I performed a face to face bedside interview with patient regarding history of present illness, review of symptoms and past medical history. I completed an independent physical exam.  I have discussed patient's plan of care with the resident.  patient mildly ill appearing in pain, HEENT no jaundice, heart s1s2, lungs clear abd distended, rash on lower abd on left, very tender to palpation no bowel sounds

## 2020-12-14 NOTE — ED PROVIDER NOTE - CLINICAL SUMMARY MEDICAL DECISION MAKING FREE TEXT BOX
79yo with numerous comorbidities and numerous abdominal surgeries presenting with complaints of abdominal pain. being treated for diverticulitis, outpatient CT at [facility] shower perforation at previous sigmoidectomy anastomosis site. will obtain pre op labs, analgesia, and surgery consult.

## 2020-12-14 NOTE — ED ADULT NURSE NOTE - OBJECTIVE STATEMENT
Rodríguez RN: Patient received to room 27, A&Ox4, c/o abdominal pain since Friday. Pt. had CT outpatient today which showed small bowel perforation. Pt has history of diverticulitis and sigmoidectomy. Started on ABX today. Reports nausea, no vomiting/diarrhea. Pain worst in LLQ, tender on palpation. Denies CP/SOB. Arrives with 22G to L arm from EMS. 20G IV placed to R AC, labs drawn and sent. MD at bedside.

## 2020-12-14 NOTE — ED PROVIDER NOTE - OBJECTIVE STATEMENT
79yo F Hx of 79yo F Hx of DM, OA, Hypothyroid, Diverticulitis with multiple abdominal surgeries in the past, Hiatal Hernia, PREMA not on CPAP, Spinal Stenosis L/S, HLD presenting with complaints of abdominal pain. has been being treated with cipro over the past week, started on flagyl today for presumed diverticulitis. had CTAP done at [facility] and was sent to the ED for bowel perforation at site of anastomosis from prior sigmoidectomy. nausea, no vomiting. no bloody stools, LE edema.

## 2020-12-14 NOTE — ED ADULT TRIAGE NOTE - CHIEF COMPLAINT QUOTE
pt brought in by EMS today w nausea and abdominal pain. had CT today outpatient showing small bowel perforation. pt had a sigmoidectomy ~10 years ago, perf is at site of anastomosis. pt CT also showing Diverticulitis. has been on Cipro since Saturday and started Flagyl Today. pt also on Xarelto- PMHx- PPM, HTN, HLD, DM, Anxiety, Hypothyroidism, Afib, Macular Degeneration, Diverticulosis. pt had negative covid swab on Saturday. Surgeon- Dr. Lyon. pt w 22G IV to L Arm by EMS, NS infusing by EMS.

## 2020-12-15 DIAGNOSIS — K63.1 PERFORATION OF INTESTINE (NONTRAUMATIC): ICD-10-CM

## 2020-12-15 PROBLEM — Z12.11 ENCOUNTER FOR SCREENING COLONOSCOPY: Status: RESOLVED | Noted: 2017-05-10 | Resolved: 2020-12-15

## 2020-12-15 LAB
ALBUMIN SERPL ELPH-MCNC: 3.8 G/DL — SIGNIFICANT CHANGE UP (ref 3.3–5)
ALP SERPL-CCNC: 119 U/L — SIGNIFICANT CHANGE UP (ref 40–120)
ALT FLD-CCNC: 66 U/L — HIGH (ref 4–33)
ANION GAP SERPL CALC-SCNC: 10 MMOL/L — SIGNIFICANT CHANGE UP (ref 7–14)
ANION GAP SERPL CALC-SCNC: 14 MMOL/L — SIGNIFICANT CHANGE UP (ref 7–14)
APPEARANCE UR: CLEAR — SIGNIFICANT CHANGE UP
APTT BLD: 35.9 SEC — SIGNIFICANT CHANGE UP (ref 27–36.3)
APTT BLD: 42.7 SEC — HIGH (ref 27–36.3)
AST SERPL-CCNC: 51 U/L — HIGH (ref 4–32)
BACTERIA # UR AUTO: NEGATIVE — SIGNIFICANT CHANGE UP
BASOPHILS # BLD AUTO: 0.02 K/UL — SIGNIFICANT CHANGE UP (ref 0–0.2)
BASOPHILS NFR BLD AUTO: 0.2 % — SIGNIFICANT CHANGE UP (ref 0–2)
BILIRUB SERPL-MCNC: 0.8 MG/DL — SIGNIFICANT CHANGE UP (ref 0.2–1.2)
BILIRUB UR-MCNC: NEGATIVE — SIGNIFICANT CHANGE UP
BLD GP AB SCN SERPL QL: NEGATIVE — SIGNIFICANT CHANGE UP
BLOOD GAS VENOUS COMPREHENSIVE RESULT: SIGNIFICANT CHANGE UP
BUN SERPL-MCNC: 12 MG/DL — SIGNIFICANT CHANGE UP (ref 7–23)
BUN SERPL-MCNC: 14 MG/DL — SIGNIFICANT CHANGE UP (ref 7–23)
CALCIUM SERPL-MCNC: 8.9 MG/DL — SIGNIFICANT CHANGE UP (ref 8.4–10.5)
CALCIUM SERPL-MCNC: 9.6 MG/DL — SIGNIFICANT CHANGE UP (ref 8.4–10.5)
CHLORIDE SERPL-SCNC: 106 MMOL/L — SIGNIFICANT CHANGE UP (ref 98–107)
CHLORIDE SERPL-SCNC: 96 MMOL/L — LOW (ref 98–107)
CO2 SERPL-SCNC: 23 MMOL/L — SIGNIFICANT CHANGE UP (ref 22–31)
CO2 SERPL-SCNC: 24 MMOL/L — SIGNIFICANT CHANGE UP (ref 22–31)
COLOR SPEC: YELLOW — SIGNIFICANT CHANGE UP
CREAT SERPL-MCNC: 0.62 MG/DL — SIGNIFICANT CHANGE UP (ref 0.5–1.3)
CREAT SERPL-MCNC: 0.75 MG/DL — SIGNIFICANT CHANGE UP (ref 0.5–1.3)
CULTURE RESULTS: SIGNIFICANT CHANGE UP
DIFF PNL FLD: NEGATIVE — SIGNIFICANT CHANGE UP
EOSINOPHIL # BLD AUTO: 0.07 K/UL — SIGNIFICANT CHANGE UP (ref 0–0.5)
EOSINOPHIL NFR BLD AUTO: 0.8 % — SIGNIFICANT CHANGE UP (ref 0–6)
EPI CELLS # UR: 1 /HPF — SIGNIFICANT CHANGE UP (ref 0–5)
GLUCOSE BLDC GLUCOMTR-MCNC: 107 MG/DL — HIGH (ref 70–99)
GLUCOSE BLDC GLUCOMTR-MCNC: 121 MG/DL — HIGH (ref 70–99)
GLUCOSE BLDC GLUCOMTR-MCNC: 149 MG/DL — HIGH (ref 70–99)
GLUCOSE SERPL-MCNC: 114 MG/DL — HIGH (ref 70–99)
GLUCOSE SERPL-MCNC: 123 MG/DL — HIGH (ref 70–99)
GLUCOSE UR QL: NEGATIVE — SIGNIFICANT CHANGE UP
HCT VFR BLD CALC: 34.6 % — SIGNIFICANT CHANGE UP (ref 34.5–45)
HCT VFR BLD CALC: 42.8 % — SIGNIFICANT CHANGE UP (ref 34.5–45)
HGB BLD-MCNC: 11 G/DL — LOW (ref 11.5–15.5)
HGB BLD-MCNC: 13.4 G/DL — SIGNIFICANT CHANGE UP (ref 11.5–15.5)
HYALINE CASTS # UR AUTO: 1 /LPF — SIGNIFICANT CHANGE UP (ref 0–7)
IANC: 7.72 K/UL — SIGNIFICANT CHANGE UP (ref 1.5–8.5)
IMM GRANULOCYTES NFR BLD AUTO: 0.6 % — SIGNIFICANT CHANGE UP (ref 0–1.5)
INR BLD: 1.87 RATIO — HIGH (ref 0.88–1.17)
INR BLD: 3.32 RATIO — HIGH (ref 0.88–1.17)
KETONES UR-MCNC: ABNORMAL
LEUKOCYTE ESTERASE UR-ACNC: NEGATIVE — SIGNIFICANT CHANGE UP
LYMPHOCYTES # BLD AUTO: 0.67 K/UL — LOW (ref 1–3.3)
LYMPHOCYTES # BLD AUTO: 7.7 % — LOW (ref 13–44)
MAGNESIUM SERPL-MCNC: 1.9 MG/DL — SIGNIFICANT CHANGE UP (ref 1.6–2.6)
MCHC RBC-ENTMCNC: 27.3 PG — SIGNIFICANT CHANGE UP (ref 27–34)
MCHC RBC-ENTMCNC: 27.7 PG — SIGNIFICANT CHANGE UP (ref 27–34)
MCHC RBC-ENTMCNC: 31.3 GM/DL — LOW (ref 32–36)
MCHC RBC-ENTMCNC: 31.8 GM/DL — LOW (ref 32–36)
MCV RBC AUTO: 87.2 FL — SIGNIFICANT CHANGE UP (ref 80–100)
MCV RBC AUTO: 87.2 FL — SIGNIFICANT CHANGE UP (ref 80–100)
MONOCYTES # BLD AUTO: 0.2 K/UL — SIGNIFICANT CHANGE UP (ref 0–0.9)
MONOCYTES NFR BLD AUTO: 2.3 % — SIGNIFICANT CHANGE UP (ref 2–14)
NEUTROPHILS # BLD AUTO: 7.72 K/UL — HIGH (ref 1.8–7.4)
NEUTROPHILS NFR BLD AUTO: 88.4 % — HIGH (ref 43–77)
NITRITE UR-MCNC: NEGATIVE — SIGNIFICANT CHANGE UP
NRBC # BLD: 0 /100 WBCS — SIGNIFICANT CHANGE UP
NRBC # BLD: 0 /100 WBCS — SIGNIFICANT CHANGE UP
NRBC # FLD: 0 K/UL — SIGNIFICANT CHANGE UP
NRBC # FLD: 0 K/UL — SIGNIFICANT CHANGE UP
PH UR: 6 — SIGNIFICANT CHANGE UP (ref 5–8)
PHOSPHATE SERPL-MCNC: 2.5 MG/DL — SIGNIFICANT CHANGE UP (ref 2.5–4.5)
PLATELET # BLD AUTO: 223 K/UL — SIGNIFICANT CHANGE UP (ref 150–400)
PLATELET # BLD AUTO: 238 K/UL — SIGNIFICANT CHANGE UP (ref 150–400)
POTASSIUM SERPL-MCNC: 3.7 MMOL/L — SIGNIFICANT CHANGE UP (ref 3.5–5.3)
POTASSIUM SERPL-MCNC: 3.8 MMOL/L — SIGNIFICANT CHANGE UP (ref 3.5–5.3)
POTASSIUM SERPL-SCNC: 3.7 MMOL/L — SIGNIFICANT CHANGE UP (ref 3.5–5.3)
POTASSIUM SERPL-SCNC: 3.8 MMOL/L — SIGNIFICANT CHANGE UP (ref 3.5–5.3)
PROT SERPL-MCNC: 7.7 G/DL — SIGNIFICANT CHANGE UP (ref 6–8.3)
PROT UR-MCNC: ABNORMAL
PROTHROM AB SERPL-ACNC: 20.9 SEC — HIGH (ref 9.8–13.1)
PROTHROM AB SERPL-ACNC: 36.1 SEC — HIGH (ref 9.8–13.1)
RBC # BLD: 3.97 M/UL — SIGNIFICANT CHANGE UP (ref 3.8–5.2)
RBC # BLD: 4.91 M/UL — SIGNIFICANT CHANGE UP (ref 3.8–5.2)
RBC # FLD: 13.3 % — SIGNIFICANT CHANGE UP (ref 10.3–14.5)
RBC # FLD: 13.5 % — SIGNIFICANT CHANGE UP (ref 10.3–14.5)
RBC CASTS # UR COMP ASSIST: 1 /HPF — SIGNIFICANT CHANGE UP (ref 0–4)
RH IG SCN BLD-IMP: NEGATIVE — SIGNIFICANT CHANGE UP
SARS-COV-2 IGG SERPL QL IA: NEGATIVE — SIGNIFICANT CHANGE UP
SARS-COV-2 IGM SERPL IA-ACNC: <0.1 INDEX — SIGNIFICANT CHANGE UP
SARS-COV-2 RNA SPEC QL NAA+PROBE: SIGNIFICANT CHANGE UP
SODIUM SERPL-SCNC: 134 MMOL/L — LOW (ref 135–145)
SODIUM SERPL-SCNC: 139 MMOL/L — SIGNIFICANT CHANGE UP (ref 135–145)
SP GR SPEC: 1.03 — HIGH (ref 1.01–1.02)
SPECIMEN SOURCE: SIGNIFICANT CHANGE UP
UROBILINOGEN FLD QL: ABNORMAL
WBC # BLD: 10.85 K/UL — HIGH (ref 3.8–10.5)
WBC # BLD: 8.73 K/UL — SIGNIFICANT CHANGE UP (ref 3.8–10.5)
WBC # FLD AUTO: 10.85 K/UL — HIGH (ref 3.8–10.5)
WBC # FLD AUTO: 8.73 K/UL — SIGNIFICANT CHANGE UP (ref 3.8–10.5)
WBC UR QL: 3 /HPF — SIGNIFICANT CHANGE UP (ref 0–5)

## 2020-12-15 PROCEDURE — 71045 X-RAY EXAM CHEST 1 VIEW: CPT | Mod: 26

## 2020-12-15 PROCEDURE — 99285 EMERGENCY DEPT VISIT HI MDM: CPT

## 2020-12-15 PROCEDURE — 74177 CT ABD & PELVIS W/CONTRAST: CPT | Mod: 26

## 2020-12-15 PROCEDURE — 99222 1ST HOSP IP/OBS MODERATE 55: CPT | Mod: GC

## 2020-12-15 RX ORDER — SODIUM CHLORIDE 9 MG/ML
1000 INJECTION, SOLUTION INTRAVENOUS
Refills: 0 | Status: DISCONTINUED | OUTPATIENT
Start: 2020-12-15 | End: 2020-12-21

## 2020-12-15 RX ORDER — ATORVASTATIN CALCIUM 80 MG/1
20 TABLET, FILM COATED ORAL AT BEDTIME
Refills: 0 | Status: DISCONTINUED | OUTPATIENT
Start: 2020-12-15 | End: 2020-12-18

## 2020-12-15 RX ORDER — ENOXAPARIN SODIUM 100 MG/ML
40 INJECTION SUBCUTANEOUS DAILY
Refills: 0 | Status: DISCONTINUED | OUTPATIENT
Start: 2020-12-15 | End: 2020-12-25

## 2020-12-15 RX ORDER — METOPROLOL TARTRATE 50 MG
50 TABLET ORAL DAILY
Refills: 0 | Status: DISCONTINUED | OUTPATIENT
Start: 2020-12-15 | End: 2020-12-18

## 2020-12-15 RX ORDER — DEXTROSE 50 % IN WATER 50 %
15 SYRINGE (ML) INTRAVENOUS ONCE
Refills: 0 | Status: DISCONTINUED | OUTPATIENT
Start: 2020-12-15 | End: 2020-12-25

## 2020-12-15 RX ORDER — LEVOTHYROXINE SODIUM 125 MCG
1 TABLET ORAL
Qty: 0 | Refills: 0 | DISCHARGE

## 2020-12-15 RX ORDER — DEXTROSE 50 % IN WATER 50 %
25 SYRINGE (ML) INTRAVENOUS ONCE
Refills: 0 | Status: DISCONTINUED | OUTPATIENT
Start: 2020-12-15 | End: 2020-12-25

## 2020-12-15 RX ORDER — INSULIN LISPRO 100/ML
VIAL (ML) SUBCUTANEOUS EVERY 6 HOURS
Refills: 0 | Status: DISCONTINUED | OUTPATIENT
Start: 2020-12-15 | End: 2020-12-24

## 2020-12-15 RX ORDER — PIPERACILLIN AND TAZOBACTAM 4; .5 G/20ML; G/20ML
3.38 INJECTION, POWDER, LYOPHILIZED, FOR SOLUTION INTRAVENOUS EVERY 8 HOURS
Refills: 0 | Status: COMPLETED | OUTPATIENT
Start: 2020-12-15 | End: 2020-12-22

## 2020-12-15 RX ORDER — ESCITALOPRAM OXALATE 10 MG/1
5 TABLET, FILM COATED ORAL DAILY
Refills: 0 | Status: DISCONTINUED | OUTPATIENT
Start: 2020-12-15 | End: 2020-12-18

## 2020-12-15 RX ORDER — GLUCAGON INJECTION, SOLUTION 0.5 MG/.1ML
1 INJECTION, SOLUTION SUBCUTANEOUS ONCE
Refills: 0 | Status: DISCONTINUED | OUTPATIENT
Start: 2020-12-15 | End: 2020-12-25

## 2020-12-15 RX ORDER — LEVOTHYROXINE SODIUM 125 MCG
100 TABLET ORAL DAILY
Refills: 0 | Status: DISCONTINUED | OUTPATIENT
Start: 2020-12-15 | End: 2020-12-18

## 2020-12-15 RX ORDER — DEXTROSE 50 % IN WATER 50 %
12.5 SYRINGE (ML) INTRAVENOUS ONCE
Refills: 0 | Status: DISCONTINUED | OUTPATIENT
Start: 2020-12-15 | End: 2020-12-25

## 2020-12-15 RX ORDER — MAGNESIUM SULFATE 500 MG/ML
2 VIAL (ML) INJECTION ONCE
Refills: 0 | Status: COMPLETED | OUTPATIENT
Start: 2020-12-15 | End: 2020-12-15

## 2020-12-15 RX ORDER — ACETAMINOPHEN 500 MG
1000 TABLET ORAL ONCE
Refills: 0 | Status: COMPLETED | OUTPATIENT
Start: 2020-12-15 | End: 2020-12-15

## 2020-12-15 RX ORDER — SODIUM CHLORIDE 9 MG/ML
1000 INJECTION, SOLUTION INTRAVENOUS
Refills: 0 | Status: DISCONTINUED | OUTPATIENT
Start: 2020-12-15 | End: 2020-12-16

## 2020-12-15 RX ADMIN — Medication 50 GRAM(S): at 13:19

## 2020-12-15 RX ADMIN — SODIUM CHLORIDE 100 MILLILITER(S): 9 INJECTION, SOLUTION INTRAVENOUS at 05:52

## 2020-12-15 RX ADMIN — Medication 400 MILLIGRAM(S): at 12:24

## 2020-12-15 RX ADMIN — SODIUM CHLORIDE 1000 MILLILITER(S): 9 INJECTION, SOLUTION INTRAVENOUS at 00:35

## 2020-12-15 RX ADMIN — Medication 63.75 MILLIMOLE(S): at 13:22

## 2020-12-15 RX ADMIN — Medication 1000 MILLIGRAM(S): at 12:40

## 2020-12-15 RX ADMIN — Medication 400 MILLIGRAM(S): at 22:01

## 2020-12-15 RX ADMIN — ATORVASTATIN CALCIUM 20 MILLIGRAM(S): 80 TABLET, FILM COATED ORAL at 22:00

## 2020-12-15 RX ADMIN — Medication 100 MICROGRAM(S): at 05:52

## 2020-12-15 RX ADMIN — PIPERACILLIN AND TAZOBACTAM 25 GRAM(S): 4; .5 INJECTION, POWDER, LYOPHILIZED, FOR SOLUTION INTRAVENOUS at 08:40

## 2020-12-15 RX ADMIN — MORPHINE SULFATE 4 MILLIGRAM(S): 50 CAPSULE, EXTENDED RELEASE ORAL at 00:35

## 2020-12-15 RX ADMIN — SODIUM CHLORIDE 100 MILLILITER(S): 9 INJECTION, SOLUTION INTRAVENOUS at 13:19

## 2020-12-15 RX ADMIN — SODIUM CHLORIDE 100 MILLILITER(S): 9 INJECTION, SOLUTION INTRAVENOUS at 22:01

## 2020-12-15 RX ADMIN — ENOXAPARIN SODIUM 40 MILLIGRAM(S): 100 INJECTION SUBCUTANEOUS at 17:33

## 2020-12-15 RX ADMIN — ESCITALOPRAM OXALATE 5 MILLIGRAM(S): 10 TABLET, FILM COATED ORAL at 12:25

## 2020-12-15 RX ADMIN — PIPERACILLIN AND TAZOBACTAM 25 GRAM(S): 4; .5 INJECTION, POWDER, LYOPHILIZED, FOR SOLUTION INTRAVENOUS at 17:32

## 2020-12-15 NOTE — H&P ADULT - NSHPPHYSICALEXAM_GEN_ALL_CORE
NAD, resting in stretcher  Alert, responding appropriately  Non labored respirations  Soft, focally tender in left abdomen, ND, no rebound/guarding  WWP

## 2020-12-15 NOTE — PATIENT PROFILE ADULT - FUNCTIONAL SCREEN CURRENT LEVEL: SWALLOWING (IF SCORE 2 OR MORE FOR ANY ITEM, CONSULT REHAB SERVICES), MLM)
0 = swallows foods/liquids without difficulty Complex Repair And A-T Advancement Flap Text: The defect edges were debeveled with a #15 scalpel blade.  The primary defect was closed partially with a complex linear closure.  Given the location of the remaining defect, shape of the defect and the proximity to free margins an A-T advancement flap was deemed most appropriate for complete closure of the defect.  Using a sterile surgical marker, an appropriate advancement flap was drawn incorporating the defect and placing the expected incisions within the relaxed skin tension lines where possible.    The area thus outlined was incised deep to adipose tissue with a #15 scalpel blade.  The skin margins were undermined to an appropriate distance in all directions utilizing iris scissors.

## 2020-12-15 NOTE — H&P ADULT - HISTORY OF PRESENT ILLNESS
80F hx DM2, OA, hypothyroid, cholecystectomy, VHR, jejunal diverticulitis s/p SBR (2012), HTN, HLD, Afib on Xarelto presents with abdominal pain x4 days. Patient reports pain similar to previous episodes of diverticulitis beginning last Friday. She called her PCP on Saturday and was prescribed Cipro/Flagyl and advised to only drink clears. She went for an outpatient CT earlier today which showed a perforation at the site of the anastomosis. She came to the ED for further evaluation. Denies fevers/chills, vomiting, dysuria, reports nausea.

## 2020-12-15 NOTE — H&P ADULT - NSHPLABSRESULTS_GEN_ALL_CORE
13.4   8.73  )-----------( 238      ( 14 Dec 2020 23:48 )             42.8     12-14    134<L>  |  96<L>  |  14  ----------------------------<  123<H>  3.8   |  24  |  0.75    Ca    9.6      14 Dec 2020 23:48    TPro  7.7  /  Alb  3.8  /  TBili  0.8  /  DBili  x   /  AST  51<H>  /  ALT  66<H>  /  AlkPhos  119  12-14      < from: CT Abdomen and Pelvis w/ IV Cont (12.15.20 @ 03:18) >    EXAM:  CT ABDOMEN AND PELVIS IC        PROCEDURE DATE:  Dec 15 2020         INTERPRETATION:  CLINICAL INFORMATION: Abdominal pain Bowel perforation noted on outside imaging.    COMPARISON: CT abdomen and pelvis 6/10/2020    PROCEDURE:  CT of the Abdomen and Pelvis was performed with intravenous contrast.  Intravenous contrast: 90 ml Omnipaque 350. 10 ml discarded.  Oral contrast: positive contrast was administered.  Sagittal and coronal reformats were performed.    FINDINGS:  LOWER CHEST: Bibasilar subsegmental atelectasis. Partially imaged cardiac pacer.    LIVER: Subcentimeter hypodense left hepatic lobe foci, unchanged.  BILE DUCTS: Normal caliber.  GALLBLADDER: Cholecystectomy.  SPLEEN: Within normal limits.  PANCREAS: Within normal limits.  ADRENALS: Within normal limits.  KIDNEYS/URETERS: Right cyst. No hydronephrosis.    BLADDER: Tinoco catheter.  REPRODUCTIVE ORGANS: Hysterectomy.    BOWEL/PERITONEUM: Tiny hiatal hernia.  Left abdominal jejunal anastomosis. There is extensive inflammation surrounding the anastomosed bowel. Extensive small bowel diverticulosis in this region as well. Extraluminal gas and liquid consistent with perforation. Partially walled off collection measures 5.0 x 3.2 cm.. Diverticulosis coli.  VESSELS: Atherosclerotic changes.  RETROPERITONEUM/LYMPH NODES: No lymphadenopathy.  ABDOMINAL WALL: Within normal limits.  BONES: Degenerative changes.    IMPRESSION:  Perforated small bowel in the left abdomen with partially walled off fluid collection. Differential includes perforated small bowel diverticulitis versus anastomotic breakdown given the location of the perforation..    ROSITA PROCTOR MD; Resident Interventional Radiology  This document has been electronically signed.  MO ALLEN MD; Attending Radiologist  This document has been electronically signed. Dec 15 2020  4:20AM    < end of copied text >

## 2020-12-15 NOTE — H&P ADULT - ASSESSMENT
80F hx DM2, OA, hypothyroid, cholecystectomy, VHR, jejunal diverticulitis s/p SBR (2012), HTN, HLD, Afib on Xarelto presents with abdominal pain x4 days found to have a contained SB perforation vs. anastomotic leak    - Admit to Surgery under Dr. Lyon  - Will attempt non-operative management at this time; however if clinically worsens may require operative exploration  - NPO / IV hydration / Zosyn  - IR consultation for drainage in AM  - Home meds  - Insulin sliding scale  - Pain control as needed  - VTE ppx: holding, patient last took Xarelto in AM; ambulation    To be d/w Dr. Lyon in AM    KAITY Schneider, PGY-3  General Surgery (A Team)  [id number] with questions  80F hx DM2, OA, hypothyroid, cholecystectomy, VHR, jejunal diverticulitis s/p SBR (2012), HTN, HLD, Afib on Xarelto presents with abdominal pain x4 days found to have a contained SB perforation vs. anastomotic leak    - Admit to Surgery under Dr. Lyon  - Will attempt non-operative management at this time; however if clinically worsens may require operative exploration  - NPO / IV hydration / Zosyn  - IR consultation for drainage in AM  - Home meds  - Insulin sliding scale  - Pain control as needed  - VTE ppx: holding, patient last took Xarelto in AM (may require reversal if IR intervention); ambulation    To be d/w Dr. Lyon in AM    KAITY Schneider, PGY-3  General Surgery (A Team)  [id number] with questions  80F hx DM2, OA, hypothyroid, cholecystectomy, VHR, jejunal diverticulitis s/p SBR (2012), HTN, HLD, Afib on Xarelto presents with abdominal pain x4 days found to have a contained SB perforation vs. anastomotic leak    - Admit to Surgery under Dr. Lyon  - Will attempt non-operative management at this time; however if clinically worsens may require operative exploration  - NPO / IV hydration / Zosyn  - IR consultation for drainage in AM  - Home meds  - Insulin sliding scale  - Pain control as needed  - VTE ppx: holding, patient last took Xarelto in AM (may require reversal if IR intervention); ambulation    d/w Dr. Lyon in AM    KAITY Schneider, PGY-3  General Surgery (A Team)  [id number] with questions

## 2020-12-15 NOTE — CONSULT NOTE ADULT - SUBJECTIVE AND OBJECTIVE BOX
Vascular & Interventional Radiology Brief Consult Note    Evaluate for Procedure: drainage of collection adjacent to small bowel anastomosis     HPI: 80y Female with history of small bowel resection presenting with abdominal pain and abdominal collection concerning for anastomotic leak.     Allergies:   Medications (Abx/Cardiac/Anticoagulation/Blood Products)    piperacillin/tazobactam IVPB.: 200 mL/Hr IV Intermittent (12-14 @ 23:39)  piperacillin/tazobactam IVPB..: 25 mL/Hr IV Intermittent (12-15 @ 08:40)    Data:  160  T(C): 36.9  HR: 60  BP: 134/40  RR: 17  SpO2: 98%    -WBC 8.73 / HgB 13.4 / Hct 42.8 / Plt 238  -Na 134 / Cl 96 / BUN 14 / Glucose 123  -K 3.8 / CO2 24 / Cr 0.75  -ALT 66 / Alk Phos 119 / T.Bili 0.8  -INR 3.32 / PTT 42.7    Imaging: CT A/P 12/14/20 small non walled off collection adjacent to small bowel anastomosis     Assessment/Plan:   HPI: 80y Female with history of small bowel resection presenting with abdominal pain and abdominal collection concerning for anastomotic leak.     - Collection does not appear walled off/amendable to IR drainage at this time.   - Recommend conservative treatment and follow up imaging as clinically indicated in couple days

## 2020-12-16 LAB
ANION GAP SERPL CALC-SCNC: 11 MMOL/L — SIGNIFICANT CHANGE UP (ref 7–14)
BUN SERPL-MCNC: 7 MG/DL — SIGNIFICANT CHANGE UP (ref 7–23)
CALCIUM SERPL-MCNC: 9.5 MG/DL — SIGNIFICANT CHANGE UP (ref 8.4–10.5)
CHLORIDE SERPL-SCNC: 104 MMOL/L — SIGNIFICANT CHANGE UP (ref 98–107)
CO2 SERPL-SCNC: 25 MMOL/L — SIGNIFICANT CHANGE UP (ref 22–31)
CREAT SERPL-MCNC: 0.58 MG/DL — SIGNIFICANT CHANGE UP (ref 0.5–1.3)
GLUCOSE BLDC GLUCOMTR-MCNC: 106 MG/DL — HIGH (ref 70–99)
GLUCOSE BLDC GLUCOMTR-MCNC: 113 MG/DL — HIGH (ref 70–99)
GLUCOSE BLDC GLUCOMTR-MCNC: 117 MG/DL — HIGH (ref 70–99)
GLUCOSE BLDC GLUCOMTR-MCNC: 142 MG/DL — HIGH (ref 70–99)
GLUCOSE BLDC GLUCOMTR-MCNC: 156 MG/DL — HIGH (ref 70–99)
GLUCOSE SERPL-MCNC: 100 MG/DL — HIGH (ref 70–99)
HCT VFR BLD CALC: 41.5 % — SIGNIFICANT CHANGE UP (ref 34.5–45)
HGB BLD-MCNC: 13.3 G/DL — SIGNIFICANT CHANGE UP (ref 11.5–15.5)
MAGNESIUM SERPL-MCNC: 2.5 MG/DL — SIGNIFICANT CHANGE UP (ref 1.6–2.6)
MCHC RBC-ENTMCNC: 28.1 PG — SIGNIFICANT CHANGE UP (ref 27–34)
MCHC RBC-ENTMCNC: 32 GM/DL — SIGNIFICANT CHANGE UP (ref 32–36)
MCV RBC AUTO: 87.7 FL — SIGNIFICANT CHANGE UP (ref 80–100)
NRBC # BLD: 0 /100 WBCS — SIGNIFICANT CHANGE UP
NRBC # FLD: 0 K/UL — SIGNIFICANT CHANGE UP
PHOSPHATE SERPL-MCNC: 2.8 MG/DL — SIGNIFICANT CHANGE UP (ref 2.5–4.5)
PLATELET # BLD AUTO: 289 K/UL — SIGNIFICANT CHANGE UP (ref 150–400)
POTASSIUM SERPL-MCNC: 3.2 MMOL/L — LOW (ref 3.5–5.3)
POTASSIUM SERPL-SCNC: 3.2 MMOL/L — LOW (ref 3.5–5.3)
RBC # BLD: 4.73 M/UL — SIGNIFICANT CHANGE UP (ref 3.8–5.2)
RBC # FLD: 13.8 % — SIGNIFICANT CHANGE UP (ref 10.3–14.5)
SODIUM SERPL-SCNC: 140 MMOL/L — SIGNIFICANT CHANGE UP (ref 135–145)
WBC # BLD: 11.4 K/UL — HIGH (ref 3.8–10.5)
WBC # FLD AUTO: 11.4 K/UL — HIGH (ref 3.8–10.5)

## 2020-12-16 RX ORDER — SODIUM CHLORIDE 9 MG/ML
1000 INJECTION, SOLUTION INTRAVENOUS
Refills: 0 | Status: DISCONTINUED | OUTPATIENT
Start: 2020-12-16 | End: 2020-12-24

## 2020-12-16 RX ORDER — POTASSIUM CHLORIDE 20 MEQ
10 PACKET (EA) ORAL
Refills: 0 | Status: COMPLETED | OUTPATIENT
Start: 2020-12-16 | End: 2020-12-16

## 2020-12-16 RX ORDER — ACETAMINOPHEN 500 MG
1000 TABLET ORAL ONCE
Refills: 0 | Status: COMPLETED | OUTPATIENT
Start: 2020-12-16 | End: 2020-12-16

## 2020-12-16 RX ORDER — ACETAMINOPHEN 500 MG
1000 TABLET ORAL ONCE
Refills: 0 | Status: COMPLETED | OUTPATIENT
Start: 2020-12-16 | End: 2020-12-17

## 2020-12-16 RX ORDER — POTASSIUM PHOSPHATE, MONOBASIC POTASSIUM PHOSPHATE, DIBASIC 236; 224 MG/ML; MG/ML
15 INJECTION, SOLUTION INTRAVENOUS ONCE
Refills: 0 | Status: COMPLETED | OUTPATIENT
Start: 2020-12-16 | End: 2020-12-16

## 2020-12-16 RX ADMIN — POTASSIUM PHOSPHATE, MONOBASIC POTASSIUM PHOSPHATE, DIBASIC 62.5 MILLIMOLE(S): 236; 224 INJECTION, SOLUTION INTRAVENOUS at 13:27

## 2020-12-16 RX ADMIN — Medication 50 MILLIGRAM(S): at 05:13

## 2020-12-16 RX ADMIN — SODIUM CHLORIDE 100 MILLILITER(S): 9 INJECTION, SOLUTION INTRAVENOUS at 13:32

## 2020-12-16 RX ADMIN — Medication 1000 MILLIGRAM(S): at 16:55

## 2020-12-16 RX ADMIN — PIPERACILLIN AND TAZOBACTAM 25 GRAM(S): 4; .5 INJECTION, POWDER, LYOPHILIZED, FOR SOLUTION INTRAVENOUS at 15:36

## 2020-12-16 RX ADMIN — Medication 100 MICROGRAM(S): at 05:13

## 2020-12-16 RX ADMIN — PIPERACILLIN AND TAZOBACTAM 25 GRAM(S): 4; .5 INJECTION, POWDER, LYOPHILIZED, FOR SOLUTION INTRAVENOUS at 07:43

## 2020-12-16 RX ADMIN — Medication 1: at 18:40

## 2020-12-16 RX ADMIN — Medication 100 MILLIEQUIVALENT(S): at 12:11

## 2020-12-16 RX ADMIN — SODIUM CHLORIDE 100 MILLILITER(S): 9 INJECTION, SOLUTION INTRAVENOUS at 05:13

## 2020-12-16 RX ADMIN — Medication 400 MILLIGRAM(S): at 16:31

## 2020-12-16 RX ADMIN — ENOXAPARIN SODIUM 40 MILLIGRAM(S): 100 INJECTION SUBCUTANEOUS at 12:11

## 2020-12-16 RX ADMIN — Medication 100 MILLIEQUIVALENT(S): at 09:31

## 2020-12-16 RX ADMIN — ATORVASTATIN CALCIUM 20 MILLIGRAM(S): 80 TABLET, FILM COATED ORAL at 22:10

## 2020-12-16 RX ADMIN — PIPERACILLIN AND TAZOBACTAM 25 GRAM(S): 4; .5 INJECTION, POWDER, LYOPHILIZED, FOR SOLUTION INTRAVENOUS at 00:35

## 2020-12-16 RX ADMIN — Medication 1000 MILLIGRAM(S): at 11:30

## 2020-12-16 RX ADMIN — ESCITALOPRAM OXALATE 5 MILLIGRAM(S): 10 TABLET, FILM COATED ORAL at 12:11

## 2020-12-16 RX ADMIN — Medication 100 MILLIEQUIVALENT(S): at 15:11

## 2020-12-16 RX ADMIN — Medication 400 MILLIGRAM(S): at 10:29

## 2020-12-16 NOTE — PROGRESS NOTE ADULT - ASSESSMENT
80F hx DM2, OA, hypothyroid, cholecystectomy, VHR, jejunal diverticulitis s/p SBR (2012), HTN, HLD, Afib on Xarelto presents with abdominal pain x4 days found to have a contained SB perforation vs. anastomotic leak      - Will attempt non-operative management at this time; however if clinically worsens may require operative exploration  - NPO / IV hydration / Zosyn  - IR consulted: collection not amenable to drainage   - Home meds  - Insulin sliding scale  - Pain control as needed  - VTE ppx: holding, patient last took Xarelto in AM   - ambulation

## 2020-12-17 ENCOUNTER — TRANSCRIPTION ENCOUNTER (OUTPATIENT)
Age: 80
End: 2020-12-17

## 2020-12-17 LAB
ANION GAP SERPL CALC-SCNC: 10 MMOL/L — SIGNIFICANT CHANGE UP (ref 7–14)
BUN SERPL-MCNC: 5 MG/DL — LOW (ref 7–23)
CALCIUM SERPL-MCNC: 9.1 MG/DL — SIGNIFICANT CHANGE UP (ref 8.4–10.5)
CHLORIDE SERPL-SCNC: 106 MMOL/L — SIGNIFICANT CHANGE UP (ref 98–107)
CO2 SERPL-SCNC: 24 MMOL/L — SIGNIFICANT CHANGE UP (ref 22–31)
CREAT SERPL-MCNC: 0.61 MG/DL — SIGNIFICANT CHANGE UP (ref 0.5–1.3)
GLUCOSE BLDC GLUCOMTR-MCNC: 149 MG/DL — HIGH (ref 70–99)
GLUCOSE BLDC GLUCOMTR-MCNC: 154 MG/DL — HIGH (ref 70–99)
GLUCOSE SERPL-MCNC: 131 MG/DL — HIGH (ref 70–99)
HCT VFR BLD CALC: 33.5 % — LOW (ref 34.5–45)
HGB BLD-MCNC: 10.9 G/DL — LOW (ref 11.5–15.5)
MAGNESIUM SERPL-MCNC: 2.1 MG/DL — SIGNIFICANT CHANGE UP (ref 1.6–2.6)
MCHC RBC-ENTMCNC: 27.9 PG — SIGNIFICANT CHANGE UP (ref 27–34)
MCHC RBC-ENTMCNC: 32.5 GM/DL — SIGNIFICANT CHANGE UP (ref 32–36)
MCV RBC AUTO: 85.9 FL — SIGNIFICANT CHANGE UP (ref 80–100)
NRBC # BLD: 0 /100 WBCS — SIGNIFICANT CHANGE UP
NRBC # FLD: 0 K/UL — SIGNIFICANT CHANGE UP
PHOSPHATE SERPL-MCNC: 2.3 MG/DL — LOW (ref 2.5–4.5)
PLATELET # BLD AUTO: 283 K/UL — SIGNIFICANT CHANGE UP (ref 150–400)
POTASSIUM SERPL-MCNC: 3.9 MMOL/L — SIGNIFICANT CHANGE UP (ref 3.5–5.3)
POTASSIUM SERPL-SCNC: 3.9 MMOL/L — SIGNIFICANT CHANGE UP (ref 3.5–5.3)
RBC # BLD: 3.9 M/UL — SIGNIFICANT CHANGE UP (ref 3.8–5.2)
RBC # FLD: 14.1 % — SIGNIFICANT CHANGE UP (ref 10.3–14.5)
SODIUM SERPL-SCNC: 140 MMOL/L — SIGNIFICANT CHANGE UP (ref 135–145)
WBC # BLD: 9.53 K/UL — SIGNIFICANT CHANGE UP (ref 3.8–10.5)
WBC # FLD AUTO: 9.53 K/UL — SIGNIFICANT CHANGE UP (ref 3.8–10.5)

## 2020-12-17 PROCEDURE — 99232 SBSQ HOSP IP/OBS MODERATE 35: CPT | Mod: GC,57

## 2020-12-17 RX ORDER — HYDROMORPHONE HYDROCHLORIDE 2 MG/ML
0.25 INJECTION INTRAMUSCULAR; INTRAVENOUS; SUBCUTANEOUS ONCE
Refills: 0 | Status: DISCONTINUED | OUTPATIENT
Start: 2020-12-17 | End: 2020-12-17

## 2020-12-17 RX ORDER — ACETAMINOPHEN 500 MG
1000 TABLET ORAL ONCE
Refills: 0 | Status: COMPLETED | OUTPATIENT
Start: 2020-12-17 | End: 2020-12-17

## 2020-12-17 RX ADMIN — Medication 1: at 12:24

## 2020-12-17 RX ADMIN — PIPERACILLIN AND TAZOBACTAM 25 GRAM(S): 4; .5 INJECTION, POWDER, LYOPHILIZED, FOR SOLUTION INTRAVENOUS at 00:53

## 2020-12-17 RX ADMIN — Medication 1000 MILLIGRAM(S): at 16:01

## 2020-12-17 RX ADMIN — ESCITALOPRAM OXALATE 5 MILLIGRAM(S): 10 TABLET, FILM COATED ORAL at 15:51

## 2020-12-17 RX ADMIN — Medication 400 MILLIGRAM(S): at 15:46

## 2020-12-17 RX ADMIN — ATORVASTATIN CALCIUM 20 MILLIGRAM(S): 80 TABLET, FILM COATED ORAL at 21:36

## 2020-12-17 RX ADMIN — PIPERACILLIN AND TAZOBACTAM 25 GRAM(S): 4; .5 INJECTION, POWDER, LYOPHILIZED, FOR SOLUTION INTRAVENOUS at 15:46

## 2020-12-17 RX ADMIN — PIPERACILLIN AND TAZOBACTAM 25 GRAM(S): 4; .5 INJECTION, POWDER, LYOPHILIZED, FOR SOLUTION INTRAVENOUS at 08:01

## 2020-12-17 RX ADMIN — Medication 50 MILLIGRAM(S): at 05:23

## 2020-12-17 RX ADMIN — ENOXAPARIN SODIUM 40 MILLIGRAM(S): 100 INJECTION SUBCUTANEOUS at 15:51

## 2020-12-17 RX ADMIN — Medication 1000 MILLIGRAM(S): at 02:10

## 2020-12-17 RX ADMIN — Medication 400 MILLIGRAM(S): at 01:55

## 2020-12-17 RX ADMIN — Medication 100 MICROGRAM(S): at 05:23

## 2020-12-17 RX ADMIN — Medication 400 MILLIGRAM(S): at 07:46

## 2020-12-17 RX ADMIN — HYDROMORPHONE HYDROCHLORIDE 0.25 MILLIGRAM(S): 2 INJECTION INTRAMUSCULAR; INTRAVENOUS; SUBCUTANEOUS at 21:29

## 2020-12-17 RX ADMIN — HYDROMORPHONE HYDROCHLORIDE 0.25 MILLIGRAM(S): 2 INJECTION INTRAMUSCULAR; INTRAVENOUS; SUBCUTANEOUS at 20:04

## 2020-12-17 NOTE — PROGRESS NOTE ADULT - ASSESSMENT
80F hx DM2, OA, hypothyroid, cholecystectomy, VHR, jejunal diverticulitis s/p SBR (2012), HTN, HLD, Afib on Xarelto presents with abdominal pain x4 days found to have a contained SB perforation vs. anastomotic leak      -continue NPO with IVF  -Will preop and consent for OR tomorrow for ex lap possible SBR as pt failed none operative management   -continue abx w/ Zosyn  - IR consulted: collection not amenable to drainage   - Home meds  - Insulin sliding scale  - Pain control as needed  - VTE ppx: LVX    [id number]  A Team Surgery

## 2020-12-17 NOTE — PROVIDER CONTACT NOTE (OTHER) - SITUATION
Pt will elevated BP, waiting for team to put in order for medication
BP elevated at 1740, repeat /67

## 2020-12-18 ENCOUNTER — RESULT REVIEW (OUTPATIENT)
Age: 80
End: 2020-12-18

## 2020-12-18 LAB
ANION GAP SERPL CALC-SCNC: 10 MMOL/L — SIGNIFICANT CHANGE UP (ref 7–14)
APTT BLD: 32.5 SEC — SIGNIFICANT CHANGE UP (ref 27–36.3)
BLD GP AB SCN SERPL QL: NEGATIVE — SIGNIFICANT CHANGE UP
BUN SERPL-MCNC: 4 MG/DL — LOW (ref 7–23)
CALCIUM SERPL-MCNC: 9.2 MG/DL — SIGNIFICANT CHANGE UP (ref 8.4–10.5)
CHLORIDE SERPL-SCNC: 102 MMOL/L — SIGNIFICANT CHANGE UP (ref 98–107)
CO2 SERPL-SCNC: 25 MMOL/L — SIGNIFICANT CHANGE UP (ref 22–31)
CREAT SERPL-MCNC: 0.58 MG/DL — SIGNIFICANT CHANGE UP (ref 0.5–1.3)
GLUCOSE BLDC GLUCOMTR-MCNC: 117 MG/DL — HIGH (ref 70–99)
GLUCOSE BLDC GLUCOMTR-MCNC: 121 MG/DL — HIGH (ref 70–99)
GLUCOSE BLDC GLUCOMTR-MCNC: 150 MG/DL — HIGH (ref 70–99)
GLUCOSE BLDC GLUCOMTR-MCNC: 151 MG/DL — HIGH (ref 70–99)
GLUCOSE SERPL-MCNC: 139 MG/DL — HIGH (ref 70–99)
HCT VFR BLD CALC: 35.5 % — SIGNIFICANT CHANGE UP (ref 34.5–45)
HGB BLD-MCNC: 11.3 G/DL — LOW (ref 11.5–15.5)
INR BLD: 1.13 RATIO — SIGNIFICANT CHANGE UP (ref 0.88–1.17)
MAGNESIUM SERPL-MCNC: 1.9 MG/DL — SIGNIFICANT CHANGE UP (ref 1.6–2.6)
MCHC RBC-ENTMCNC: 27.6 PG — SIGNIFICANT CHANGE UP (ref 27–34)
MCHC RBC-ENTMCNC: 31.8 GM/DL — LOW (ref 32–36)
MCV RBC AUTO: 86.8 FL — SIGNIFICANT CHANGE UP (ref 80–100)
NRBC # BLD: 0 /100 WBCS — SIGNIFICANT CHANGE UP
NRBC # FLD: 0 K/UL — SIGNIFICANT CHANGE UP
PHOSPHATE SERPL-MCNC: 2.8 MG/DL — SIGNIFICANT CHANGE UP (ref 2.5–4.5)
PLATELET # BLD AUTO: 327 K/UL — SIGNIFICANT CHANGE UP (ref 150–400)
POTASSIUM SERPL-MCNC: 3.7 MMOL/L — SIGNIFICANT CHANGE UP (ref 3.5–5.3)
POTASSIUM SERPL-SCNC: 3.7 MMOL/L — SIGNIFICANT CHANGE UP (ref 3.5–5.3)
PROTHROM AB SERPL-ACNC: 12.9 SEC — SIGNIFICANT CHANGE UP (ref 9.8–13.1)
RBC # BLD: 4.09 M/UL — SIGNIFICANT CHANGE UP (ref 3.8–5.2)
RBC # FLD: 13.8 % — SIGNIFICANT CHANGE UP (ref 10.3–14.5)
RH IG SCN BLD-IMP: NEGATIVE — SIGNIFICANT CHANGE UP
SODIUM SERPL-SCNC: 137 MMOL/L — SIGNIFICANT CHANGE UP (ref 135–145)
WBC # BLD: 10.12 K/UL — SIGNIFICANT CHANGE UP (ref 3.8–10.5)
WBC # FLD AUTO: 10.12 K/UL — SIGNIFICANT CHANGE UP (ref 3.8–10.5)

## 2020-12-18 PROCEDURE — 71045 X-RAY EXAM CHEST 1 VIEW: CPT | Mod: 26

## 2020-12-18 PROCEDURE — 49203: CPT | Mod: GC

## 2020-12-18 PROCEDURE — 88307 TISSUE EXAM BY PATHOLOGIST: CPT | Mod: 26

## 2020-12-18 PROCEDURE — 44120 REMOVAL OF SMALL INTESTINE: CPT | Mod: GC

## 2020-12-18 RX ORDER — METOPROLOL TARTRATE 50 MG
5 TABLET ORAL EVERY 6 HOURS
Refills: 0 | Status: DISCONTINUED | OUTPATIENT
Start: 2020-12-18 | End: 2020-12-23

## 2020-12-18 RX ORDER — LEVOTHYROXINE SODIUM 125 MCG
75 TABLET ORAL AT BEDTIME
Refills: 0 | Status: DISCONTINUED | OUTPATIENT
Start: 2020-12-18 | End: 2020-12-24

## 2020-12-18 RX ORDER — ACETAMINOPHEN 500 MG
1000 TABLET ORAL ONCE
Refills: 0 | Status: COMPLETED | OUTPATIENT
Start: 2020-12-18 | End: 2020-12-18

## 2020-12-18 RX ORDER — POTASSIUM CHLORIDE 20 MEQ
10 PACKET (EA) ORAL
Refills: 0 | Status: COMPLETED | OUTPATIENT
Start: 2020-12-18 | End: 2020-12-18

## 2020-12-18 RX ADMIN — Medication 1000 MILLIGRAM(S): at 09:27

## 2020-12-18 RX ADMIN — Medication 50 MILLIGRAM(S): at 06:07

## 2020-12-18 RX ADMIN — Medication 5 MILLIGRAM(S): at 23:23

## 2020-12-18 RX ADMIN — Medication 1: at 12:18

## 2020-12-18 RX ADMIN — PIPERACILLIN AND TAZOBACTAM 25 GRAM(S): 4; .5 INJECTION, POWDER, LYOPHILIZED, FOR SOLUTION INTRAVENOUS at 07:43

## 2020-12-18 RX ADMIN — Medication 100 MILLIEQUIVALENT(S): at 15:40

## 2020-12-18 RX ADMIN — Medication 100 MICROGRAM(S): at 06:08

## 2020-12-18 RX ADMIN — ENOXAPARIN SODIUM 40 MILLIGRAM(S): 100 INJECTION SUBCUTANEOUS at 17:23

## 2020-12-18 RX ADMIN — PIPERACILLIN AND TAZOBACTAM 25 GRAM(S): 4; .5 INJECTION, POWDER, LYOPHILIZED, FOR SOLUTION INTRAVENOUS at 00:29

## 2020-12-18 RX ADMIN — Medication 100 MILLIEQUIVALENT(S): at 17:23

## 2020-12-18 RX ADMIN — Medication 400 MILLIGRAM(S): at 09:12

## 2020-12-18 RX ADMIN — Medication 100 MILLIEQUIVALENT(S): at 13:48

## 2020-12-18 NOTE — BRIEF OPERATIVE NOTE - OPERATION/FINDINGS
ex. lap, extensive lysis of adhesions, small bowel matted together, phlegmon identified in small bowel mesentry with associated abscess draining pus, two small bowel enterotomies made and repaired in two layers, small bowel resected and side to side staple anastomosis performed, small bowel ran through no further injuries found, hemostasis achieved

## 2020-12-18 NOTE — PROGRESS NOTE ADULT - ASSESSMENT
Patient is a 80 year old female with a PMHx of DM2, OA, hypothyroidism, cholecystectomy, VHR, jejunal diverticulitis (S/P small bowel resection 2012), HTN, HLD, AFib (on Xarelto) who presented with abdominal pain x4 days.  She was found to have a contained small bowel perforation vs. anastomotic leak.      PLAN:  - OR today  - NPO  - D5 + LR @100cc/hr  - Continue with IV Zosyn  - VTE prophylaxis with Lovenox subcutaneous      #[id number]  A Team Surgery

## 2020-12-18 NOTE — BRIEF OPERATIVE NOTE - NSICDXBRIEFPOSTOP_GEN_ALL_CORE_FT
POST-OP DIAGNOSIS:  Small bowel diverticular disease 18-Dec-2020 22:46:00  Daly Stone  Small bowel diverticular disease 18-Dec-2020 22:43:27  Daly Stone

## 2020-12-19 LAB
ANION GAP SERPL CALC-SCNC: 8 MMOL/L — SIGNIFICANT CHANGE UP (ref 7–14)
BUN SERPL-MCNC: 6 MG/DL — LOW (ref 7–23)
CALCIUM SERPL-MCNC: 9.3 MG/DL — SIGNIFICANT CHANGE UP (ref 8.4–10.5)
CHLORIDE SERPL-SCNC: 101 MMOL/L — SIGNIFICANT CHANGE UP (ref 98–107)
CO2 SERPL-SCNC: 30 MMOL/L — SIGNIFICANT CHANGE UP (ref 22–31)
CREAT SERPL-MCNC: 0.71 MG/DL — SIGNIFICANT CHANGE UP (ref 0.5–1.3)
GLUCOSE BLDC GLUCOMTR-MCNC: 149 MG/DL — HIGH (ref 70–99)
GLUCOSE BLDC GLUCOMTR-MCNC: 161 MG/DL — HIGH (ref 70–99)
GLUCOSE BLDC GLUCOMTR-MCNC: 186 MG/DL — HIGH (ref 70–99)
GLUCOSE BLDC GLUCOMTR-MCNC: 189 MG/DL — HIGH (ref 70–99)
GLUCOSE BLDC GLUCOMTR-MCNC: 204 MG/DL — HIGH (ref 70–99)
GLUCOSE SERPL-MCNC: 152 MG/DL — HIGH (ref 70–99)
GRAM STN FLD: SIGNIFICANT CHANGE UP
HCT VFR BLD CALC: 36.3 % — SIGNIFICANT CHANGE UP (ref 34.5–45)
HGB BLD-MCNC: 11.7 G/DL — SIGNIFICANT CHANGE UP (ref 11.5–15.5)
MAGNESIUM SERPL-MCNC: 1.7 MG/DL — SIGNIFICANT CHANGE UP (ref 1.6–2.6)
MCHC RBC-ENTMCNC: 28.1 PG — SIGNIFICANT CHANGE UP (ref 27–34)
MCHC RBC-ENTMCNC: 32.2 GM/DL — SIGNIFICANT CHANGE UP (ref 32–36)
MCV RBC AUTO: 87.3 FL — SIGNIFICANT CHANGE UP (ref 80–100)
NRBC # BLD: 0 /100 WBCS — SIGNIFICANT CHANGE UP
NRBC # FLD: 0 K/UL — SIGNIFICANT CHANGE UP
PHOSPHATE SERPL-MCNC: 2.7 MG/DL — SIGNIFICANT CHANGE UP (ref 2.5–4.5)
PLATELET # BLD AUTO: 378 K/UL — SIGNIFICANT CHANGE UP (ref 150–400)
POTASSIUM SERPL-MCNC: 4.4 MMOL/L — SIGNIFICANT CHANGE UP (ref 3.5–5.3)
POTASSIUM SERPL-SCNC: 4.4 MMOL/L — SIGNIFICANT CHANGE UP (ref 3.5–5.3)
RBC # BLD: 4.16 M/UL — SIGNIFICANT CHANGE UP (ref 3.8–5.2)
RBC # FLD: 13.9 % — SIGNIFICANT CHANGE UP (ref 10.3–14.5)
SODIUM SERPL-SCNC: 139 MMOL/L — SIGNIFICANT CHANGE UP (ref 135–145)
SPECIMEN SOURCE: SIGNIFICANT CHANGE UP
WBC # BLD: 13.58 K/UL — HIGH (ref 3.8–10.5)
WBC # FLD AUTO: 13.58 K/UL — HIGH (ref 3.8–10.5)

## 2020-12-19 PROCEDURE — 71045 X-RAY EXAM CHEST 1 VIEW: CPT | Mod: 26

## 2020-12-19 RX ORDER — ACETAMINOPHEN 500 MG
1000 TABLET ORAL EVERY 6 HOURS
Refills: 0 | Status: COMPLETED | OUTPATIENT
Start: 2020-12-19 | End: 2020-12-20

## 2020-12-19 RX ORDER — HYDROMORPHONE HYDROCHLORIDE 2 MG/ML
1 INJECTION INTRAMUSCULAR; INTRAVENOUS; SUBCUTANEOUS EVERY 4 HOURS
Refills: 0 | Status: DISCONTINUED | OUTPATIENT
Start: 2020-12-19 | End: 2020-12-24

## 2020-12-19 RX ORDER — HYDROMORPHONE HYDROCHLORIDE 2 MG/ML
0.5 INJECTION INTRAMUSCULAR; INTRAVENOUS; SUBCUTANEOUS EVERY 4 HOURS
Refills: 0 | Status: DISCONTINUED | OUTPATIENT
Start: 2020-12-19 | End: 2020-12-24

## 2020-12-19 RX ADMIN — Medication 1: at 12:50

## 2020-12-19 RX ADMIN — HYDROMORPHONE HYDROCHLORIDE 1 MILLIGRAM(S): 2 INJECTION INTRAMUSCULAR; INTRAVENOUS; SUBCUTANEOUS at 07:44

## 2020-12-19 RX ADMIN — HYDROMORPHONE HYDROCHLORIDE 1 MILLIGRAM(S): 2 INJECTION INTRAMUSCULAR; INTRAVENOUS; SUBCUTANEOUS at 03:15

## 2020-12-19 RX ADMIN — Medication 400 MILLIGRAM(S): at 18:59

## 2020-12-19 RX ADMIN — HYDROMORPHONE HYDROCHLORIDE 1 MILLIGRAM(S): 2 INJECTION INTRAMUSCULAR; INTRAVENOUS; SUBCUTANEOUS at 08:32

## 2020-12-19 RX ADMIN — Medication 75 MICROGRAM(S): at 21:55

## 2020-12-19 RX ADMIN — PIPERACILLIN AND TAZOBACTAM 25 GRAM(S): 4; .5 INJECTION, POWDER, LYOPHILIZED, FOR SOLUTION INTRAVENOUS at 18:59

## 2020-12-19 RX ADMIN — ENOXAPARIN SODIUM 40 MILLIGRAM(S): 100 INJECTION SUBCUTANEOUS at 12:49

## 2020-12-19 RX ADMIN — Medication 400 MILLIGRAM(S): at 12:49

## 2020-12-19 RX ADMIN — Medication 2: at 06:08

## 2020-12-19 RX ADMIN — PIPERACILLIN AND TAZOBACTAM 25 GRAM(S): 4; .5 INJECTION, POWDER, LYOPHILIZED, FOR SOLUTION INTRAVENOUS at 09:51

## 2020-12-19 RX ADMIN — Medication 5 MILLIGRAM(S): at 23:43

## 2020-12-19 RX ADMIN — HYDROMORPHONE HYDROCHLORIDE 1 MILLIGRAM(S): 2 INJECTION INTRAMUSCULAR; INTRAVENOUS; SUBCUTANEOUS at 14:40

## 2020-12-19 RX ADMIN — Medication 5 MILLIGRAM(S): at 05:50

## 2020-12-19 RX ADMIN — SODIUM CHLORIDE 100 MILLILITER(S): 9 INJECTION, SOLUTION INTRAVENOUS at 03:07

## 2020-12-19 RX ADMIN — HYDROMORPHONE HYDROCHLORIDE 1 MILLIGRAM(S): 2 INJECTION INTRAMUSCULAR; INTRAVENOUS; SUBCUTANEOUS at 22:25

## 2020-12-19 RX ADMIN — HYDROMORPHONE HYDROCHLORIDE 1 MILLIGRAM(S): 2 INJECTION INTRAMUSCULAR; INTRAVENOUS; SUBCUTANEOUS at 02:33

## 2020-12-19 RX ADMIN — PIPERACILLIN AND TAZOBACTAM 25 GRAM(S): 4; .5 INJECTION, POWDER, LYOPHILIZED, FOR SOLUTION INTRAVENOUS at 03:24

## 2020-12-19 RX ADMIN — HYDROMORPHONE HYDROCHLORIDE 1 MILLIGRAM(S): 2 INJECTION INTRAMUSCULAR; INTRAVENOUS; SUBCUTANEOUS at 14:25

## 2020-12-19 RX ADMIN — Medication 5 MILLIGRAM(S): at 12:49

## 2020-12-19 RX ADMIN — Medication 400 MILLIGRAM(S): at 05:50

## 2020-12-19 RX ADMIN — Medication 1: at 23:43

## 2020-12-19 RX ADMIN — Medication 1: at 01:03

## 2020-12-19 RX ADMIN — HYDROMORPHONE HYDROCHLORIDE 1 MILLIGRAM(S): 2 INJECTION INTRAMUSCULAR; INTRAVENOUS; SUBCUTANEOUS at 21:55

## 2020-12-19 RX ADMIN — SODIUM CHLORIDE 100 MILLILITER(S): 9 INJECTION, SOLUTION INTRAVENOUS at 09:52

## 2020-12-19 RX ADMIN — Medication 5 MILLIGRAM(S): at 18:59

## 2020-12-19 NOTE — PHYSICAL THERAPY INITIAL EVALUATION ADULT - GAIT DISTANCE, PT EVAL
5 feet x 5 reps (total 25 feet) distance limited due to NG tube suction, further distance to be assessed as feasible

## 2020-12-19 NOTE — PROGRESS NOTE ADULT - ASSESSMENT
Patient is a 80 year old female with a PMHx of DM2, OA, hypothyroidism, cholecystectomy, VHR, jejunal diverticulitis (S/P small bowel resection 2012), HTN, HLD, AFib (on Xarelto) now several hours post op for an exlap, extensive DIANA, SBR c/b 2 enterotomies; repaired in 2 layers. Phlegmon identified in small bowel mesentery with associated abscess draining pus. Patient hemodynamically stable, recovering appropriately on floors.    Plan:   Diet: NPO/IVF/NGT  f/u CXR for NGT  Change packing in AM  Dvt ppx: LVX  Ambulate as tolerated  Monitor I/Os  f/u AM labs    Team A Surgery [id number]

## 2020-12-20 LAB
ANION GAP SERPL CALC-SCNC: 12 MMOL/L — SIGNIFICANT CHANGE UP (ref 7–14)
BUN SERPL-MCNC: 6 MG/DL — LOW (ref 7–23)
CALCIUM SERPL-MCNC: 8.9 MG/DL — SIGNIFICANT CHANGE UP (ref 8.4–10.5)
CHLORIDE SERPL-SCNC: 102 MMOL/L — SIGNIFICANT CHANGE UP (ref 98–107)
CO2 SERPL-SCNC: 25 MMOL/L — SIGNIFICANT CHANGE UP (ref 22–31)
CREAT SERPL-MCNC: 0.58 MG/DL — SIGNIFICANT CHANGE UP (ref 0.5–1.3)
CULTURE RESULTS: SIGNIFICANT CHANGE UP
CULTURE RESULTS: SIGNIFICANT CHANGE UP
GLUCOSE BLDC GLUCOMTR-MCNC: 139 MG/DL — HIGH (ref 70–99)
GLUCOSE BLDC GLUCOMTR-MCNC: 145 MG/DL — HIGH (ref 70–99)
GLUCOSE BLDC GLUCOMTR-MCNC: 146 MG/DL — HIGH (ref 70–99)
GLUCOSE BLDC GLUCOMTR-MCNC: 158 MG/DL — HIGH (ref 70–99)
GLUCOSE SERPL-MCNC: 153 MG/DL — HIGH (ref 70–99)
HCT VFR BLD CALC: 32.8 % — LOW (ref 34.5–45)
HGB BLD-MCNC: 10.4 G/DL — LOW (ref 11.5–15.5)
MAGNESIUM SERPL-MCNC: 1.8 MG/DL — SIGNIFICANT CHANGE UP (ref 1.6–2.6)
MCHC RBC-ENTMCNC: 27.6 PG — SIGNIFICANT CHANGE UP (ref 27–34)
MCHC RBC-ENTMCNC: 31.7 GM/DL — LOW (ref 32–36)
MCV RBC AUTO: 87 FL — SIGNIFICANT CHANGE UP (ref 80–100)
NRBC # BLD: 0 /100 WBCS — SIGNIFICANT CHANGE UP
NRBC # FLD: 0 K/UL — SIGNIFICANT CHANGE UP
PHOSPHATE SERPL-MCNC: 2.1 MG/DL — LOW (ref 2.5–4.5)
PLATELET # BLD AUTO: 376 K/UL — SIGNIFICANT CHANGE UP (ref 150–400)
POTASSIUM SERPL-MCNC: 3.9 MMOL/L — SIGNIFICANT CHANGE UP (ref 3.5–5.3)
POTASSIUM SERPL-SCNC: 3.9 MMOL/L — SIGNIFICANT CHANGE UP (ref 3.5–5.3)
RBC # BLD: 3.77 M/UL — LOW (ref 3.8–5.2)
RBC # FLD: 13.9 % — SIGNIFICANT CHANGE UP (ref 10.3–14.5)
SODIUM SERPL-SCNC: 139 MMOL/L — SIGNIFICANT CHANGE UP (ref 135–145)
SPECIMEN SOURCE: SIGNIFICANT CHANGE UP
SPECIMEN SOURCE: SIGNIFICANT CHANGE UP
WBC # BLD: 13.08 K/UL — HIGH (ref 3.8–10.5)
WBC # FLD AUTO: 13.08 K/UL — HIGH (ref 3.8–10.5)

## 2020-12-20 RX ORDER — MAGNESIUM SULFATE 500 MG/ML
2 VIAL (ML) INJECTION ONCE
Refills: 0 | Status: COMPLETED | OUTPATIENT
Start: 2020-12-20 | End: 2020-12-20

## 2020-12-20 RX ORDER — SODIUM CHLORIDE 9 MG/ML
500 INJECTION, SOLUTION INTRAVENOUS ONCE
Refills: 0 | Status: COMPLETED | OUTPATIENT
Start: 2020-12-20 | End: 2020-12-20

## 2020-12-20 RX ADMIN — Medication 63.75 MILLIMOLE(S): at 13:22

## 2020-12-20 RX ADMIN — Medication 50 GRAM(S): at 12:42

## 2020-12-20 RX ADMIN — HYDROMORPHONE HYDROCHLORIDE 1 MILLIGRAM(S): 2 INJECTION INTRAMUSCULAR; INTRAVENOUS; SUBCUTANEOUS at 22:15

## 2020-12-20 RX ADMIN — Medication 1: at 05:45

## 2020-12-20 RX ADMIN — Medication 5 MILLIGRAM(S): at 12:42

## 2020-12-20 RX ADMIN — HYDROMORPHONE HYDROCHLORIDE 1 MILLIGRAM(S): 2 INJECTION INTRAMUSCULAR; INTRAVENOUS; SUBCUTANEOUS at 23:52

## 2020-12-20 RX ADMIN — PIPERACILLIN AND TAZOBACTAM 25 GRAM(S): 4; .5 INJECTION, POWDER, LYOPHILIZED, FOR SOLUTION INTRAVENOUS at 09:09

## 2020-12-20 RX ADMIN — PIPERACILLIN AND TAZOBACTAM 25 GRAM(S): 4; .5 INJECTION, POWDER, LYOPHILIZED, FOR SOLUTION INTRAVENOUS at 01:57

## 2020-12-20 RX ADMIN — HYDROMORPHONE HYDROCHLORIDE 1 MILLIGRAM(S): 2 INJECTION INTRAMUSCULAR; INTRAVENOUS; SUBCUTANEOUS at 17:41

## 2020-12-20 RX ADMIN — SODIUM CHLORIDE 100 MILLILITER(S): 9 INJECTION, SOLUTION INTRAVENOUS at 09:11

## 2020-12-20 RX ADMIN — Medication 5 MILLIGRAM(S): at 17:41

## 2020-12-20 RX ADMIN — HYDROMORPHONE HYDROCHLORIDE 1 MILLIGRAM(S): 2 INJECTION INTRAMUSCULAR; INTRAVENOUS; SUBCUTANEOUS at 17:56

## 2020-12-20 RX ADMIN — ENOXAPARIN SODIUM 40 MILLIGRAM(S): 100 INJECTION SUBCUTANEOUS at 12:42

## 2020-12-20 RX ADMIN — HYDROMORPHONE HYDROCHLORIDE 1 MILLIGRAM(S): 2 INJECTION INTRAMUSCULAR; INTRAVENOUS; SUBCUTANEOUS at 05:46

## 2020-12-20 RX ADMIN — HYDROMORPHONE HYDROCHLORIDE 1 MILLIGRAM(S): 2 INJECTION INTRAMUSCULAR; INTRAVENOUS; SUBCUTANEOUS at 12:42

## 2020-12-20 RX ADMIN — HYDROMORPHONE HYDROCHLORIDE 1 MILLIGRAM(S): 2 INJECTION INTRAMUSCULAR; INTRAVENOUS; SUBCUTANEOUS at 12:57

## 2020-12-20 RX ADMIN — Medication 400 MILLIGRAM(S): at 00:52

## 2020-12-20 RX ADMIN — HYDROMORPHONE HYDROCHLORIDE 1 MILLIGRAM(S): 2 INJECTION INTRAMUSCULAR; INTRAVENOUS; SUBCUTANEOUS at 06:16

## 2020-12-20 RX ADMIN — SODIUM CHLORIDE 500 MILLILITER(S): 9 INJECTION, SOLUTION INTRAVENOUS at 00:52

## 2020-12-20 RX ADMIN — Medication 5 MILLIGRAM(S): at 23:53

## 2020-12-20 RX ADMIN — Medication 5 MILLIGRAM(S): at 05:45

## 2020-12-20 RX ADMIN — PIPERACILLIN AND TAZOBACTAM 25 GRAM(S): 4; .5 INJECTION, POWDER, LYOPHILIZED, FOR SOLUTION INTRAVENOUS at 18:28

## 2020-12-20 RX ADMIN — Medication 75 MICROGRAM(S): at 22:14

## 2020-12-20 NOTE — PROGRESS NOTE ADULT - ASSESSMENT
Patient is a 80 year old female with a PMHx of DM2, OA, hypothyroidism, cholecystectomy, VHR, jejunal diverticulitis (S/P small bowel resection 2012), HTN, HLD, AFib (on Xarelto) POD2 exlap, extensive DIANA, SBR c/b 2 enterotomies; repaired in 2 layers. Patient hemodynamically stable, recovering appropriately on floors.    Plan:   Diet: NPO/IVF/NGT  Dvt ppx: LVX  Ambulate as tolerated  Monitor I/Os  f/u AM labs    Team A Surgery [id number]

## 2020-12-21 LAB
ANION GAP SERPL CALC-SCNC: 10 MMOL/L — SIGNIFICANT CHANGE UP (ref 7–14)
BUN SERPL-MCNC: 5 MG/DL — LOW (ref 7–23)
CALCIUM SERPL-MCNC: 9 MG/DL — SIGNIFICANT CHANGE UP (ref 8.4–10.5)
CHLORIDE SERPL-SCNC: 102 MMOL/L — SIGNIFICANT CHANGE UP (ref 98–107)
CO2 SERPL-SCNC: 27 MMOL/L — SIGNIFICANT CHANGE UP (ref 22–31)
CREAT SERPL-MCNC: 0.58 MG/DL — SIGNIFICANT CHANGE UP (ref 0.5–1.3)
GLUCOSE BLDC GLUCOMTR-MCNC: 117 MG/DL — HIGH (ref 70–99)
GLUCOSE BLDC GLUCOMTR-MCNC: 121 MG/DL — HIGH (ref 70–99)
GLUCOSE BLDC GLUCOMTR-MCNC: 131 MG/DL — HIGH (ref 70–99)
GLUCOSE BLDC GLUCOMTR-MCNC: 132 MG/DL — HIGH (ref 70–99)
GLUCOSE BLDC GLUCOMTR-MCNC: 139 MG/DL — HIGH (ref 70–99)
GLUCOSE SERPL-MCNC: 132 MG/DL — HIGH (ref 70–99)
HCT VFR BLD CALC: 33.7 % — LOW (ref 34.5–45)
HGB BLD-MCNC: 10.5 G/DL — LOW (ref 11.5–15.5)
MAGNESIUM SERPL-MCNC: 2.3 MG/DL — SIGNIFICANT CHANGE UP (ref 1.6–2.6)
MCHC RBC-ENTMCNC: 27.6 PG — SIGNIFICANT CHANGE UP (ref 27–34)
MCHC RBC-ENTMCNC: 31.2 GM/DL — LOW (ref 32–36)
MCV RBC AUTO: 88.5 FL — SIGNIFICANT CHANGE UP (ref 80–100)
NRBC # BLD: 0 /100 WBCS — SIGNIFICANT CHANGE UP
NRBC # FLD: 0 K/UL — SIGNIFICANT CHANGE UP
PHOSPHATE SERPL-MCNC: 2.9 MG/DL — SIGNIFICANT CHANGE UP (ref 2.5–4.5)
PLATELET # BLD AUTO: 411 K/UL — HIGH (ref 150–400)
POTASSIUM SERPL-MCNC: 4.3 MMOL/L — SIGNIFICANT CHANGE UP (ref 3.5–5.3)
POTASSIUM SERPL-SCNC: 4.3 MMOL/L — SIGNIFICANT CHANGE UP (ref 3.5–5.3)
RBC # BLD: 3.81 M/UL — SIGNIFICANT CHANGE UP (ref 3.8–5.2)
RBC # FLD: 14.1 % — SIGNIFICANT CHANGE UP (ref 10.3–14.5)
SODIUM SERPL-SCNC: 139 MMOL/L — SIGNIFICANT CHANGE UP (ref 135–145)
WBC # BLD: 11.21 K/UL — HIGH (ref 3.8–10.5)
WBC # FLD AUTO: 11.21 K/UL — HIGH (ref 3.8–10.5)

## 2020-12-21 RX ORDER — ACETAMINOPHEN 500 MG
1000 TABLET ORAL EVERY 6 HOURS
Refills: 0 | Status: COMPLETED | OUTPATIENT
Start: 2020-12-21 | End: 2020-12-22

## 2020-12-21 RX ORDER — FLUCONAZOLE 150 MG/1
400 TABLET ORAL EVERY 24 HOURS
Refills: 0 | Status: DISCONTINUED | OUTPATIENT
Start: 2020-12-21 | End: 2020-12-23

## 2020-12-21 RX ADMIN — HYDROMORPHONE HYDROCHLORIDE 1 MILLIGRAM(S): 2 INJECTION INTRAMUSCULAR; INTRAVENOUS; SUBCUTANEOUS at 16:48

## 2020-12-21 RX ADMIN — HYDROMORPHONE HYDROCHLORIDE 1 MILLIGRAM(S): 2 INJECTION INTRAMUSCULAR; INTRAVENOUS; SUBCUTANEOUS at 08:28

## 2020-12-21 RX ADMIN — ENOXAPARIN SODIUM 40 MILLIGRAM(S): 100 INJECTION SUBCUTANEOUS at 12:12

## 2020-12-21 RX ADMIN — Medication 400 MILLIGRAM(S): at 18:34

## 2020-12-21 RX ADMIN — HYDROMORPHONE HYDROCHLORIDE 1 MILLIGRAM(S): 2 INJECTION INTRAMUSCULAR; INTRAVENOUS; SUBCUTANEOUS at 03:29

## 2020-12-21 RX ADMIN — SODIUM CHLORIDE 100 MILLILITER(S): 9 INJECTION, SOLUTION INTRAVENOUS at 05:26

## 2020-12-21 RX ADMIN — Medication 75 MICROGRAM(S): at 22:42

## 2020-12-21 RX ADMIN — HYDROMORPHONE HYDROCHLORIDE 1 MILLIGRAM(S): 2 INJECTION INTRAMUSCULAR; INTRAVENOUS; SUBCUTANEOUS at 21:44

## 2020-12-21 RX ADMIN — Medication 5 MILLIGRAM(S): at 12:12

## 2020-12-21 RX ADMIN — Medication 5 MILLIGRAM(S): at 05:44

## 2020-12-21 RX ADMIN — HYDROMORPHONE HYDROCHLORIDE 1 MILLIGRAM(S): 2 INJECTION INTRAMUSCULAR; INTRAVENOUS; SUBCUTANEOUS at 07:35

## 2020-12-21 RX ADMIN — HYDROMORPHONE HYDROCHLORIDE 1 MILLIGRAM(S): 2 INJECTION INTRAMUSCULAR; INTRAVENOUS; SUBCUTANEOUS at 04:22

## 2020-12-21 RX ADMIN — Medication 5 MILLIGRAM(S): at 23:36

## 2020-12-21 RX ADMIN — HYDROMORPHONE HYDROCHLORIDE 1 MILLIGRAM(S): 2 INJECTION INTRAMUSCULAR; INTRAVENOUS; SUBCUTANEOUS at 22:33

## 2020-12-21 RX ADMIN — FLUCONAZOLE 100 MILLIGRAM(S): 150 TABLET ORAL at 17:26

## 2020-12-21 RX ADMIN — Medication 400 MILLIGRAM(S): at 23:36

## 2020-12-21 RX ADMIN — HYDROMORPHONE HYDROCHLORIDE 1 MILLIGRAM(S): 2 INJECTION INTRAMUSCULAR; INTRAVENOUS; SUBCUTANEOUS at 12:27

## 2020-12-21 RX ADMIN — PIPERACILLIN AND TAZOBACTAM 25 GRAM(S): 4; .5 INJECTION, POWDER, LYOPHILIZED, FOR SOLUTION INTRAVENOUS at 02:36

## 2020-12-21 RX ADMIN — PIPERACILLIN AND TAZOBACTAM 25 GRAM(S): 4; .5 INJECTION, POWDER, LYOPHILIZED, FOR SOLUTION INTRAVENOUS at 09:45

## 2020-12-21 RX ADMIN — HYDROMORPHONE HYDROCHLORIDE 1 MILLIGRAM(S): 2 INJECTION INTRAMUSCULAR; INTRAVENOUS; SUBCUTANEOUS at 12:12

## 2020-12-21 RX ADMIN — PIPERACILLIN AND TAZOBACTAM 25 GRAM(S): 4; .5 INJECTION, POWDER, LYOPHILIZED, FOR SOLUTION INTRAVENOUS at 19:43

## 2020-12-21 RX ADMIN — HYDROMORPHONE HYDROCHLORIDE 1 MILLIGRAM(S): 2 INJECTION INTRAMUSCULAR; INTRAVENOUS; SUBCUTANEOUS at 17:03

## 2020-12-21 RX ADMIN — Medication 400 MILLIGRAM(S): at 12:12

## 2020-12-21 NOTE — PROGRESS NOTE ADULT - ASSESSMENT
80 year old female with a PMHx of DM2, OA, hypothyroidism, cholecystectomy, VHR, jejunal diverticulitis (S/P small bowel resection 2012), HTN, HLD, AFib (on Xarelto) POD2 exlap, extensive DIANA, SBR c/b 2 enterotomies; repaired in 2 layers. Patient hemodynamically stable, recovering appropriately on floors.    Plan:   Diet: NPO/IVF/NGT  Dvt ppx: LVX  Ambulate as tolerated  Monitor I/Os  f/u AM labs    Team A Surgery [id number]

## 2020-12-22 DIAGNOSIS — E11.9 TYPE 2 DIABETES MELLITUS WITHOUT COMPLICATIONS: ICD-10-CM

## 2020-12-22 DIAGNOSIS — K63.1 PERFORATION OF INTESTINE (NONTRAUMATIC): ICD-10-CM

## 2020-12-22 DIAGNOSIS — I48.20 CHRONIC ATRIAL FIBRILLATION, UNSPECIFIED: ICD-10-CM

## 2020-12-22 DIAGNOSIS — I10 ESSENTIAL (PRIMARY) HYPERTENSION: ICD-10-CM

## 2020-12-22 LAB
-  AMIKACIN: SIGNIFICANT CHANGE UP
-  AMOXICILLIN/CLAVULANIC ACID: SIGNIFICANT CHANGE UP
-  AMPICILLIN/SULBACTAM: SIGNIFICANT CHANGE UP
-  AMPICILLIN: SIGNIFICANT CHANGE UP
-  AZTREONAM: SIGNIFICANT CHANGE UP
-  CEFAZOLIN: SIGNIFICANT CHANGE UP
-  CEFEPIME: SIGNIFICANT CHANGE UP
-  CEFOXITIN: SIGNIFICANT CHANGE UP
-  CEFTRIAXONE: SIGNIFICANT CHANGE UP
-  CIPROFLOXACIN: SIGNIFICANT CHANGE UP
-  ERTAPENEM: SIGNIFICANT CHANGE UP
-  GENTAMICIN: SIGNIFICANT CHANGE UP
-  IMIPENEM: SIGNIFICANT CHANGE UP
-  LEVOFLOXACIN: SIGNIFICANT CHANGE UP
-  MEROPENEM: SIGNIFICANT CHANGE UP
-  PIPERACILLIN/TAZOBACTAM: SIGNIFICANT CHANGE UP
-  TOBRAMYCIN: SIGNIFICANT CHANGE UP
-  TRIMETHOPRIM/SULFAMETHOXAZOLE: SIGNIFICANT CHANGE UP
ANION GAP SERPL CALC-SCNC: 10 MMOL/L — SIGNIFICANT CHANGE UP (ref 7–14)
BUN SERPL-MCNC: 5 MG/DL — LOW (ref 7–23)
CALCIUM SERPL-MCNC: 9.1 MG/DL — SIGNIFICANT CHANGE UP (ref 8.4–10.5)
CHLORIDE SERPL-SCNC: 99 MMOL/L — SIGNIFICANT CHANGE UP (ref 98–107)
CO2 SERPL-SCNC: 27 MMOL/L — SIGNIFICANT CHANGE UP (ref 22–31)
CREAT SERPL-MCNC: 0.66 MG/DL — SIGNIFICANT CHANGE UP (ref 0.5–1.3)
GLUCOSE BLDC GLUCOMTR-MCNC: 121 MG/DL — HIGH (ref 70–99)
GLUCOSE BLDC GLUCOMTR-MCNC: 122 MG/DL — HIGH (ref 70–99)
GLUCOSE BLDC GLUCOMTR-MCNC: 122 MG/DL — HIGH (ref 70–99)
GLUCOSE BLDC GLUCOMTR-MCNC: 98 MG/DL — SIGNIFICANT CHANGE UP (ref 70–99)
GLUCOSE SERPL-MCNC: 233 MG/DL — HIGH (ref 70–99)
HCT VFR BLD CALC: 33.7 % — LOW (ref 34.5–45)
HGB BLD-MCNC: 10.7 G/DL — LOW (ref 11.5–15.5)
MAGNESIUM SERPL-MCNC: 2 MG/DL — SIGNIFICANT CHANGE UP (ref 1.6–2.6)
MCHC RBC-ENTMCNC: 28.5 PG — SIGNIFICANT CHANGE UP (ref 27–34)
MCHC RBC-ENTMCNC: 31.8 GM/DL — LOW (ref 32–36)
MCV RBC AUTO: 89.6 FL — SIGNIFICANT CHANGE UP (ref 80–100)
METHOD TYPE: SIGNIFICANT CHANGE UP
NRBC # BLD: 0 /100 WBCS — SIGNIFICANT CHANGE UP
NRBC # FLD: 0 K/UL — SIGNIFICANT CHANGE UP
PHOSPHATE SERPL-MCNC: 3 MG/DL — SIGNIFICANT CHANGE UP (ref 2.5–4.5)
PLATELET # BLD AUTO: 499 K/UL — HIGH (ref 150–400)
POTASSIUM SERPL-MCNC: 4.5 MMOL/L — SIGNIFICANT CHANGE UP (ref 3.5–5.3)
POTASSIUM SERPL-SCNC: 4.5 MMOL/L — SIGNIFICANT CHANGE UP (ref 3.5–5.3)
RBC # BLD: 3.76 M/UL — LOW (ref 3.8–5.2)
RBC # FLD: 13.8 % — SIGNIFICANT CHANGE UP (ref 10.3–14.5)
SODIUM SERPL-SCNC: 136 MMOL/L — SIGNIFICANT CHANGE UP (ref 135–145)
WBC # BLD: 15.24 K/UL — HIGH (ref 3.8–10.5)
WBC # FLD AUTO: 15.24 K/UL — HIGH (ref 3.8–10.5)

## 2020-12-22 RX ORDER — ACETAMINOPHEN 500 MG
1000 TABLET ORAL EVERY 6 HOURS
Refills: 0 | Status: COMPLETED | OUTPATIENT
Start: 2020-12-22 | End: 2020-12-23

## 2020-12-22 RX ORDER — BENZOCAINE AND MENTHOL 5; 1 G/100ML; G/100ML
1 LIQUID ORAL THREE TIMES A DAY
Refills: 0 | Status: DISCONTINUED | OUTPATIENT
Start: 2020-12-22 | End: 2020-12-24

## 2020-12-22 RX ADMIN — Medication 5 MILLIGRAM(S): at 06:57

## 2020-12-22 RX ADMIN — HYDROMORPHONE HYDROCHLORIDE 1 MILLIGRAM(S): 2 INJECTION INTRAMUSCULAR; INTRAVENOUS; SUBCUTANEOUS at 22:09

## 2020-12-22 RX ADMIN — HYDROMORPHONE HYDROCHLORIDE 1 MILLIGRAM(S): 2 INJECTION INTRAMUSCULAR; INTRAVENOUS; SUBCUTANEOUS at 14:05

## 2020-12-22 RX ADMIN — HYDROMORPHONE HYDROCHLORIDE 1 MILLIGRAM(S): 2 INJECTION INTRAMUSCULAR; INTRAVENOUS; SUBCUTANEOUS at 04:26

## 2020-12-22 RX ADMIN — HYDROMORPHONE HYDROCHLORIDE 1 MILLIGRAM(S): 2 INJECTION INTRAMUSCULAR; INTRAVENOUS; SUBCUTANEOUS at 09:50

## 2020-12-22 RX ADMIN — PIPERACILLIN AND TAZOBACTAM 25 GRAM(S): 4; .5 INJECTION, POWDER, LYOPHILIZED, FOR SOLUTION INTRAVENOUS at 02:17

## 2020-12-22 RX ADMIN — HYDROMORPHONE HYDROCHLORIDE 1 MILLIGRAM(S): 2 INJECTION INTRAMUSCULAR; INTRAVENOUS; SUBCUTANEOUS at 13:47

## 2020-12-22 RX ADMIN — Medication 5 MILLIGRAM(S): at 23:44

## 2020-12-22 RX ADMIN — HYDROMORPHONE HYDROCHLORIDE 1 MILLIGRAM(S): 2 INJECTION INTRAMUSCULAR; INTRAVENOUS; SUBCUTANEOUS at 03:32

## 2020-12-22 RX ADMIN — ENOXAPARIN SODIUM 40 MILLIGRAM(S): 100 INJECTION SUBCUTANEOUS at 12:11

## 2020-12-22 RX ADMIN — Medication 400 MILLIGRAM(S): at 23:43

## 2020-12-22 RX ADMIN — Medication 5 MILLIGRAM(S): at 17:45

## 2020-12-22 RX ADMIN — Medication 400 MILLIGRAM(S): at 06:57

## 2020-12-22 RX ADMIN — FLUCONAZOLE 100 MILLIGRAM(S): 150 TABLET ORAL at 14:53

## 2020-12-22 RX ADMIN — HYDROMORPHONE HYDROCHLORIDE 1 MILLIGRAM(S): 2 INJECTION INTRAMUSCULAR; INTRAVENOUS; SUBCUTANEOUS at 21:09

## 2020-12-22 RX ADMIN — Medication 400 MILLIGRAM(S): at 17:50

## 2020-12-22 RX ADMIN — SODIUM CHLORIDE 100 MILLILITER(S): 9 INJECTION, SOLUTION INTRAVENOUS at 03:32

## 2020-12-22 RX ADMIN — HYDROMORPHONE HYDROCHLORIDE 1 MILLIGRAM(S): 2 INJECTION INTRAMUSCULAR; INTRAVENOUS; SUBCUTANEOUS at 09:35

## 2020-12-22 RX ADMIN — Medication 75 MICROGRAM(S): at 23:43

## 2020-12-22 NOTE — CONSULT NOTE ADULT - PROBLEM SELECTOR RECOMMENDATION 4
Monitor HR closely, may obtain an EKG in AM. She was Xarelto priro to surgery, may need to reisnstate full anticoagualtion when risk of bleeding has been miniixed

## 2020-12-22 NOTE — PROVIDER CONTACT NOTE (CRITICAL VALUE NOTIFICATION) - TEST AND RESULT REPORTED:
GRAM STAIN POSITIVE NOPOLYMORPHONUCLEAR LEUKOCYTOSIS STAIN GRAM STAIN POSITIVE NOPOLYMORPHONUCLEAR LEUKOCYTOSIS STAIN from 12/18

## 2020-12-22 NOTE — CONSULT NOTE ADULT - SUBJECTIVE AND OBJECTIVE BOX
Patient is a 80y old  Female who presents with a chief complaint of Contained SB perforation vs. anastomotic leak (22 Dec 2020 07:48)      HPI:  80F hx DM2, OA, hypothyroid, cholecystectomy, VHR, jejunal diverticulitis s/p SBR (2012), HTN, HLD, Afib on Xarelto presents with abdominal pain x4 days. Patient reports pain similar to previous episodes of diverticulitis beginning last Friday. She called her PCP on Saturday and was prescribed Cipro/Flagyl and advised to only drink clears. She went for an outpatient CT earlier today which showed a perforation at the site of the anastomosis. She came to the ED for further evaluation. Denies fevers/chills, vomiting, dysuria, reports nausea. (15 Dec 2020 05:06)    s/p surgery  Post-op, she is complaining of pain , her BP is elevated. NGT in place, draining bilious material. No evidence of bleeding  PAST MEDICAL & SURGICAL HISTORY:  DM (diabetes mellitus)    Spinal stenosis of lumbar region    Sleep Apnea    Meniscus tear lateral left knee    h/o Prolapsed Uterus    h/o Pulmonary Embolus 30 yrs ago    h/o Phlebitis 30 yrs ago    h/o Cholecystitis    Hiatal Hernia    Hyperlipidemia    Arthritis    Diverticulitis    Adult Hypothyroidism    Right hand surgery 1999 , h/o arthritis    Left hand surgery 2004  h/o arthritis hand    h/o cholecystectomy  2010    S/P Hysterectomy    S/P Appendectomy    S/P Colon Resection        Review of Systems:   CONSTITUTIONAL: No fever, weight loss, or fatigue  EYES: No eye pain, visual disturbances, or discharge  ENMT:  No difficulty hearing, tinnitus, vertigo; No sinus or throat pain  NECK: No pain or stiffness  BREASTS: No pain, masses, or nipple discharge  RESPIRATORY: No cough, wheezing, chills or hemoptysis; No shortness of breath  CARDIOVASCULAR: No chest pain, palpitations, dizziness, or leg swelling  GASTROINTESTINAL: As above  GENITOURINARY: No dysuria, frequency, hematuria, or incontinence  NEUROLOGICAL: No headaches, memory loss, loss of strength, numbness, or tremors  SKIN: No itching, burning, rashes, or lesions   LYMPH NODES: No enlarged glands  ENDOCRINE: No heat or cold intolerance; No hair loss  MUSCULOSKELETAL: No joint pain or swelling; No muscle, back, or extremity pain  PSYCHIATRIC: No depression, anxiety, mood swings, or difficulty sleeping  HEME/LYMPH: No easy bruising, or bleeding gums  ALLERY AND IMMUNOLOGIC: No hives or eczema    Allergies    No Known Allergies    Intolerances        Social History:     FAMILY HISTORY:      MEDICATIONS  (STANDING):  acetaminophen  IVPB .. 1000 milliGRAM(s) IV Intermittent every 6 hours  dextrose 40% Gel 15 Gram(s) Oral once  dextrose 5% + lactated ringers 1000 milliLiter(s) (100 mL/Hr) IV Continuous <Continuous>  dextrose 50% Injectable 25 Gram(s) IV Push once  dextrose 50% Injectable 12.5 Gram(s) IV Push once  dextrose 50% Injectable 25 Gram(s) IV Push once  enoxaparin Injectable 40 milliGRAM(s) SubCutaneous daily  fluconAZOLE IVPB 400 milliGRAM(s) IV Intermittent every 24 hours  glucagon  Injectable 1 milliGRAM(s) IntraMuscular once  insulin lispro (ADMELOG) corrective regimen sliding scale   SubCutaneous every 6 hours  levothyroxine Injectable 75 MICROGram(s) IV Push at bedtime  metoprolol tartrate Injectable 5 milliGRAM(s) IV Push every 6 hours    MEDICATIONS  (PRN):  benzocaine 15 mG/menthol 3.6 mG (Sugar-Free) Lozenge 1 Lozenge Oral three times a day PRN Sore Throat  HYDROmorphone  Injectable 0.5 milliGRAM(s) IV Push every 4 hours PRN Moderate Pain (4 - 6)  HYDROmorphone  Injectable 1 milliGRAM(s) IV Push every 4 hours PRN Severe Pain (7 - 10)        CAPILLARY BLOOD GLUCOSE      POCT Blood Glucose.: 98 mg/dL (22 Dec 2020 17:00)  POCT Blood Glucose.: 121 mg/dL (22 Dec 2020 12:03)  POCT Blood Glucose.: 122 mg/dL (22 Dec 2020 06:00)  POCT Blood Glucose.: 121 mg/dL (21 Dec 2020 22:20)    I&O's Summary    21 Dec 2020 07:01  -  22 Dec 2020 07:00  --------------------------------------------------------  IN: 0 mL / OUT: 2400 mL / NET: -2400 mL    22 Dec 2020 07:01  -  22 Dec 2020 22:12  --------------------------------------------------------  IN: 0 mL / OUT: 1550 mL / NET: -1550 mL        PHYSICAL EXAM:  Vital Signs Last 24 Hrs  T(C): 37.4 (22 Dec 2020 20:57), Max: 37.4 (22 Dec 2020 20:57)  T(F): 99.3 (22 Dec 2020 20:57), Max: 99.3 (22 Dec 2020 20:57)  HR: 75 (22 Dec 2020 20:57) (59 - 78)  BP: 159/75 (22 Dec 2020 20:57) (154/54 - 184/57)  BP(mean): --  RR: 17 (22 Dec 2020 20:57) (17 - 18)  SpO2: 100% (22 Dec 2020 20:57) (96% - 100%)    GENERAL: NAD, well-developed  HEAD:  Atraumatic, Normocephalic  EYES: EOMI, PERRLA, conjunctiva and sclera clear  NECK: Supple, No JVD  CHEST/LUNG: Clear to auscultation bilaterally; No wheeze  HEART: Regular rate and rhythm; No murmurs, rubs, or gallops  ABDOMEN: Soft, minimal BS, surgical area clean  EXTREMITIES:  2+ Peripheral Pulses, No clubbing, cyanosis, or edema  PSYCH: AAOx3  NEUROLOGY: non-focal  SKIN: No rashes or lesions    LABS:                        10.7   15.24 )-----------( 499      ( 22 Dec 2020 06:43 )             33.7     12-22    136  |  99  |  5<L>  ----------------------------<  233<H>  4.5   |  27  |  0.66    Ca    9.1      22 Dec 2020 06:43  Phos  3.0     12-22  Mg     2.0     12-22                RADIOLOGY & ADDITIONAL TESTS:    Imaging Personally Reviewed:    Consultant(s) Notes Reviewed:      Care Discussed with Consultants/Other Providers:

## 2020-12-22 NOTE — PROGRESS NOTE ADULT - ASSESSMENT
Patient is a 80 year old female with a PMHx of DM2, OA, hypothyroidism, cholecystectomy, VHR, jejunal diverticulitis (S/P small bowel resection 2012), HTN, HLD, AFib (on Xarelto) who presented with abdominal pain x4 days.  She was found to have a contained small bowel perforation vs. anastomotic leak.  Patient is now S/P exploratory laparotomy with bowel resection on 12/18/20.      PLAN:  - NPO  - NGT to intermittent suction  - D5 + LR @100cc/hr  - Continue with IV Diflucan  - Daily dressing changes  - Out of bed  - Pain control  - VTE prophylaxis with Lovenox subcutaneous      #[id number]  A Team Surgery

## 2020-12-23 LAB
-  AMPICILLIN: SIGNIFICANT CHANGE UP
-  DAPTOMYCIN: SIGNIFICANT CHANGE UP
-  LEVOFLOXACIN: SIGNIFICANT CHANGE UP
-  LINEZOLID: SIGNIFICANT CHANGE UP
-  TETRACYCLINE: SIGNIFICANT CHANGE UP
-  VANCOMYCIN: SIGNIFICANT CHANGE UP
ANION GAP SERPL CALC-SCNC: 11 MMOL/L — SIGNIFICANT CHANGE UP (ref 7–14)
APTT BLD: 30.1 SEC — SIGNIFICANT CHANGE UP (ref 27–36.3)
BUN SERPL-MCNC: 6 MG/DL — LOW (ref 7–23)
CALCIUM SERPL-MCNC: 9.6 MG/DL — SIGNIFICANT CHANGE UP (ref 8.4–10.5)
CHLORIDE SERPL-SCNC: 102 MMOL/L — SIGNIFICANT CHANGE UP (ref 98–107)
CO2 SERPL-SCNC: 24 MMOL/L — SIGNIFICANT CHANGE UP (ref 22–31)
CREAT SERPL-MCNC: 0.55 MG/DL — SIGNIFICANT CHANGE UP (ref 0.5–1.3)
GLUCOSE BLDC GLUCOMTR-MCNC: 101 MG/DL — HIGH (ref 70–99)
GLUCOSE BLDC GLUCOMTR-MCNC: 118 MG/DL — HIGH (ref 70–99)
GLUCOSE BLDC GLUCOMTR-MCNC: 129 MG/DL — HIGH (ref 70–99)
GLUCOSE SERPL-MCNC: 117 MG/DL — HIGH (ref 70–99)
HCT VFR BLD CALC: 31.5 % — LOW (ref 34.5–45)
HCT VFR BLD CALC: 36.8 % — SIGNIFICANT CHANGE UP (ref 34.5–45)
HGB BLD-MCNC: 10.1 G/DL — LOW (ref 11.5–15.5)
HGB BLD-MCNC: 11.4 G/DL — LOW (ref 11.5–15.5)
INR BLD: 1.16 RATIO — SIGNIFICANT CHANGE UP (ref 0.88–1.17)
MAGNESIUM SERPL-MCNC: 2.1 MG/DL — SIGNIFICANT CHANGE UP (ref 1.6–2.6)
MCHC RBC-ENTMCNC: 27.3 PG — SIGNIFICANT CHANGE UP (ref 27–34)
MCHC RBC-ENTMCNC: 28 PG — SIGNIFICANT CHANGE UP (ref 27–34)
MCHC RBC-ENTMCNC: 31 GM/DL — LOW (ref 32–36)
MCHC RBC-ENTMCNC: 32.1 GM/DL — SIGNIFICANT CHANGE UP (ref 32–36)
MCV RBC AUTO: 87.3 FL — SIGNIFICANT CHANGE UP (ref 80–100)
MCV RBC AUTO: 88.2 FL — SIGNIFICANT CHANGE UP (ref 80–100)
METHOD TYPE: SIGNIFICANT CHANGE UP
NRBC # BLD: 0 /100 WBCS — SIGNIFICANT CHANGE UP
NRBC # BLD: 0 /100 WBCS — SIGNIFICANT CHANGE UP
NRBC # FLD: 0 K/UL — SIGNIFICANT CHANGE UP
NRBC # FLD: 0 K/UL — SIGNIFICANT CHANGE UP
PHOSPHATE SERPL-MCNC: 3 MG/DL — SIGNIFICANT CHANGE UP (ref 2.5–4.5)
PLATELET # BLD AUTO: 554 K/UL — HIGH (ref 150–400)
PLATELET # BLD AUTO: SIGNIFICANT CHANGE UP K/UL (ref 150–400)
POTASSIUM SERPL-MCNC: 4.7 MMOL/L — SIGNIFICANT CHANGE UP (ref 3.5–5.3)
POTASSIUM SERPL-SCNC: 4.7 MMOL/L — SIGNIFICANT CHANGE UP (ref 3.5–5.3)
PROTHROM AB SERPL-ACNC: 13.2 SEC — HIGH (ref 9.8–13.1)
RBC # BLD: 3.61 M/UL — LOW (ref 3.8–5.2)
RBC # BLD: 4.17 M/UL — SIGNIFICANT CHANGE UP (ref 3.8–5.2)
RBC # FLD: 13.7 % — SIGNIFICANT CHANGE UP (ref 10.3–14.5)
RBC # FLD: 13.8 % — SIGNIFICANT CHANGE UP (ref 10.3–14.5)
SODIUM SERPL-SCNC: 137 MMOL/L — SIGNIFICANT CHANGE UP (ref 135–145)
WBC # BLD: 14.94 K/UL — HIGH (ref 3.8–10.5)
WBC # BLD: 15.67 K/UL — HIGH (ref 3.8–10.5)
WBC # FLD AUTO: 14.94 K/UL — HIGH (ref 3.8–10.5)
WBC # FLD AUTO: 15.67 K/UL — HIGH (ref 3.8–10.5)

## 2020-12-23 PROCEDURE — 99223 1ST HOSP IP/OBS HIGH 75: CPT | Mod: GC

## 2020-12-23 RX ORDER — LINEZOLID 600 MG/300ML
INJECTION, SOLUTION INTRAVENOUS
Refills: 0 | Status: DISCONTINUED | OUTPATIENT
Start: 2020-12-23 | End: 2020-12-25

## 2020-12-23 RX ORDER — LINEZOLID 600 MG/300ML
600 INJECTION, SOLUTION INTRAVENOUS ONCE
Refills: 0 | Status: COMPLETED | OUTPATIENT
Start: 2020-12-23 | End: 2020-12-23

## 2020-12-23 RX ORDER — PIPERACILLIN AND TAZOBACTAM 4; .5 G/20ML; G/20ML
3.38 INJECTION, POWDER, LYOPHILIZED, FOR SOLUTION INTRAVENOUS ONCE
Refills: 0 | Status: COMPLETED | OUTPATIENT
Start: 2020-12-23 | End: 2020-12-23

## 2020-12-23 RX ORDER — PIPERACILLIN AND TAZOBACTAM 4; .5 G/20ML; G/20ML
3.38 INJECTION, POWDER, LYOPHILIZED, FOR SOLUTION INTRAVENOUS EVERY 8 HOURS
Refills: 0 | Status: DISCONTINUED | OUTPATIENT
Start: 2020-12-23 | End: 2020-12-25

## 2020-12-23 RX ORDER — LINEZOLID 600 MG/300ML
600 INJECTION, SOLUTION INTRAVENOUS EVERY 12 HOURS
Refills: 0 | Status: DISCONTINUED | OUTPATIENT
Start: 2020-12-24 | End: 2020-12-25

## 2020-12-23 RX ORDER — METOPROLOL TARTRATE 50 MG
10 TABLET ORAL EVERY 8 HOURS
Refills: 0 | Status: DISCONTINUED | OUTPATIENT
Start: 2020-12-23 | End: 2020-12-23

## 2020-12-23 RX ORDER — METOPROLOL TARTRATE 50 MG
10 TABLET ORAL EVERY 8 HOURS
Refills: 0 | Status: DISCONTINUED | OUTPATIENT
Start: 2020-12-23 | End: 2020-12-24

## 2020-12-23 RX ADMIN — LINEZOLID 300 MILLIGRAM(S): 600 INJECTION, SOLUTION INTRAVENOUS at 19:21

## 2020-12-23 RX ADMIN — HYDROMORPHONE HYDROCHLORIDE 1 MILLIGRAM(S): 2 INJECTION INTRAMUSCULAR; INTRAVENOUS; SUBCUTANEOUS at 05:50

## 2020-12-23 RX ADMIN — HYDROMORPHONE HYDROCHLORIDE 1 MILLIGRAM(S): 2 INJECTION INTRAMUSCULAR; INTRAVENOUS; SUBCUTANEOUS at 17:06

## 2020-12-23 RX ADMIN — PIPERACILLIN AND TAZOBACTAM 200 GRAM(S): 4; .5 INJECTION, POWDER, LYOPHILIZED, FOR SOLUTION INTRAVENOUS at 18:01

## 2020-12-23 RX ADMIN — HYDROMORPHONE HYDROCHLORIDE 1 MILLIGRAM(S): 2 INJECTION INTRAMUSCULAR; INTRAVENOUS; SUBCUTANEOUS at 11:48

## 2020-12-23 RX ADMIN — Medication 5 MILLIGRAM(S): at 05:36

## 2020-12-23 RX ADMIN — HYDROMORPHONE HYDROCHLORIDE 1 MILLIGRAM(S): 2 INJECTION INTRAMUSCULAR; INTRAVENOUS; SUBCUTANEOUS at 05:35

## 2020-12-23 RX ADMIN — HYDROMORPHONE HYDROCHLORIDE 1 MILLIGRAM(S): 2 INJECTION INTRAMUSCULAR; INTRAVENOUS; SUBCUTANEOUS at 12:03

## 2020-12-23 RX ADMIN — Medication 120 MILLIGRAM(S): at 14:12

## 2020-12-23 RX ADMIN — Medication 400 MILLIGRAM(S): at 05:35

## 2020-12-23 RX ADMIN — Medication 120 MILLIGRAM(S): at 21:08

## 2020-12-23 RX ADMIN — HYDROMORPHONE HYDROCHLORIDE 1 MILLIGRAM(S): 2 INJECTION INTRAMUSCULAR; INTRAVENOUS; SUBCUTANEOUS at 17:21

## 2020-12-23 RX ADMIN — Medication 400 MILLIGRAM(S): at 11:48

## 2020-12-23 RX ADMIN — HYDROMORPHONE HYDROCHLORIDE 1 MILLIGRAM(S): 2 INJECTION INTRAMUSCULAR; INTRAVENOUS; SUBCUTANEOUS at 22:33

## 2020-12-23 RX ADMIN — ENOXAPARIN SODIUM 40 MILLIGRAM(S): 100 INJECTION SUBCUTANEOUS at 11:48

## 2020-12-23 RX ADMIN — FLUCONAZOLE 100 MILLIGRAM(S): 150 TABLET ORAL at 14:21

## 2020-12-23 RX ADMIN — PIPERACILLIN AND TAZOBACTAM 25 GRAM(S): 4; .5 INJECTION, POWDER, LYOPHILIZED, FOR SOLUTION INTRAVENOUS at 23:47

## 2020-12-23 RX ADMIN — Medication 75 MICROGRAM(S): at 21:30

## 2020-12-23 RX ADMIN — HYDROMORPHONE HYDROCHLORIDE 1 MILLIGRAM(S): 2 INJECTION INTRAMUSCULAR; INTRAVENOUS; SUBCUTANEOUS at 21:30

## 2020-12-23 NOTE — CONSULT NOTE ADULT - REASON FOR ADMISSION
Contained SB perforation vs. anastomotic leak

## 2020-12-23 NOTE — CONSULT NOTE ADULT - ASSESSMENT
80F hx DM2, OA, hypothyroid, cholecystectomy, VHR, jejunal diverticulitis s/p SBR (2012), HTN, HLD, Afib on Xarelto presents with abdominal pain x4 days, with recurrent diverticulitis found to have phelgmon concerning for abscess vs anastomotic leak s/p exlap.      #Recurrent SB diverticulitis with abscess vs anastomotic leak s/p ex lap 12/18 -  On Zosyn since 12/14. Started fluc on 12/21. OR clx with E coli, VRE, and candida.  Currently with WBC at 15. Has been afebrile since admission but still with post op pain.  Passing gas today    Rec:  - Cont Zosyn  - start linezolid for VRE  - Monitor off antifungals  - monitor for fevers  - trend leukocytosis   - depending on improvement can switch to PO or will require repeat imaging.      Jose Alfredo Cintron MD  Fellow, Infectious Diseases, PGY-4  Pager: [phone]  Before 9am or after 5pm/Weekends: Call [phone]

## 2020-12-23 NOTE — CONSULT NOTE ADULT - SUBJECTIVE AND OBJECTIVE BOX
Patient is a 80y old  Female who presents with a chief complaint of Contained SB perforation vs. anastomotic leak (23 Dec 2020 14:01)    HPI:  80F hx DM2, OA, hypothyroid, cholecystectomy, VHR, jejunal diverticulitis s/p SBR (2012), HTN, HLD, Afib on Xarelto presents with abdominal pain x4 days. Patient reports pain similar to previous episodes of diverticulitis beginning last Friday. She called her PCP on Saturday and was prescribed Cipro/Flagyl and advised to only drink clears. She went for an outpatient CT earlier today which showed a perforation at the site of the anastomosis. She came to the ED for further evaluation. Denies fevers/chills, vomiting, dysuria, reports nausea. (15 Dec 2020 05:06)     prior hospital charts reviewed [  ]  primary team notes reviewed [  ]  other consultant notes reviewed [  ]    PAST MEDICAL & SURGICAL HISTORY:  DM (diabetes mellitus)    Spinal stenosis of lumbar region    Sleep Apnea    Meniscus tear lateral left knee    h/o Prolapsed Uterus    h/o Pulmonary Embolus 30 yrs ago    h/o Phlebitis 30 yrs ago    h/o Cholecystitis    Hiatal Hernia    Hyperlipidemia    Arthritis    Diverticulitis    Adult Hypothyroidism    Right hand surgery 1999 , h/o arthritis    Left hand surgery 2004  h/o arthritis hand    h/o cholecystectomy  2010    S/P Hysterectomy    S/P Appendectomy    S/P Colon Resection      Allergies  No Known Allergies    ANTIMICROBIALS (past 90 days)  MEDICATIONS  (STANDING):  fluconAZOLE IVPB   100 mL/Hr IV Intermittent (12-23-20 @ 14:21)   100 mL/Hr IV Intermittent (12-22-20 @ 14:53)   100 mL/Hr IV Intermittent (12-21-20 @ 17:26)    piperacillin/tazobactam IVPB.   200 mL/Hr IV Intermittent (12-14-20 @ 23:39)    piperacillin/tazobactam IVPB..   25 mL/Hr IV Intermittent (12-22-20 @ 02:17)   25 mL/Hr IV Intermittent (12-21-20 @ 19:43)   25 mL/Hr IV Intermittent (12-21-20 @ 09:45)   25 mL/Hr IV Intermittent (12-21-20 @ 02:36)   25 mL/Hr IV Intermittent (12-20-20 @ 18:28)   25 mL/Hr IV Intermittent (12-20-20 @ 09:09)   25 mL/Hr IV Intermittent (12-20-20 @ 01:57)   25 mL/Hr IV Intermittent (12-19-20 @ 18:59)   25 mL/Hr IV Intermittent (12-19-20 @ 09:51)   25 mL/Hr IV Intermittent (12-19-20 @ 03:24)   25 mL/Hr IV Intermittent (12-18-20 @ 07:43)   25 mL/Hr IV Intermittent (12-18-20 @ 00:29)   25 mL/Hr IV Intermittent (12-17-20 @ 15:46)   25 mL/Hr IV Intermittent (12-17-20 @ 08:01)   25 mL/Hr IV Intermittent (12-17-20 @ 00:53)   25 mL/Hr IV Intermittent (12-16-20 @ 15:36)   25 mL/Hr IV Intermittent (12-16-20 @ 07:43)   25 mL/Hr IV Intermittent (12-16-20 @ 00:35)   25 mL/Hr IV Intermittent (12-15-20 @ 17:32)   25 mL/Hr IV Intermittent (12-15-20 @ 08:40)      ANTIMICROBIALS:    fluconAZOLE IVPB 400 every 24 hours    OTHER MEDS: MEDICATIONS  (STANDING):  dextrose 40% Gel 15 once  dextrose 50% Injectable 25 once  dextrose 50% Injectable 12.5 once  dextrose 50% Injectable 25 once  enoxaparin Injectable 40 daily  glucagon  Injectable 1 once  HYDROmorphone  Injectable 1 every 4 hours PRN  HYDROmorphone  Injectable 0.5 every 4 hours PRN  insulin lispro (ADMELOG) corrective regimen sliding scale  every 6 hours  levothyroxine Injectable 75 at bedtime  metoprolol tartrate IVPB 10 every 8 hours    SOCIAL HISTORY:   Denies tobacco, alcohol, and recreational drug use. (15 Dec 2020 05:06)      FAMILY HISTORY:    REVIEW OF SYSTEMS  [  ] ROS unobtainable because:    [  ] All other systems negative except as noted below:	    Constitutional:  [ ] fever [ ] chills  [ ] weight loss  [ ] weakness  Skin:  [ ] rash [ ] phlebitis	  Eyes: [ ] icterus [ ] pain  [ ] discharge	  ENMT: [ ] sore throat  [ ] thrush [ ] ulcers [ ] exudates  Respiratory: [ ] dyspnea [ ] hemoptysis [ ] cough [ ] sputum	  Cardiovascular:  [ ] chest pain [ ] palpitations [ ] edema	  Gastrointestinal:  [ ] nausea [ ] vomiting [ ] diarrhea [ ] constipation [ ] pain	  Genitourinary:  [ ] dysuria [ ] frequency [ ] hematuria [ ] discharge [ ] flank pain  [ ] incontinence  Musculoskeletal:  [ ] myalgias [ ] arthralgias [ ] arthritis  [ ] back pain  Neurological:  [ ] headache [ ] seizures  [ ] confusion/altered mental status  Psychiatric:  [ ] anxiety [ ] depression	  Hematology/Lymphatics:  [ ] lymphadenopathy  Endocrine:  [ ] adrenal [ ] thyroid  Allergic/Immunologic:	 [ ] transplant [ ] seasonal    Vital Signs Last 24 Hrs  T(F): 99 (12-23-20 @ 14:08), Max: 99.3 (12-22-20 @ 20:57)  Vital Signs Last 24 Hrs  HR: 62 (12-23-20 @ 14:08) (59 - 75)  BP: 168/60 (12-23-20 @ 14:08) (159/75 - 185/65)  RR: 18 (12-23-20 @ 14:08)  SpO2: 100% (12-23-20 @ 14:08) (97% - 100%)  Wt(kg): --    EXAM:  Constitutional: Not in acute distress  Eyes: pupils bilaterally reactive to light. No icterus.  Oral cavity: Clear, no lesions  Neck: No neck vein distension noted  RS: Chest clear to auscultation bilaterally. No wheeze/rhonchi/crepitations.  CVS: S1, S2 heard. Regular rate and rhythm. No murmurs/rubs/gallops.  Abdomen: Soft. No guarding/rigidity/tenderness.  : No acute abnormalities  Extremities: Warm. No pedal edema  Skin: No lesions noted  Vascular: No evidence of phlebitis  Neuro: Alert, oriented to time/place/person                          10.1   15.67 )-----------( 554      ( 23 Dec 2020 10:37 )             31.5     12-23    137  |  102  |  6<L>  ----------------------------<  117<H>  4.7   |  24  |  0.55    Ca    9.6      23 Dec 2020 05:36  Phos  3.0     12-23  Mg     2.1     12-23      MICROBIOLOGY:  Culture - Fungal, Other (collected 19 Dec 2020 06:31)  Source: .Other INTRA ABDOMINAL ABSCESS  Preliminary Report (22 Dec 2020 12:34):    Few Candida albicans    Few Joanne glabrata    Culture - Abscess with Gram Stain (collected 19 Dec 2020 06:25)  Source: .Abscess INTRA ABDOMINAL ABCESS  Preliminary Report (23 Dec 2020 10:38):    Few Candida albicans "Susceptibilities not performed"    Rare Escherichia coli    Moderate Enterococcus faecium (vancomycin resistant)  Organism: Escherichia coli  Enterococcus faecium (vancomycin resistant) (23 Dec 2020 10:38)  Organism: Enterococcus faecium (vancomycin resistant) (23 Dec 2020 10:38)      -  Ampicillin: R >8 Predicts results to ampicillin/sulbactam, amoxacillin-clavulanate and  piperacillin-tazobactam.      -  Daptomycin: SDD 2 The breakpoint for SDD (sensitive dose dependent)is based on a dosage regimen of 8-12 mg/kg administered every 24 h in adults and is intended for serious infections due to E. faecium. Consultation with an infectious diseases specialist is recommended.      -  Levofloxacin: R >4      -  Linezolid: S 1      -  Tetra/Doxy: R >8      -  Vancomycin: R >16      Method Type: JULIET  Organism: Escherichia coli (22 Dec 2020 07:46)      -  Amikacin: S <=16      -  Amoxicillin/Clavulanic Acid: S <=8/4      -  Ampicillin: S <=8 These ampicillin results predict results for amoxicillin      -  Ampicillin/Sulbactam: S <=4/2 Enterobacter, Citrobacter, and Serratia may develop resistance during prolonged therapy (3-4 days)      -  Aztreonam: S <=4      -  Cefazolin: S <=2 Enterobacter, Citrobacter, and Serratia may develop resistance during prolonged therapy (3-4 days)      -  Cefepime: S <=2      -  Cefoxitin: S <=8      -  Ceftriaxone: S <=1 Enterobacter, Citrobacter, and Serratia may develop resistance during prolonged therapy      -  Ciprofloxacin: S <=0.25      -  Ertapenem: S <=0.5      -  Gentamicin: S <=2      -  Imipenem: S <=1      -  Levofloxacin: S <=0.5      -  Meropenem: S <=1      -  Piperacillin/Tazobactam: S <=8      -  Tobramycin: S <=2      -  Trimethoprim/Sulfamethoxazole: S <=0.5/9.5      Method Type: JULIET                      RADIOLOGY:  imaging below personally reviewed    OTHER TESTS:   Patient is a 80y old  Female who presents with a chief complaint of Contained SB perforation vs. anastomotic leak (23 Dec 2020 14:01)    HPI:  80F hx DM2, OA, hypothyroid, cholecystectomy, VHR, jejunal diverticulitis s/p SBR (2012), HTN, HLD, Afib on Xarelto presents with abdominal pain x4 days. Patient reports pain similar to previous episodes of diverticulitis beginning last Friday. She called her PCP on Saturday and was prescribed Cipro/Flagyl and advised to only drink clears. She went for an outpatient CT earlier today which showed a perforation at the site of the anastomosis. She came to the ED for further evaluation. Denies fevers/chills, vomiting, dysuria, reports nausea.     Pt plegmon concerning for diverticular abscess vs anastomotic leak. S/p exlap 12/18. Has been receiving Zosyn since admit.   Cultures with E coli, VRE, and candida.  ID consulted for abx management.  Pt reports passing gas last night and NGT was removed. Endorses abd pain, controlled with meds. Denies n/v/d. No abx allergies.     prior hospital charts reviewed [  ]  primary team notes reviewed [ x ]  other consultant notes reviewed [ x ]    PAST MEDICAL & SURGICAL HISTORY:  DM (diabetes mellitus)  Spinal stenosis of lumbar region  Sleep Apnea  Meniscus tear lateral left knee  h/o Prolapsed Uterus  h/o Pulmonary Embolus 30 yrs ago  h/o Phlebitis 30 yrs ago  h/o Cholecystitis  Hiatal Hernia  Hyperlipidemia  Arthritis  Diverticulitis  Adult Hypothyroidism  Right hand surgery 1999 , h/o arthritis  Left hand surgery 2004 h/o arthritis hand  h/o cholecystectomy  2010  S/P Hysterectomy  S/P Appendectomy  S/P Colon Resection      Allergies  No Known Allergies    ANTIMICROBIALS (past 90 days)  MEDICATIONS  (STANDING):  fluconAZOLE IVPB   100 mL/Hr IV Intermittent (12-23-20 @ 14:21)   100 mL/Hr IV Intermittent (12-22-20 @ 14:53)   100 mL/Hr IV Intermittent (12-21-20 @ 17:26)    piperacillin/tazobactam IVPB.   200 mL/Hr IV Intermittent (12-14-20 @ 23:39)    piperacillin/tazobactam IVPB..   25 mL/Hr IV Intermittent (12-22-20 @ 02:17)   25 mL/Hr IV Intermittent (12-21-20 @ 19:43)   25 mL/Hr IV Intermittent (12-21-20 @ 09:45)   25 mL/Hr IV Intermittent (12-21-20 @ 02:36)   25 mL/Hr IV Intermittent (12-20-20 @ 18:28)   25 mL/Hr IV Intermittent (12-20-20 @ 09:09)   25 mL/Hr IV Intermittent (12-20-20 @ 01:57)   25 mL/Hr IV Intermittent (12-19-20 @ 18:59)   25 mL/Hr IV Intermittent (12-19-20 @ 09:51)   25 mL/Hr IV Intermittent (12-19-20 @ 03:24)   25 mL/Hr IV Intermittent (12-18-20 @ 07:43)   25 mL/Hr IV Intermittent (12-18-20 @ 00:29)   25 mL/Hr IV Intermittent (12-17-20 @ 15:46)   25 mL/Hr IV Intermittent (12-17-20 @ 08:01)   25 mL/Hr IV Intermittent (12-17-20 @ 00:53)   25 mL/Hr IV Intermittent (12-16-20 @ 15:36)   25 mL/Hr IV Intermittent (12-16-20 @ 07:43)   25 mL/Hr IV Intermittent (12-16-20 @ 00:35)   25 mL/Hr IV Intermittent (12-15-20 @ 17:32)   25 mL/Hr IV Intermittent (12-15-20 @ 08:40)      ANTIMICROBIALS:    fluconAZOLE IVPB 400 every 24 hours    OTHER MEDS: MEDICATIONS  (STANDING):  dextrose 40% Gel 15 once  dextrose 50% Injectable 25 once  dextrose 50% Injectable 12.5 once  dextrose 50% Injectable 25 once  enoxaparin Injectable 40 daily  glucagon  Injectable 1 once  HYDROmorphone  Injectable 1 every 4 hours PRN  HYDROmorphone  Injectable 0.5 every 4 hours PRN  insulin lispro (ADMELOG) corrective regimen sliding scale  every 6 hours  levothyroxine Injectable 75 at bedtime  metoprolol tartrate IVPB 10 every 8 hours    SOCIAL HISTORY:   Denies tobacco, alcohol, and recreational drug use.       FAMILY HISTORY:    REVIEW OF SYSTEMS  [  ] ROS unobtainable because:    [ x ] All other systems negative except as noted below:	    Constitutional:  [ ] fever [ ] chills  [ ] weight loss  [ ] weakness  Skin:  [ ] rash [ ] phlebitis	  Eyes: [ ] icterus [ ] pain  [ ] discharge	  ENMT: [ ] sore throat  [ ] thrush [ ] ulcers [ ] exudates  Respiratory: [ ] dyspnea [ ] hemoptysis [ ] cough [ ] sputum	  Cardiovascular:  [ ] chest pain [ ] palpitations [ ] edema	  Gastrointestinal:  [ ] nausea [ ] vomiting [ ] diarrhea [ ] constipation [x] pain	  Genitourinary:  [ ] dysuria [ ] frequency [ ] hematuria [ ] discharge [ ] flank pain  [ ] incontinence  Musculoskeletal:  [ ] myalgias [ ] arthralgias [ ] arthritis  [ ] back pain  Neurological:  [ ] headache [ ] seizures  [ ] confusion/altered mental status  Psychiatric:  [ ] anxiety [ ] depression	  Hematology/Lymphatics:  [ ] lymphadenopathy  Endocrine:  [ ] adrenal [ ] thyroid  Allergic/Immunologic:	 [ ] transplant [ ] seasonal    Vital Signs Last 24 Hrs  T(F): 99 (12-23-20 @ 14:08), Max: 99.3 (12-22-20 @ 20:57)  Vital Signs Last 24 Hrs  HR: 62 (12-23-20 @ 14:08) (59 - 75)  BP: 168/60 (12-23-20 @ 14:08) (159/75 - 185/65)  RR: 18 (12-23-20 @ 14:08)  SpO2: 100% (12-23-20 @ 14:08) (97% - 100%)  Wt(kg): --    EXAM:  Constitutional: Not in acute distress  Eyes: pupils bilaterally reactive to light. No icterus.  Oral cavity: Clear, no lesions  Neck: No neck vein distension noted  RS: Chest clear to auscultation bilaterally. No wheeze/rhonchi/crepitations.  CVS: S1, S2 heard. Regular rate and rhythm. No murmurs/rubs/gallops.  Abdomen: Soft. No guarding/rigidity/tenderness. vertical incision packed, clean and dry  : No acute abnormalities  Extremities: Warm. No pedal edema  Skin: No lesions noted  Vascular: No evidence of phlebitis  Neuro: Alert, oriented to time/place/person                          10.1   15.67 )-----------( 554      ( 23 Dec 2020 10:37 )             31.5     12-23    137  |  102  |  6<L>  ----------------------------<  117<H>  4.7   |  24  |  0.55    Ca    9.6      23 Dec 2020 05:36  Phos  3.0     12-23  Mg     2.1     12-23      MICROBIOLOGY:  Culture - Fungal, Other (collected 19 Dec 2020 06:31)  Source: .Other INTRA ABDOMINAL ABSCESS  Preliminary Report (22 Dec 2020 12:34):    Few Candida albicans    Few Joanne glabrata    Culture - Abscess with Gram Stain (collected 19 Dec 2020 06:25)  Source: .Abscess INTRA ABDOMINAL ABCESS  Preliminary Report (23 Dec 2020 10:38):    Few Candida albicans "Susceptibilities not performed"    Rare Escherichia coli    Moderate Enterococcus faecium (vancomycin resistant)  Organism: Escherichia coli  Enterococcus faecium (vancomycin resistant) (23 Dec 2020 10:38)  Organism: Enterococcus faecium (vancomycin resistant) (23 Dec 2020 10:38)      -  Ampicillin: R >8 Predicts results to ampicillin/sulbactam, amoxacillin-clavulanate and  piperacillin-tazobactam.      -  Daptomycin: SDD 2 The breakpoint for SDD (sensitive dose dependent)is based on a dosage regimen of 8-12 mg/kg administered every 24 h in adults and is intended for serious infections due to E. faecium. Consultation with an infectious diseases specialist is recommended.      -  Levofloxacin: R >4      -  Linezolid: S 1      -  Tetra/Doxy: R >8      -  Vancomycin: R >16      Method Type: JULIET  Organism: Escherichia coli (22 Dec 2020 07:46)      -  Amikacin: S <=16      -  Amoxicillin/Clavulanic Acid: S <=8/4      -  Ampicillin: S <=8 These ampicillin results predict results for amoxicillin      -  Ampicillin/Sulbactam: S <=4/2 Enterobacter, Citrobacter, and Serratia may develop resistance during prolonged therapy (3-4 days)      -  Aztreonam: S <=4      -  Cefazolin: S <=2 Enterobacter, Citrobacter, and Serratia may develop resistance during prolonged therapy (3-4 days)      -  Cefepime: S <=2      -  Cefoxitin: S <=8      -  Ceftriaxone: S <=1 Enterobacter, Citrobacter, and Serratia may develop resistance during prolonged therapy      -  Ciprofloxacin: S <=0.25      -  Ertapenem: S <=0.5      -  Gentamicin: S <=2      -  Imipenem: S <=1      -  Levofloxacin: S <=0.5      -  Meropenem: S <=1      -  Piperacillin/Tazobactam: S <=8      -  Tobramycin: S <=2      -  Trimethoprim/Sulfamethoxazole: S <=0.5/9.5      Method Type: JULIET            RADIOLOGY:  Imaging below personally reviewed    < from: CT Abdomen and Pelvis w/ IV Cont (12.15.20 @ 03:18) >  IMPRESSION:  Perforated small bowel in the left abdomen with partially walled off fluid collection. Differential includes perforated small bowel diverticulitis versus anastomotic breakdown given the location of the perforation..      OTHER TESTS:

## 2020-12-23 NOTE — PROVIDER CONTACT NOTE (CRITICAL VALUE NOTIFICATION) - SITUATION
Abscess Culture from 12/18 with few Candida Albicans, Rare E-coli, M9od. Enterococcus Faecium. Vanco resistant.

## 2020-12-23 NOTE — CONSULT NOTE ADULT - ATTENDING COMMENTS
80 year old with small bowel diverticulitis with  associated abscess vs anastamotic leak, s/p surgery on 12/19 with bowel resection/repair.    Cultures with polymicrobial growth including yeast/ E coli/ VRE  Yeast and VRE are less likely pathogens.    I think we can stop fluconazole.    Continue zosyn. Add linezolid  Trend WBC     If not improving, repeat CT    WIll discuss plan with surgery

## 2020-12-24 ENCOUNTER — TRANSCRIPTION ENCOUNTER (OUTPATIENT)
Age: 80
End: 2020-12-24

## 2020-12-24 LAB
ANION GAP SERPL CALC-SCNC: 8 MMOL/L — SIGNIFICANT CHANGE UP (ref 7–14)
BUN SERPL-MCNC: 6 MG/DL — LOW (ref 7–23)
CALCIUM SERPL-MCNC: 9.3 MG/DL — SIGNIFICANT CHANGE UP (ref 8.4–10.5)
CHLORIDE SERPL-SCNC: 100 MMOL/L — SIGNIFICANT CHANGE UP (ref 98–107)
CO2 SERPL-SCNC: 26 MMOL/L — SIGNIFICANT CHANGE UP (ref 22–31)
CREAT SERPL-MCNC: 0.71 MG/DL — SIGNIFICANT CHANGE UP (ref 0.5–1.3)
CULTURE RESULTS: SIGNIFICANT CHANGE UP
GLUCOSE BLDC GLUCOMTR-MCNC: 110 MG/DL — HIGH (ref 70–99)
GLUCOSE BLDC GLUCOMTR-MCNC: 127 MG/DL — HIGH (ref 70–99)
GLUCOSE BLDC GLUCOMTR-MCNC: 127 MG/DL — HIGH (ref 70–99)
GLUCOSE BLDC GLUCOMTR-MCNC: 98 MG/DL — SIGNIFICANT CHANGE UP (ref 70–99)
GLUCOSE BLDC GLUCOMTR-MCNC: 98 MG/DL — SIGNIFICANT CHANGE UP (ref 70–99)
GLUCOSE SERPL-MCNC: 121 MG/DL — HIGH (ref 70–99)
HCT VFR BLD CALC: 30.9 % — LOW (ref 34.5–45)
HGB BLD-MCNC: 9.6 G/DL — LOW (ref 11.5–15.5)
MAGNESIUM SERPL-MCNC: 1.9 MG/DL — SIGNIFICANT CHANGE UP (ref 1.6–2.6)
MCHC RBC-ENTMCNC: 27.4 PG — SIGNIFICANT CHANGE UP (ref 27–34)
MCHC RBC-ENTMCNC: 31.1 GM/DL — LOW (ref 32–36)
MCV RBC AUTO: 88.3 FL — SIGNIFICANT CHANGE UP (ref 80–100)
NRBC # BLD: 0 /100 WBCS — SIGNIFICANT CHANGE UP
NRBC # FLD: 0 K/UL — SIGNIFICANT CHANGE UP
ORGANISM # SPEC MICROSCOPIC CNT: SIGNIFICANT CHANGE UP
PHOSPHATE SERPL-MCNC: 2.9 MG/DL — SIGNIFICANT CHANGE UP (ref 2.5–4.5)
PLATELET # BLD AUTO: 565 K/UL — HIGH (ref 150–400)
POTASSIUM SERPL-MCNC: 4.5 MMOL/L — SIGNIFICANT CHANGE UP (ref 3.5–5.3)
POTASSIUM SERPL-SCNC: 4.5 MMOL/L — SIGNIFICANT CHANGE UP (ref 3.5–5.3)
RBC # BLD: 3.5 M/UL — LOW (ref 3.8–5.2)
RBC # FLD: 14 % — SIGNIFICANT CHANGE UP (ref 10.3–14.5)
SODIUM SERPL-SCNC: 134 MMOL/L — LOW (ref 135–145)
SPECIMEN SOURCE: SIGNIFICANT CHANGE UP
WBC # BLD: 15.33 K/UL — HIGH (ref 3.8–10.5)
WBC # FLD AUTO: 15.33 K/UL — HIGH (ref 3.8–10.5)

## 2020-12-24 PROCEDURE — 99232 SBSQ HOSP IP/OBS MODERATE 35: CPT

## 2020-12-24 RX ORDER — LEVOTHYROXINE SODIUM 125 MCG
100 TABLET ORAL DAILY
Refills: 0 | Status: DISCONTINUED | OUTPATIENT
Start: 2020-12-24 | End: 2020-12-25

## 2020-12-24 RX ORDER — ACETAMINOPHEN 500 MG
975 TABLET ORAL EVERY 6 HOURS
Refills: 0 | Status: DISCONTINUED | OUTPATIENT
Start: 2020-12-24 | End: 2020-12-25

## 2020-12-24 RX ORDER — ACETAMINOPHEN 500 MG
650 TABLET ORAL EVERY 6 HOURS
Refills: 0 | Status: DISCONTINUED | OUTPATIENT
Start: 2020-12-24 | End: 2020-12-24

## 2020-12-24 RX ORDER — OXYCODONE HYDROCHLORIDE 5 MG/1
5 TABLET ORAL EVERY 4 HOURS
Refills: 0 | Status: DISCONTINUED | OUTPATIENT
Start: 2020-12-24 | End: 2020-12-24

## 2020-12-24 RX ORDER — OXYCODONE HYDROCHLORIDE 5 MG/1
10 TABLET ORAL EVERY 4 HOURS
Refills: 0 | Status: DISCONTINUED | OUTPATIENT
Start: 2020-12-24 | End: 2020-12-24

## 2020-12-24 RX ORDER — METOPROLOL TARTRATE 50 MG
25 TABLET ORAL
Refills: 0 | Status: DISCONTINUED | OUTPATIENT
Start: 2020-12-24 | End: 2020-12-25

## 2020-12-24 RX ORDER — INSULIN LISPRO 100/ML
VIAL (ML) SUBCUTANEOUS
Refills: 0 | Status: DISCONTINUED | OUTPATIENT
Start: 2020-12-24 | End: 2020-12-25

## 2020-12-24 RX ORDER — OXYCODONE HYDROCHLORIDE 5 MG/1
5 TABLET ORAL EVERY 4 HOURS
Refills: 0 | Status: DISCONTINUED | OUTPATIENT
Start: 2020-12-24 | End: 2020-12-25

## 2020-12-24 RX ADMIN — OXYCODONE HYDROCHLORIDE 10 MILLIGRAM(S): 5 TABLET ORAL at 10:15

## 2020-12-24 RX ADMIN — OXYCODONE HYDROCHLORIDE 10 MILLIGRAM(S): 5 TABLET ORAL at 14:14

## 2020-12-24 RX ADMIN — Medication 975 MILLIGRAM(S): at 23:01

## 2020-12-24 RX ADMIN — LINEZOLID 300 MILLIGRAM(S): 600 INJECTION, SOLUTION INTRAVENOUS at 18:32

## 2020-12-24 RX ADMIN — Medication 120 MILLIGRAM(S): at 05:56

## 2020-12-24 RX ADMIN — PIPERACILLIN AND TAZOBACTAM 25 GRAM(S): 4; .5 INJECTION, POWDER, LYOPHILIZED, FOR SOLUTION INTRAVENOUS at 07:05

## 2020-12-24 RX ADMIN — ENOXAPARIN SODIUM 40 MILLIGRAM(S): 100 INJECTION SUBCUTANEOUS at 12:41

## 2020-12-24 RX ADMIN — PIPERACILLIN AND TAZOBACTAM 25 GRAM(S): 4; .5 INJECTION, POWDER, LYOPHILIZED, FOR SOLUTION INTRAVENOUS at 22:49

## 2020-12-24 RX ADMIN — OXYCODONE HYDROCHLORIDE 5 MILLIGRAM(S): 5 TABLET ORAL at 18:33

## 2020-12-24 RX ADMIN — HYDROMORPHONE HYDROCHLORIDE 1 MILLIGRAM(S): 2 INJECTION INTRAMUSCULAR; INTRAVENOUS; SUBCUTANEOUS at 01:46

## 2020-12-24 RX ADMIN — LINEZOLID 300 MILLIGRAM(S): 600 INJECTION, SOLUTION INTRAVENOUS at 06:02

## 2020-12-24 RX ADMIN — HYDROMORPHONE HYDROCHLORIDE 1 MILLIGRAM(S): 2 INJECTION INTRAMUSCULAR; INTRAVENOUS; SUBCUTANEOUS at 02:34

## 2020-12-24 RX ADMIN — OXYCODONE HYDROCHLORIDE 10 MILLIGRAM(S): 5 TABLET ORAL at 09:21

## 2020-12-24 RX ADMIN — OXYCODONE HYDROCHLORIDE 10 MILLIGRAM(S): 5 TABLET ORAL at 15:12

## 2020-12-24 RX ADMIN — Medication 975 MILLIGRAM(S): at 18:33

## 2020-12-24 RX ADMIN — OXYCODONE HYDROCHLORIDE 5 MILLIGRAM(S): 5 TABLET ORAL at 19:30

## 2020-12-24 RX ADMIN — PIPERACILLIN AND TAZOBACTAM 25 GRAM(S): 4; .5 INJECTION, POWDER, LYOPHILIZED, FOR SOLUTION INTRAVENOUS at 14:14

## 2020-12-24 NOTE — DISCHARGE NOTE PROVIDER - NSDCMRMEDTOKEN_GEN_ALL_CORE_FT
escitalopram 5 mg oral tablet: 1 tab(s) orally once a day  Januvia 25 mg oral tablet: 1 tab(s) orally once a day  levothyroxine 100 mcg (0.1 mg) oral tablet: 1 tab(s) orally once a day  Livalo 4 mg oral tablet: 1 tab(s) orally once a day  metFORMIN 500 mg oral tablet, extended release: 1 tab(s) orally once a day  metoprolol succinate 50 mg oral tablet, extended release: 1 tab(s) orally once a day  Xarelto 20 mg oral tablet: 1 tab(s) orally once a day in the evening with a meal   Augmentin 875 mg-125 mg oral tablet: 1 tab(s) orally every 12 hours   escitalopram 5 mg oral tablet: 1 tab(s) orally once a day  Januvia 25 mg oral tablet: 1 tab(s) orally once a day  levothyroxine 100 mcg (0.1 mg) oral tablet: 1 tab(s) orally once a day  linezolid 600 mg oral tablet: 1 tab(s) orally every 12 hours   Livalo 4 mg oral tablet: 1 tab(s) orally once a day  metFORMIN 500 mg oral tablet, extended release: 1 tab(s) orally once a day  metoprolol succinate 50 mg oral tablet, extended release: 1 tab(s) orally once a day  Xarelto 20 mg oral tablet: 1 tab(s) orally once a day in the evening with a meal   Augmentin 875 mg-125 mg oral tablet: 1 tab(s) orally every 12 hours   escitalopram 5 mg oral tablet: 1 tab(s) orally once a day  Januvia 25 mg oral tablet: 1 tab(s) orally once a day  levothyroxine 100 mcg (0.1 mg) oral tablet: 1 tab(s) orally once a day  linezolid 600 mg oral tablet: 1 tab(s) orally every 12 hours   Livalo 4 mg oral tablet: 1 tab(s) orally once a day  metFORMIN 500 mg oral tablet, extended release: 1 tab(s) orally once a day  metoprolol succinate 50 mg oral tablet, extended release: 1 tab(s) orally once a day  oxyCODONE 5 mg oral tablet: 1 tab(s) orally every 6 hours MDD:4 tabs  Xarelto 20 mg oral tablet: 1 tab(s) orally once a day in the evening with a meal

## 2020-12-24 NOTE — DISCHARGE NOTE PROVIDER - NSDCFUADDAPPT_GEN_ALL_CORE_FT
Pt to call Dr. Lyon's office on Monday to help set up visiting nursing services to assist patient with dressing changes.

## 2020-12-24 NOTE — PROGRESS NOTE ADULT - ASSESSMENT
80 year old with small bowel diverticulitis with  associated abscess vs anastamotic leak, s/p surgery on 12/19 with bowel resection/repair.    Cultures with polymicrobial growth including yeast/ E coli/ VRE  Yeast and VRE are less likely pathogens.    I think we can stop fluconazole.    Continue zosyn. Add linezolid  Trend WBC     If leukocytosis  not improving, repeat CT    Continue abx through 12/31.  If stable for discahrge, can finish course with augmentin / linezolid.    Patient should not resume lexapro until linezolid course completed. Discussed this with pt.     Call the ID service with questions or concerns over the weekend.  [phone]

## 2020-12-24 NOTE — PROGRESS NOTE ADULT - ASSESSMENT
Patient is a 80 year old female with a PMHx of DM2, OA, hypothyroidism, cholecystectomy, VHR, jejunal diverticulitis (S/P small bowel resection 2012), HTN, HLD, AFib (on Xarelto) who presented with abdominal pain x4 days.  She was found to have a contained small bowel perforation vs. anastomotic leak.  Patient is now S/P exploratory laparotomy with bowel resection on 12/18/20.      PLAN:    - NPO/IVF  - D5 + LR @100cc/hr  - Continue with IV Diflucan  - Daily dressing changes  - Out of bed  - Pain control  - VTE prophylaxis with Lovenox subcutaneous    [facility], [id number] Patient is a 80 year old female with a PMHx of DM2, OA, hypothyroidism, cholecystectomy, VHR, jejunal diverticulitis (S/P small bowel resection 2012), HTN, HLD, AFib (on Xarelto) who presented with abdominal pain x4 days.  She was found to have a contained small bowel perforation vs. anastomotic leak.  Patient is now S/P exploratory laparotomy with bowel resection on 12/18/20.      PLAN:    - Diet: advance to CLD  - Leukocytosis remains 15.3, consider CT tomorrow  - D5 + LR @100cc/hr  - Continue with IV Diflucan  - Daily dressing changes  - Out of bed  - Pain control  - VTE prophylaxis with Lovenox subcutaneous    [facility], [id number]

## 2020-12-24 NOTE — DISCHARGE NOTE PROVIDER - HOSPITAL COURSE
This patient is a 80F hx DM2, OA, hypothyroid, cholecystectomy, VHR, jejunal diverticulitis s/p SBR (2012), HTN, HLD, Afib on Xarelto presented to [facility] on 12/15/20 with abdominal pain x4 days. Patient reports pain similar to previous episodes of diverticulitis. She called her PCP and was prescribed Cipro/Flagyl and advised to only drink clears. She went for an outpatient CT earlier today which showed a perforation at the site of the anastomosis. She came to the ED for further evaluation. Repeat CT abdomen pelvis in ED revealed Perforated small bowel in the left abdomen with partially walled off fluid collection. Differential includes perforated small bowel diverticulitis versus anastomotic breakdown given the location of the perforation. Patient admitted to surgery service under care of Dr. Lyon, made NPO, given IV fluids and IV antibiotics.  IR consulted for possible drainage of fluid collection, although collection did not appear walled off/amendable to IR drainage at the time. Patient continued with non-operative management 12/15-12/17. On 12/18 patient taken to OR for exploratory laparotomy with lysis of adhesions. Intraoperatively, small bowel appeared matted together, phlegmon identified in small bowel mesentry with associated abscess draining pus, two small bowel enterotomies made and repaired in two layers, small bowel resected and side to side staple anastomosis performed, small bowel ran through no further injuries found, hemostasis achieved. Patient tolerated the procedure well, without complication. Patient remained hemodynamically stable in the PACU and transferred to the surgical floor. Patient remained NPO with NGT post-operatively through 12/23. On 12/23, patient passed NGT clamp trial and NGT was removed. Diet was restarted and advanced as tolerated. OR cultures + VRE enterococcus facieum and Ecoli.  Infectious disease was consulted and antibiotics were adjusted per culture/sensitivities. Pain control was transitioned from IV to PO pain meds. At this time, patient is currently ambulating, voiding, tolerating a regular diet. Pain well controlled on PO pain meds. Patient has been deemed stable for discharge home with follow up as an outpatient.

## 2020-12-24 NOTE — DISCHARGE NOTE PROVIDER - CARE PROVIDER_API CALL
Mick Lyon  COLON/RECTAL SURGERY  [address] [place] [address]  Phone: [phone]  Fax: [phone]  Follow Up Time: 2 weeks

## 2020-12-24 NOTE — DISCHARGE NOTE PROVIDER - NSDCCPCAREPLAN_GEN_ALL_CORE_FT
PRINCIPAL DISCHARGE DIAGNOSIS  Diagnosis: Perforation bowel  Assessment and Plan of Treatment: WOUND CARE:  Please keep incisions clean and dry. Please do not Scrub or rub incisions. Do not use lotion or powder on incisions.   BATHING: You may shower and/or sponge bathe. You may use warm soapy water in the shower and rinse, pat dry.  ACTIVITY: No heavy lifting or straining. Otherwise, you may return to your usual level of physical activity. If you are taking narcotic pain medication DO NOT drive a car, operate machinery or make important decisions.  DIET: Return to your usual diet.  NOTIFY YOUR SURGEON IF YOU HAVE: any bleeding that does not stop, any pus draining from your wound(s), any fever (over 100.4 F) persistent nausea/vomiting, or if your pain is not controlled on your discharge pain medications, unable to urinate.  Please follow up with your primary care physician in one week regarding your hospitalization, bring copies of your discharge paperwork.  Please follow up with your surgeon, . Please call to make an appointment. [phone]       PRINCIPAL DISCHARGE DIAGNOSIS  Diagnosis: Perforation bowel  Assessment and Plan of Treatment: WOUND CARE:  Please keep incisions clean and dry. Please do not Scrub or rub incisions. Do not use lotion or powder on incisions.  Midline abdominal incision packing (kerlex or gauze) to be changed daily. Gauze dressing to be placed over site.   BATHING: You may shower and/or sponge bathe. You may use warm soapy water in the shower and rinse, pat dry.  ACTIVITY: No heavy lifting or straining. Otherwise, you may return to your usual level of physical activity. If you are taking narcotic pain medication DO NOT drive a car, operate machinery or make important decisions.  DIET: Return to your usual diet.  NOTIFY YOUR SURGEON IF YOU HAVE: any bleeding that does not stop, any pus draining from your wound(s), any fever (over 100.4 F) persistent nausea/vomiting, or if your pain is not controlled on your discharge pain medications, unable to urinate.  Please follow up with your primary care physician in one week regarding your hospitalization, bring copies of your discharge paperwork.  Please follow up with your surgeon, . Please call to make an appointment. [phone]       PRINCIPAL DISCHARGE DIAGNOSIS  Diagnosis: Perforation bowel  Assessment and Plan of Treatment: WOUND CARE:  Please keep incisions clean and dry. Please do not Scrub or rub incisions. Do not use lotion or powder on incisions.  Midline abdominal incision packing (kerlex or gauze) to be changed daily. Gauze dressing to be placed over site.  Pt's son-in law will perform the dressing changes for the next 48 hours, until VNS can be provided.  BATHING: You may shower and/or sponge bathe. You may use warm soapy water in the shower and rinse, pat dry.  ACTIVITY: No heavy lifting or straining. Otherwise, you may return to your usual level of physical activity. If you are taking narcotic pain medication DO NOT drive a car, operate machinery or make important decisions.  DIET: Return to your usual diet.  NOTIFY YOUR SURGEON IF YOU HAVE: any bleeding that does not stop, any pus draining from your wound(s), any fever (over 100.4 F) persistent nausea/vomiting, or if your pain is not controlled on your discharge pain medications, unable to urinate.  Please follow up with your primary care physician in one week regarding your hospitalization, bring copies of your discharge paperwork.  Please follow up with your surgeon, . Please call to make an appointment. [phone]

## 2020-12-24 NOTE — DISCHARGE NOTE PROVIDER - NSDCCPTREATMENT_GEN_ALL_CORE_FT
PRINCIPAL PROCEDURE  Procedure: Exploratory laparotomy with bowel resection  Findings and Treatment:        PRINCIPAL PROCEDURE  Procedure: Exploratory laparotomy with bowel resection  Findings and Treatment: ex. lap, extensive lysis of adhesions, small bowel matted together, phlegmon identified in small bowel mesentry with associated abscess draining pus, two small bowel enterotomies made and repaired in two layers, small bowel resected and side to side staple anastomosis performed, small bowel ran through no further injuries found, hemostasis achieved

## 2020-12-25 ENCOUNTER — TRANSCRIPTION ENCOUNTER (OUTPATIENT)
Age: 80
End: 2020-12-25

## 2020-12-25 VITALS
DIASTOLIC BLOOD PRESSURE: 49 MMHG | HEART RATE: 59 BPM | SYSTOLIC BLOOD PRESSURE: 155 MMHG | TEMPERATURE: 98 F | OXYGEN SATURATION: 97 % | RESPIRATION RATE: 18 BRPM

## 2020-12-25 LAB
ANION GAP SERPL CALC-SCNC: 10 MMOL/L — SIGNIFICANT CHANGE UP (ref 7–14)
BLD GP AB SCN SERPL QL: NEGATIVE — SIGNIFICANT CHANGE UP
BUN SERPL-MCNC: 7 MG/DL — SIGNIFICANT CHANGE UP (ref 7–23)
CALCIUM SERPL-MCNC: 9.4 MG/DL — SIGNIFICANT CHANGE UP (ref 8.4–10.5)
CHLORIDE SERPL-SCNC: 102 MMOL/L — SIGNIFICANT CHANGE UP (ref 98–107)
CO2 SERPL-SCNC: 26 MMOL/L — SIGNIFICANT CHANGE UP (ref 22–31)
CREAT SERPL-MCNC: 0.7 MG/DL — SIGNIFICANT CHANGE UP (ref 0.5–1.3)
GLUCOSE BLDC GLUCOMTR-MCNC: 120 MG/DL — HIGH (ref 70–99)
GLUCOSE BLDC GLUCOMTR-MCNC: 178 MG/DL — HIGH (ref 70–99)
GLUCOSE SERPL-MCNC: 99 MG/DL — SIGNIFICANT CHANGE UP (ref 70–99)
HCT VFR BLD CALC: 31.2 % — LOW (ref 34.5–45)
HGB BLD-MCNC: 9.8 G/DL — LOW (ref 11.5–15.5)
MAGNESIUM SERPL-MCNC: 1.9 MG/DL — SIGNIFICANT CHANGE UP (ref 1.6–2.6)
MCHC RBC-ENTMCNC: 27.5 PG — SIGNIFICANT CHANGE UP (ref 27–34)
MCHC RBC-ENTMCNC: 31.4 GM/DL — LOW (ref 32–36)
MCV RBC AUTO: 87.6 FL — SIGNIFICANT CHANGE UP (ref 80–100)
NRBC # BLD: 0 /100 WBCS — SIGNIFICANT CHANGE UP
NRBC # FLD: 0 K/UL — SIGNIFICANT CHANGE UP
PHOSPHATE SERPL-MCNC: 3.4 MG/DL — SIGNIFICANT CHANGE UP (ref 2.5–4.5)
PLATELET # BLD AUTO: 581 K/UL — HIGH (ref 150–400)
POTASSIUM SERPL-MCNC: 4.2 MMOL/L — SIGNIFICANT CHANGE UP (ref 3.5–5.3)
POTASSIUM SERPL-SCNC: 4.2 MMOL/L — SIGNIFICANT CHANGE UP (ref 3.5–5.3)
RBC # BLD: 3.56 M/UL — LOW (ref 3.8–5.2)
RBC # FLD: 13.7 % — SIGNIFICANT CHANGE UP (ref 10.3–14.5)
RH IG SCN BLD-IMP: NEGATIVE — SIGNIFICANT CHANGE UP
SODIUM SERPL-SCNC: 138 MMOL/L — SIGNIFICANT CHANGE UP (ref 135–145)
WBC # BLD: 10.71 K/UL — HIGH (ref 3.8–10.5)
WBC # FLD AUTO: 10.71 K/UL — HIGH (ref 3.8–10.5)

## 2020-12-25 RX ORDER — OXYCODONE HYDROCHLORIDE 5 MG/1
1 TABLET ORAL
Qty: 12 | Refills: 0
Start: 2020-12-25 | End: 2020-12-27

## 2020-12-25 RX ORDER — OXYCODONE HYDROCHLORIDE 5 MG/1
1 TABLET ORAL
Qty: 12 | Refills: 0
Start: 2020-12-25 | End: 2020-12-30

## 2020-12-25 RX ORDER — LINEZOLID 600 MG/300ML
1 INJECTION, SOLUTION INTRAVENOUS
Qty: 12 | Refills: 0
Start: 2020-12-25 | End: 2020-12-30

## 2020-12-25 RX ORDER — METOPROLOL TARTRATE 50 MG
25 TABLET ORAL
Refills: 0 | Status: DISCONTINUED | OUTPATIENT
Start: 2020-12-25 | End: 2020-12-25

## 2020-12-25 RX ADMIN — Medication 100 MICROGRAM(S): at 06:46

## 2020-12-25 RX ADMIN — OXYCODONE HYDROCHLORIDE 5 MILLIGRAM(S): 5 TABLET ORAL at 16:30

## 2020-12-25 RX ADMIN — LINEZOLID 300 MILLIGRAM(S): 600 INJECTION, SOLUTION INTRAVENOUS at 06:46

## 2020-12-25 RX ADMIN — OXYCODONE HYDROCHLORIDE 5 MILLIGRAM(S): 5 TABLET ORAL at 04:16

## 2020-12-25 RX ADMIN — OXYCODONE HYDROCHLORIDE 5 MILLIGRAM(S): 5 TABLET ORAL at 05:00

## 2020-12-25 RX ADMIN — Medication 975 MILLIGRAM(S): at 06:45

## 2020-12-25 RX ADMIN — OXYCODONE HYDROCHLORIDE 5 MILLIGRAM(S): 5 TABLET ORAL at 15:34

## 2020-12-25 RX ADMIN — Medication 1: at 07:47

## 2020-12-25 RX ADMIN — OXYCODONE HYDROCHLORIDE 5 MILLIGRAM(S): 5 TABLET ORAL at 10:12

## 2020-12-25 RX ADMIN — PIPERACILLIN AND TAZOBACTAM 25 GRAM(S): 4; .5 INJECTION, POWDER, LYOPHILIZED, FOR SOLUTION INTRAVENOUS at 06:45

## 2020-12-25 RX ADMIN — Medication 975 MILLIGRAM(S): at 12:41

## 2020-12-25 RX ADMIN — OXYCODONE HYDROCHLORIDE 5 MILLIGRAM(S): 5 TABLET ORAL at 11:10

## 2020-12-25 RX ADMIN — ENOXAPARIN SODIUM 40 MILLIGRAM(S): 100 INJECTION SUBCUTANEOUS at 12:40

## 2020-12-25 NOTE — CHART NOTE - NSCHARTNOTEFT_GEN_A_CORE
CAPRINI SCORE    AGE RELATED RISK FACTORS                                                             [ ] Age 41-60 years                                            (1 Point)  [ ] Age: 61-74 years                                           (2 Points)                 [ X  ] Age= 75 years                                                (3 Points)             DISEASE RELATED RISK FACTORS                                                       [ ] Edema in the lower extremities                 (1 Point)                     [ ] Varicose veins                                               (1 Point)                                 [ X ] BMI > 25 Kg/m2                                            (1 Point)                                  [ ] Serious infection (ie PNA)                            (1 Point)                     [ ] Lung disease ( COPD, Emphysema)            (1 Point)                                                                          [ ] Acute myocardial infarction                         (1 Point)                  [ ] Congestive heart failure (in the previous month)  (1 Point)         [ ] Inflammatory bowel disease                            (1 Point)                  [ ] Central venous access, PICC or Port               (2 points)       (within the last month)                                                                [ ] Stroke (in the previous month)                        (5 Points)    [ ] Previous or present malignancy                       (2 points)                                                                                                                                                         HEMATOLOGY RELATED FACTORS                                                         [ ] Prior episodes of VTE                                     (3 Points)                     [ ] Positive family history for VTE                      (3 Points)                  [ ] Prothrombin 31417 A                                     (3 Points)                     [ ] Factor V Leiden                                                (3 Points)                        [ ] Lupus anticoagulants                                      (3 Points)                                                           [ ] Anticardiolipin antibodies                              (3 Points)                                                       [ ] High homocysteine in the blood                   (3 Points)                                             [ ] Other congenital or acquired thrombophilia      (3 Points)                                                [ ] Heparin induced thrombocytopenia                  (3 Points)                                        MOBILITY RELATED FACTORS  [ ] Bed rest                                                         (1 Point)  [ ] Plaster cast                                                    (2 points)  [ ] Bed bound for more than 72 hours           (2 Points)    GENDER SPECIFIC FACTORS  [ ] Pregnancy or had a baby within the last month   (1 Point)  [ ] Post-partum < 6 weeks                                   (1 Point)  [ ] Hormonal therapy  or oral contraception   (1 Point)  [ ] History of pregnancy complications              (1 point)  [ ] Unexplained or recurrent              (1 Point)    OTHER RISK FACTORS                                           (1 Point)  BMI >40, smoking, diabetes requiring insulin, chemotherapy  blood transfusions and length of surgery over 2 hours    SURGERY RELATED RISK FACTORS  [ ]  Section within the last month     (1 Point)  [ ] Minor surgery                                                  (1 Point)  [ ] Arthroscopic surgery                                       (2 Points)  [ ] Planned major surgery lasting more            (2 Points)      than 45 minutes     [ ] Elective hip or knee joint replacement       (5 points)       surgery                                                TRAUMA RELATED RISK FACTORS  [ ] Fracture of the hip, pelvis, or leg                       (5 Points)  [ ] Spinal cord injury resulting in paralysis             (5 points)       (in the previous month)    [ ] Paralysis  (less than 1 month)                             (5 Points)  [ ] Multiple Trauma within 1 month                        (5 Points)    Total Score [    4    ]    Caprini Score 0-2: Low Risk, NO VTE prophylaxis required for most patients, encourage ambulation  Caprini Score 3-6: Moderate Risk , pharmacologic VTE prophylaxis is indicated for most patients (in the absence of contraindications)  Caprini Score Greater than or =7: High risk, pharmocologic VTE prophylaxis indicated for mot patients (in the absence of contraindications)
Preop Dx: perforated diverticulitis   Surgeon: Dr. bill  Procedure: Ex lap possible bowel resection     Vital Signs Last 24 Hrs  T(C): 36.6 (17 Dec 2020 13:54), Max: 37.2 (16 Dec 2020 17:37)  T(F): 97.9 (17 Dec 2020 13:54), Max: 98.9 (16 Dec 2020 17:37)  HR: 59 (17 Dec 2020 13:54) (59 - 62)  BP: 160/59 (17 Dec 2020 13:54) (148/59 - 163/50)  BP(mean): --  RR: 20 (17 Dec 2020 13:54) (16 - 20)  SpO2: 98% (17 Dec 2020 13:54) (97% - 99%)                        10.9   9.53  )-----------( 283      ( 17 Dec 2020 08:13 )             33.5     12-17    140  |  106  |  5<L>  ----------------------------<  131<H>  3.9   |  24  |  0.61    Ca    9.1      17 Dec 2020 08:13  Phos  2.3     12-17  Mg     2.1     12-17        Daily     Daily     EKG:  CXR:   Type and Screen:         A/P: 80y Female     - OR 12/18 for ex lap possible bowel resection   - NPO past midnight, except medications  - IVF while NPO  - Consent signed and in chart
Pt ready for discharge pending linezolid preauthorization approval.   Explained to patient that due to the holiday, insurance company staff is limited, and the preauthorization likely won't happen today. Pt and  greatly desired to be discharged home today, so pt and  decided to pay out of pocket for the Linezolid, and will pick it up from Pharmacy today after d/c. .  They will call Dr. Lyon's office on Monday to help organize repayment plan by insurance.

## 2020-12-25 NOTE — PROGRESS NOTE ADULT - ASSESSMENT
80 year old female with a PMHx of DM2, OA, hypothyroidism, cholecystectomy, VHR, jejunal diverticulitis (S/P small bowel resection 2012), HTN, HLD, AFib (on Xarelto) who presented with abdominal pain x4 days.  She was found to have a contained small bowel perforation vs. anastomotic leak.  Patient is now S/P exploratory laparotomy with bowel resection on 12/18/20.      PLAN:    - Diet: c/w Reg diet  - f/u am cbc, if leukocytosis remains, consider CT today  - Continue with IV Diflucan  - Daily dressing changes  - Out of bed  - Pain control  - VTE prophylaxis with Lovenox subcutaneous    [facility], [id number]

## 2020-12-25 NOTE — DISCHARGE NOTE NURSING/CASE MANAGEMENT/SOCIAL WORK - NSDCPNINST_GEN_ALL_CORE
WOUND CARE:  Please keep incisions clean and dry. Please do not Scrub or rub incisions. Do not use lotion or powder on incisions.  Midline abdominal incision packing (kerlex or gauze) to be changed daily. Gauze dressing to be placed over site.  Pt's son-in law will perform the dressing changes for the next 48 hours, until VNS can be provided.  BATHING: You may shower and/or sponge bathe. You may use warm soapy water in the shower and rinse, pat dry.  ACTIVITY: No heavy lifting or straining. Otherwise, you may return to your usual level of physical activity. If you are taking narcotic pain medication DO NOT drive a car, operate machinery or make important decisions.  DIET: Return to your usual diet.  NOTIFY YOUR SURGEON IF YOU HAVE: any bleeding that does not stop, any pus draining from your wound(s), any fever (over 100.4 F) persistent nausea/vomiting, or if your pain is not controlled on your discharge pain medications, unable to urinate.  Please follow up with your primary care physician in one week regarding your hospitalization, bring copies of your discharge paperwork.

## 2020-12-25 NOTE — PROGRESS NOTE ADULT - REASON FOR ADMISSION
Contained SB perforation vs. anastomotic leak
Contained small bowel perforation vs. anastomotic leak
Contained SB perforation vs. anastomotic leak
Contained SB perforation vs. anastomotic leak

## 2020-12-25 NOTE — DISCHARGE NOTE NURSING/CASE MANAGEMENT/SOCIAL WORK - PATIENT PORTAL LINK FT
You can access the FollowMyHealth Patient Portal offered by [facility] by registering at the following website: [email or web address]. By joining [facility]’s FollowMyHealth portal, you will also be able to view your health information using other applications (apps) compatible with our system.

## 2020-12-25 NOTE — PROGRESS NOTE ADULT - SUBJECTIVE AND OBJECTIVE BOX
SURGERY PROGRESS NOTE    SUBJECTIVE / 24H EVENTS:  Patient seen and examined on morning rounds. No acute events overnight.  Resting comfortably in bed, minimal abd pain, tolerating diet, no n/v.      OBJECTIVE:  VITAL SIGNS:  T(C): 37 (12-25-20 @ 01:18), Max: 37 (12-24-20 @ 14:26)  HR: 60 (12-25-20 @ 01:18) (60 - 61)  BP: 139/44 (12-25-20 @ 01:18) (138/44 - 148/60)  RR: 16 (12-25-20 @ 01:18) (16 - 17)  SpO2: 96% (12-25-20 @ 01:18) (96% - 100%)  Daily     Daily   POCT Blood Glucose.: 98 mg/dL (12-24-20 @ 21:12)  POCT Blood Glucose.: 98 mg/dL (12-24-20 @ 17:14)  POCT Blood Glucose.: 110 mg/dL (12-24-20 @ 11:52)    PHYSICAL EXAM:  General: NAD, lying comfortably in bed  Abdomen: Soft, nontender, incision c/d/i  Extremities: no edema        12-24-20 @ 07:01  -  12-25-20 @ 07:00  --------------------------------------------------------  IN:    Oral Fluid: 1 mL  Total IN: 1 mL    OUT:  Total OUT: 0 mL    Total NET: 1 mL          LAB VALUES:  12-25    138  |  102  |  7   ----------------------------<  99  4.2   |  26  |  0.70    Ca    9.4      25 Dec 2020 06:29  Phos  3.4     12-25  Mg     1.9     12-25                                 9.8    10.71 )-----------( 581      ( 25 Dec 2020 06:29 )             31.2           MEDICATIONS  (STANDING):  acetaminophen   Tablet .. 975 milliGRAM(s) Oral every 6 hours  dextrose 40% Gel 15 Gram(s) Oral once  dextrose 50% Injectable 25 Gram(s) IV Push once  dextrose 50% Injectable 12.5 Gram(s) IV Push once  dextrose 50% Injectable 25 Gram(s) IV Push once  enoxaparin Injectable 40 milliGRAM(s) SubCutaneous daily  glucagon  Injectable 1 milliGRAM(s) IntraMuscular once  insulin lispro (ADMELOG) corrective regimen sliding scale   SubCutaneous Before meals and at bedtime  levothyroxine 100 MICROGram(s) Oral daily  linezolid  IVPB      linezolid  IVPB 600 milliGRAM(s) IV Intermittent every 12 hours  metoprolol tartrate 25 milliGRAM(s) Oral two times a day  piperacillin/tazobactam IVPB.. 3.375 Gram(s) IV Intermittent every 8 hours    MEDICATIONS  (PRN):  oxyCODONE    IR 5 milliGRAM(s) Oral every 4 hours PRN moderate to Severe Pain (4- 10)           
Surgery Daily Progress Note  =====================================================  Interval / Overnight Events: No acute events overnight.       HPI:  Patient is a 80 year old female with a PMHx of DM2, OA, hypothyroidism, cholecystectomy, VHR, jejunal diverticulitis (S/P small bowel resection 2012), HTN, HLD, AFib (on Xarelto) who presented with abdominal pain x4 days.  Patient reports pain similar to previous episodes of diverticulitis.  She called her PCP on Saturday and was prescribed Cipro / Flagyl and advised to only drink clears.  Patient went for an outpatient CT which showed a perforation at the site of the anastomosis. (15 Dec 2020 05:06)      PAST MEDICAL & SURGICAL HISTORY:  DM (diabetes mellitus)  Spinal stenosis of lumbar region  Sleep Apnea  Meniscus tear lateral left knee  h/o Prolapsed Uterus  h/o Pulmonary Embolus 30 yrs ago  h/o Phlebitis 30 yrs ago  h/o Cholecystitis  Hiatal Hernia  Hyperlipidemia  Arthritis  Diverticulitis  Adult Hypothyroidism  Right hand surgery 1999 , h/o arthritis  Left hand surgery 2004  h/o arthritis hand  h/o cholecystectomy  2010  S/P Hysterectomy  S/P Appendectomy  S/P Colon Resection      ALLERGIES:  No Known Allergies    --------------------------------------------------------------------------------------    MEDICATIONS:    Neurologic Medications  escitalopram 5 milliGRAM(s) Oral daily    Cardiovascular Medications  metoprolol succinate ER 50 milliGRAM(s) Oral daily    Gastrointestinal Medications  dextrose 5% + lactated ringers 1000 milliLiter(s) IV Continuous <Continuous>  dextrose 5%. 1000 milliLiter(s) IV Continuous <Continuous>  dextrose 5%. 1000 milliLiter(s) IV Continuous <Continuous>    Hematologic/Oncologic Medications  enoxaparin Injectable 40 milliGRAM(s) SubCutaneous daily    Antimicrobial/Immunologic Medications  piperacillin/tazobactam IVPB.. 3.375 Gram(s) IV Intermittent every 8 hours    Endocrine/Metabolic Medications  atorvastatin 20 milliGRAM(s) Oral at bedtime  dextrose 40% Gel 15 Gram(s) Oral once  dextrose 50% Injectable 25 Gram(s) IV Push once  dextrose 50% Injectable 12.5 Gram(s) IV Push once  dextrose 50% Injectable 25 Gram(s) IV Push once  glucagon  Injectable 1 milliGRAM(s) IntraMuscular once  insulin lispro (ADMELOG) corrective regimen sliding scale   SubCutaneous every 6 hours  levothyroxine 100 MICROGram(s) Oral daily    --------------------------------------------------------------------------------------    VITAL SIGNS:  T(C): 36.7 (18 Dec 2020 09:18), Max: 36.9 (17 Dec 2020 21:27)  T(F): 98 (18 Dec 2020 09:18), Max: 98.5 (17 Dec 2020 21:27)  HR: 60 (18 Dec 2020 09:18) (59 - 65)  BP: 177/66 (18 Dec 2020 09:18) (148/59 - 183/67)  RR: 17 (18 Dec 2020 09:18) (16 - 20)  SpO2: 97% (18 Dec 2020 09:18) (97% - 98%)    --------------------------------------------------------------------------------------    INS AND OUTS:    17 Dec 2020 07:01  -  18 Dec 2020 07:00  --------------------------------------------------------  IN:  Total IN: 0 mL    OUT:    Stool (mL): 3 mL    Voided (mL): 700 mL  Total OUT: 703 mL    Total NET: -703 mL    --------------------------------------------------------------------------------------    EXAM    NEUROLOGY  Exam: Normal, in no acute distress.    HEENT  Exam: Normocephalic, atraumatic.    RESPIRATORY  Exam: Normal expansion / effort.    CARDIOVASCULAR  Exam: S1, S2.  Regular rate and rhythm.    GI/NUTRITION  Exam: Abdomen soft, Non-tender, Non-distended.  Current Diet: NPO    MUSCULOSKELETAL  Exam: All extremities moving spontaneously without limitations.      METABOLIC / FLUIDS / ELECTROLYTES  dextrose 5% + lactated ringers 1000 milliLiter(s) IV Continuous <Continuous>  dextrose 5%. 1000 milliLiter(s) IV Continuous <Continuous>  dextrose 5%. 1000 milliLiter(s) IV Continuous <Continuous>      HEMATOLOGIC  [x] VTE Prophylaxis: enoxaparin Injectable 40 milliGRAM(s) SubCutaneous daily      INFECTIOUS DISEASE  Antimicrobials/Immunologic Medications:  piperacillin/tazobactam IVPB.. 3.375 Gram(s) IV Intermittent every 8 hours    --------------------------------------------------------------------------------------
Doing well having bms. Abdomen soft. wound clean. To advance diet chk wbc. Dheld
Had flatus, wound clean, Ambulating . WBC 15,000. NG-800. To have clamp  trail. Dheld
SURGERY PROGRESS NOTE    SUBJECTIVE / 24H EVENTS:  Patient seen and examined on morning rounds. No acute events overnight.  Resting comfortably in bed, gi fx +,-. NGT d/nancy yesterday, NPO, No n/v.      OBJECTIVE:  VITAL SIGNS:  T(C): 36.9 (20 @ 05:48), Max: 37.3 (20 @ 21:06)  HR: 61 (20 @ 05:48) (59 - 69)  BP: 134/53 (20 @ 05:48) (134/53 - 178/66)  RR: 18 (20 @ 05:48) (17 - 18)  SpO2: 99% (20 @ 05:48) (99% - 100%)  Daily     Daily Weight in k.1 (24 Dec 2020 00:46)  POCT Blood Glucose.: 127 mg/dL (20 @ 06:18)  POCT Blood Glucose.: 127 mg/dL (20 @ 00:32)  POCT Blood Glucose.: 129 mg/dL (20 @ 17:59)      PHYSICAL EXAM:  General: NAD, lying comfortably in bed  Abdomen: Soft, nontender, incision c/d/i  Extremities: no edema      20 @ 07:01  -  20 @ 07:00  --------------------------------------------------------  IN:  Total IN: 0 mL    OUT:    Nasogastric/Oral tube (mL): 90 mL    Stool (mL): 2 mL    Voided (mL): 1200 mL  Total OUT: 1292 mL    Total NET: -1292 mL          LAB VALUES:      134<L>  |  100  |  6<L>  ----------------------------<  121<H>  4.5   |  26  |  0.71    Ca    9.3      24 Dec 2020 06:33  Phos  2.9       Mg     1.9                                      9.6    15.33 )-----------( 565      ( 24 Dec 2020 06:33 )             30.9       PT/INR - ( 23 Dec 2020 05:36 )   PT: 13.2 sec;   INR: 1.16 ratio         PTT - ( 23 Dec 2020 05:36 )  PTT:30.1 sec          MEDICATIONS  (STANDING):  dextrose 40% Gel 15 Gram(s) Oral once  dextrose 5% + lactated ringers 1000 milliLiter(s) (100 mL/Hr) IV Continuous <Continuous>  dextrose 50% Injectable 25 Gram(s) IV Push once  dextrose 50% Injectable 12.5 Gram(s) IV Push once  dextrose 50% Injectable 25 Gram(s) IV Push once  enoxaparin Injectable 40 milliGRAM(s) SubCutaneous daily  glucagon  Injectable 1 milliGRAM(s) IntraMuscular once  insulin lispro (ADMELOG) corrective regimen sliding scale   SubCutaneous every 6 hours  levothyroxine Injectable 75 MICROGram(s) IV Push at bedtime  linezolid  IVPB      linezolid  IVPB 600 milliGRAM(s) IV Intermittent every 12 hours  metoprolol tartrate IVPB 10 milliGRAM(s) IV Intermittent every 8 hours  piperacillin/tazobactam IVPB.. 3.375 Gram(s) IV Intermittent every 8 hours    MEDICATIONS  (PRN):  benzocaine 15 mG/menthol 3.6 mG (Sugar-Free) Lozenge 1 Lozenge Oral three times a day PRN Sore Throat  HYDROmorphone  Injectable 0.5 milliGRAM(s) IV Push every 4 hours PRN Moderate Pain (4 - 6)  HYDROmorphone  Injectable 1 milliGRAM(s) IV Push every 4 hours PRN Severe Pain (7 - 10)           
SURGERY PROGRESS NOTE    SUBJECTIVE / 24H EVENTS:  Patient seen and examined on morning rounds. No acute events overnight.  Resting comfortably in bed, no complaints.      OBJECTIVE:  VITAL SIGNS:  T(C): 37 (20 @ 05:12), Max: 37 (20 @ 05:12)  HR: 65 (20 @ 05:12) (57 - 65)  BP: 154/65 (20 @ 05:12) (134/40 - 154/65)  RR: 17 (20 @ 05:12) (17 - 18)  SpO2: 96% (20 @ 05:12) (96% - 100%)  Daily     Daily Weight in k.2 (16 Dec 2020 01:37)  POCT Blood Glucose.: 113 mg/dL (20 @ 06:24)  POCT Blood Glucose.: 106 mg/dL (20 @ 00:35)  POCT Blood Glucose.: 121 mg/dL (12-15-20 @ 18:12)      PHYSICAL EXAM:  NAD, resting in bed  Alert, responding appropriately  Non labored respirations  Soft, focally tender in left abdomen, ND, no rebound/guarding  [facility]    12-15-20 @ 07:01  -  20 @ 07:00  --------------------------------------------------------  IN:  Total IN: 0 mL    OUT:    Voided (mL): 1725 mL  Total OUT: 1725 mL    Total NET: -1725 mL          LAB VALUES:  12-15    139  |  106  |  12  ----------------------------<  114<H>  3.7   |  23  |  0.62    Ca    8.9      15 Dec 2020 11:34  Phos  2.5     12-15  Mg     1.9     12-15    TPro  7.7  /  Alb  3.8  /  TBili  0.8  /  DBili  x   /  AST  51<H>  /  ALT  66<H>  /  AlkPhos  119  12                               11.0   10.85 )-----------( 223      ( 15 Dec 2020 11:34 )             34.6     LIVER FUNCTIONS - ( 14 Dec 2020 23:48 )  Alb: 3.8 g/dL / Pro: 7.7 g/dL / ALK PHOS: 119 U/L / ALT: 66 U/L / AST: 51 U/L / GGT: x           PT/INR - ( 15 Dec 2020 11:34 )   PT: 20.9 sec;   INR: 1.87 ratio         PTT - ( 15 Dec 2020 11:34 )  PTT:35.9 sec        Urinalysis Basic - ( 14 Dec 2020 23:48 )    Color: Yellow / Appearance: Clear / S.031 / pH: x  Gluc: x / Ketone: Small  / Bili: Negative / Urobili: 6 mg/dL   Blood: x / Protein: 30 mg/dL / Nitrite: Negative   Leuk Esterase: Negative / RBC: 1 /HPF / WBC 3 /HPF   Sq Epi: x / Non Sq Epi: 1 /HPF / Bacteria: Negative        MICROBIOLOGY:    Culture - Blood (collected 15 Dec 2020 02:48)  Source: .Blood Blood-Peripheral  Preliminary Report (16 Dec 2020 03:01):    No growth to date.    Culture - Blood (collected 15 Dec 2020 02:48)  Source: .Blood Blood-Peripheral  Preliminary Report (16 Dec 2020 03:01):    No growth to date.    Culture - Urine (collected 14 Dec 2020 23:30)  Source: .Urine Clean Catch (Midstream)  Final Report (15 Dec 2020 23:13):    <10,000 CFU/mL Normal Urogenital Lissette        MEDICATIONS  (STANDING):  atorvastatin 20 milliGRAM(s) Oral at bedtime  dextrose 40% Gel 15 Gram(s) Oral once  dextrose 5%. 1000 milliLiter(s) (50 mL/Hr) IV Continuous <Continuous>  dextrose 5%. 1000 milliLiter(s) (100 mL/Hr) IV Continuous <Continuous>  dextrose 50% Injectable 25 Gram(s) IV Push once  dextrose 50% Injectable 12.5 Gram(s) IV Push once  dextrose 50% Injectable 25 Gram(s) IV Push once  enoxaparin Injectable 40 milliGRAM(s) SubCutaneous daily  escitalopram 5 milliGRAM(s) Oral daily  glucagon  Injectable 1 milliGRAM(s) IntraMuscular once  insulin lispro (ADMELOG) corrective regimen sliding scale   SubCutaneous every 6 hours  lactated ringers. 1000 milliLiter(s) (100 mL/Hr) IV Continuous <Continuous>  levothyroxine 100 MICROGram(s) Oral daily  metoprolol succinate ER 50 milliGRAM(s) Oral daily  piperacillin/tazobactam IVPB.. 3.375 Gram(s) IV Intermittent every 8 hours    MEDICATIONS  (PRN):           
Stable post op. Wounds clean. To continue Ng await GI function. Dheld
Surgery Progress Note    SUBJECTIVE: Patient is several hours sp exploratory laparotomy w small bowel resection for small bowel diverticular disease. Pt seen and examined at bedside. Patient endorsing abdominal pain at the surgical site and some nausea. No vomiting, chest pain, shortness of breath. NGT placed in OR and replaced in PACU after CXR with immediate return of 100cc of light rust-colored fluid.     Vital Signs Last 24 Hrs  T(C): 36.4 (18 Dec 2020 23:00), Max: 36.9 (18 Dec 2020 18:49)  T(F): 97.5 (18 Dec 2020 23:00), Max: 98.5 (18 Dec 2020 18:49)  HR: 61 (19 Dec 2020 00:00) (59 - 74)  BP: 162/65 (19 Dec 2020 00:00) (143/93 - 191/67)  BP(mean): 91 (19 Dec 2020 00:00) (90 - 122)  RR: 16 (19 Dec 2020 00:00) (14 - 24)  SpO2: 93% (19 Dec 2020 00:00) (93% - 100%)    Physical Exam:  General Appearance: Appears fatigued; in no acute distress, awake, alert, and oriented x3  Respiratory: No labored breathing  CV: Pulse regularly present  Abdomen: Soft, appropriately tender, nondistended w/o rebound tenderness or guarding. Midline incision with light serosanguinous dressing. NGT in place with light rust-colored output.  Extremities: Warm and well perfused, moving spontaneously  : Tinoco in place with clear yellow output.    LABS:                        11.3   10.12 )-----------( 327      ( 18 Dec 2020 06:50 )             35.5     12-18    137  |  102  |  4<L>  ----------------------------<  139<H>  3.7   |  25  |  0.58    Ca    9.2      18 Dec 2020 06:50  Phos  2.8     12-18  Mg     1.9     12-18      PT/INR - ( 18 Dec 2020 06:50 )   PT: 12.9 sec;   INR: 1.13 ratio         PTT - ( 18 Dec 2020 06:50 )  PTT:32.5 sec      INs and OUTs:    12-17-20 @ 07:01  -  12-18-20 @ 07:00  --------------------------------------------------------  IN: 0 mL / OUT: 703 mL / NET: -703 mL    12-18-20 @ 07:01  -  12-19-20 @ 00:59  --------------------------------------------------------  IN: 0 mL / OUT: 650 mL / NET: -650 mL        Medications:  MEDICATIONS  (STANDING):  dextrose 40% Gel 15 Gram(s) Oral once  dextrose 5% + lactated ringers 1000 milliLiter(s) (100 mL/Hr) IV Continuous <Continuous>  dextrose 5%. 1000 milliLiter(s) (50 mL/Hr) IV Continuous <Continuous>  dextrose 5%. 1000 milliLiter(s) (100 mL/Hr) IV Continuous <Continuous>  dextrose 50% Injectable 25 Gram(s) IV Push once  dextrose 50% Injectable 12.5 Gram(s) IV Push once  dextrose 50% Injectable 25 Gram(s) IV Push once  enoxaparin Injectable 40 milliGRAM(s) SubCutaneous daily  glucagon  Injectable 1 milliGRAM(s) IntraMuscular once  insulin lispro (ADMELOG) corrective regimen sliding scale   SubCutaneous every 6 hours  levothyroxine Injectable 75 MICROGram(s) IV Push at bedtime  metoprolol tartrate Injectable 5 milliGRAM(s) IV Push every 6 hours  piperacillin/tazobactam IVPB.. 3.375 Gram(s) IV Intermittent every 8 hours    MEDICATIONS  (PRN):  
No significant improvement in symptoms. Remains focally tender. Wbc 11,. Based on pathology will need exploration and likely small bowel resection. Full discussion with patient. Dheld 
SURGERY DAILY PROGRESS NOTE:     INTERVAL: no acute events overnight. SBP slightly elevated. Metoprolol dose increased.     SUBJECTIVE/ROS: Patient feels well. Seen and examined at bedside. Denies nausea, vomiting, chest pain, shortness of breath. OOB/ambi     MEDICATIONS  (STANDING):  dextrose 40% Gel 15 Gram(s) Oral once  dextrose 5% + lactated ringers 1000 milliLiter(s) (100 mL/Hr) IV Continuous <Continuous>  dextrose 50% Injectable 25 Gram(s) IV Push once  dextrose 50% Injectable 12.5 Gram(s) IV Push once  dextrose 50% Injectable 25 Gram(s) IV Push once  enoxaparin Injectable 40 milliGRAM(s) SubCutaneous daily  fluconAZOLE IVPB 400 milliGRAM(s) IV Intermittent every 24 hours  glucagon  Injectable 1 milliGRAM(s) IntraMuscular once  insulin lispro (ADMELOG) corrective regimen sliding scale   SubCutaneous every 6 hours  levothyroxine Injectable 75 MICROGram(s) IV Push at bedtime  metoprolol tartrate IVPB 10 milliGRAM(s) IV Intermittent every 8 hours    MEDICATIONS  (PRN):  benzocaine 15 mG/menthol 3.6 mG (Sugar-Free) Lozenge 1 Lozenge Oral three times a day PRN Sore Throat  HYDROmorphone  Injectable 1 milliGRAM(s) IV Push every 4 hours PRN Severe Pain (7 - 10)  HYDROmorphone  Injectable 0.5 milliGRAM(s) IV Push every 4 hours PRN Moderate Pain (4 - 6)      OBJECTIVE:    Vital Signs Last 24 Hrs  T(C): 36.7 (23 Dec 2020 09:20), Max: 37.4 (22 Dec 2020 20:57)  T(F): 98 (23 Dec 2020 09:20), Max: 99.3 (22 Dec 2020 20:57)  HR: 69 (23 Dec 2020 09:20) (59 - 75)  BP: 173/64 (23 Dec 2020 09:20) (159/75 - 185/65)  BP(mean): --  RR: 18 (23 Dec 2020 09:20) (17 - 18)  SpO2: 100% (23 Dec 2020 09:20) (97% - 100%)    I&O's Detail    22 Dec 2020 07:01  -  23 Dec 2020 07:00  --------------------------------------------------------  IN:  Total IN: 0 mL    OUT:    Nasogastric/Oral tube (mL): 850 mL    Voided (mL): 2200 mL  Total OUT: 3050 mL    Total NET: -3050 mL      23 Dec 2020 07:01  -  23 Dec 2020 14:01  --------------------------------------------------------  IN:  Total IN: 0 mL    OUT:    Voided (mL): 0 mL  Total OUT: 0 mL    Total NET: 0 mL          Daily     Daily     LABS:                        10.1   15.67 )-----------( 554      ( 23 Dec 2020 10:37 )             31.5     12-23    137  |  102  |  6<L>  ----------------------------<  117<H>  4.7   |  24  |  0.55    Ca    9.6      23 Dec 2020 05:36  Phos  3.0     12-23  Mg     2.1     12-23      PT/INR - ( 23 Dec 2020 05:36 )   PT: 13.2 sec;   INR: 1.16 ratio         PTT - ( 23 Dec 2020 05:36 )  PTT:30.1 sec      PHYSICAL EXAM:  General: NAD, lying comfortably in bed  Abdomen: Soft, nontender, incision c/d/i  Extremities: no edema    A/P:    Patient is a 80 year old female with a PMHx of DM2, OA, hypothyroidism, cholecystectomy, VHR, jejunal diverticulitis (S/P small bowel resection 2012), HTN, HLD, AFib (on Xarelto) who presented with abdominal pain x4 days.  She was found to have a contained small bowel perforation vs. anastomotic leak.  Patient is now S/P exploratory laparotomy with bowel resection on 12/18/20.      PLAN:  - clamp trial on NGT if output <200; if appropriate dc tube  -NPO/IVF  - D5 + LR @100cc/hr  - Continue with IV Diflucan  - Daily dressing changes  - Out of bed  - Pain control  - VTE prophylaxis with Lovenox subcutaneous    [facility], [id number]
SURGERY PROGRESS NOTE    SUBJECTIVE / 24H EVENTS:  Patient seen and examined on morning rounds. No acute events overnight.  Still with complaints of abdominal pain. Denies N/V, fever, chills, SOB, chest pain.         OBJECTIVE:  VITAL SIGNS:  T(C): 36.8 (12-17-20 @ 05:21), Max: 37.2 (12-16-20 @ 17:37)  HR: 60 (12-17-20 @ 05:21) (59 - 66)  BP: 155/65 (12-17-20 @ 05:21) (145/58 - 163/50)  ABP: --  ABP(mean): --  RR: 17 (12-17-20 @ 05:21) (17 - 20)  SpO2: 99% (12-17-20 @ 05:21) (97% - 99%)  Wt(kg): --  CVP(mm Hg): --    12-16 @ 07:01  -  12-17 @ 07:00  --------------------------------------------------------  IN:    dextrose 5% + lactated ringers w/ Additives: 800 mL  Total IN: 800 mL    OUT:    Voided (mL): 1200 mL  Total OUT: 1200 mL    Total NET: -400 mL        PHYSICAL EXAM:  General: awake and alert, NAD  Respiratory: nonlabored breathing   CVS: RRR  Abdomen: Soft, non distended, diffuse tenderness to LLQ   Extremities: Warm bilaterally w/ palpable pulses.           LAB VALUES:  CBC (12-17 @ 08:13)                              10.9<L>                         9.53    )----------------(  283        --    % Neutrophils, --    % Lymphocytes, ANC: --                                  33.5<L>  CBC (12-16 @ 07:58)                              13.3                           11.40<H>  )----------------(  289        --    % Neutrophils, --    % Lymphocytes, ANC: --                                  41.5      BMP (12-17 @ 08:13)             --      |  --      |  5<L>  		Ca++ --      Ca 9.1                ---------------------------------( 131<H>		Mg 2.1                --      |  24      |  0.61  			Ph 2.3<L>  [facility] (12-16 @ 07:58)             140     |  104     |  7     		Ca++ --      Ca 9.5                ---------------------------------( 100<H>		Mg 2.5                3.2<L>  |  25      |  0.58  			Ph 2.8                     
Follow Up:      Inverval History/ROS:Patient is a 80y old  Female who presents with a chief complaint of Contained SB perforation vs. anastomotic leak (24 Dec 2020 07:29)    No fever  No events    Allergies    No Known Allergies    Intolerances        ANTIMICROBIALS:  linezolid  IVPB    linezolid  IVPB 600 every 12 hours  piperacillin/tazobactam IVPB.. 3.375 every 8 hours      OTHER MEDS:  benzocaine 15 mG/menthol 3.6 mG (Sugar-Free) Lozenge 1 Lozenge Oral three times a day PRN  dextrose 40% Gel 15 Gram(s) Oral once  dextrose 5% + lactated ringers 1000 milliLiter(s) IV Continuous <Continuous>  dextrose 50% Injectable 25 Gram(s) IV Push once  dextrose 50% Injectable 12.5 Gram(s) IV Push once  dextrose 50% Injectable 25 Gram(s) IV Push once  enoxaparin Injectable 40 milliGRAM(s) SubCutaneous daily  glucagon  Injectable 1 milliGRAM(s) IntraMuscular once  HYDROmorphone  Injectable 0.5 milliGRAM(s) IV Push every 4 hours PRN  HYDROmorphone  Injectable 1 milliGRAM(s) IV Push every 4 hours PRN  insulin lispro (ADMELOG) corrective regimen sliding scale   SubCutaneous every 6 hours  levothyroxine Injectable 75 MICROGram(s) IV Push at bedtime  metoprolol tartrate IVPB 10 milliGRAM(s) IV Intermittent every 8 hours      Vital Signs Last 24 Hrs  T(C): 36.9 (24 Dec 2020 05:48), Max: 37.3 (23 Dec 2020 21:06)  T(F): 98.4 (24 Dec 2020 05:48), Max: 99.1 (23 Dec 2020 21:06)  HR: 61 (24 Dec 2020 05:48) (59 - 69)  BP: 134/53 (24 Dec 2020 05:48) (134/53 - 178/66)  BP(mean): --  RR: 18 (24 Dec 2020 05:48) (17 - 18)  SpO2: 99% (24 Dec 2020 05:48) (99% - 100%)    PHYSICAL EXAM:  General: [ x] non-toxic  HEAD/EYES: [ ] PERRL [x ] white sclera [ ] icterus  ENT:  [ ] normal [x ] supple [ ] thrush [ ] pharyngeal exudate  Cardiovascular:   [ ] murmur [x ] normal [ ] PPM/AICD  Respiratory:  [ x] clear to ausculation bilaterally  GI:  x[ ] soft, mildly-tender, normal bowel sounds  :  [ ] ritchie [ ] no CVA tenderness   Musculoskeletal:  [ ] no synovitis  Neurologic:  [ ] non-focal exam   Skin:  [ x] no rash  Lymph: [ ] no lymphadenopathy  Psychiatric:  [ ] appropriate affect [x ] alert & oriented  Lines:  [x ] no phlebitis [ ] central line  x                              9.6    15.33 )-----------( 565      ( 24 Dec 2020 06:33 )             30.9       12-24    134<L>  |  100  |  6<L>  ----------------------------<  121<H>  4.5   |  26  |  0.71    Ca    9.3      24 Dec 2020 06:33  Phos  2.9     12-24  Mg     1.9     12-24            MICROBIOLOGY:Culture Results:   Few Candida albicans  Few Candida glabrata (12-19-20 @ 06:31)  Culture Results:   Few Candida albicans "Susceptibilities not performed"  Rare Escherichia coli  Moderate Enterococcus faecium (vancomycin resistant) (12-19-20 @ 06:25)      RADIOLOGY:    
SURGERY PROGRESS NOTE    SUBJECTIVE / 24H EVENTS:  Patient seen and examined on morning rounds. No acute events overnight.  Resting in bed, no complaints. NGT output 800cc. GI fx (-,-).      OBJECTIVE:  VITAL SIGNS:  T(C): 37.1 (20 @ 05:24), Max: 37.2 (20 @ 21:41)  HR: 62 (20 @ 05:24) (62 - 71)  BP: 173/69 (20 @ 05:24) (148/57 - 185/66)  RR: 18 (20 @ 05:24) (16 - 18)  SpO2: 96% (20 05:24) (95% - 98%)  Daily     Daily Weight in k (21 Dec 2020 01:01)  POCT Blood Glucose.: 131 mg/dL (20 @ 05:35)  POCT Blood Glucose.: 146 mg/dL (20 @ 23:34)  POCT Blood Glucose.: 139 mg/dL (20 @ 18:17)      Physical Exam:  General Appearance: Appears well, in no acute distress, awake, alert, and oriented x3.   Respiratory: No labored breathing  CV: Pulse regularly present  Abdomen: Soft, nontender, nondistended w/o rebound tenderness or guarding. Incision c/d/i. NGT in place.  Extremities: Warm and well perfused, moving spontaneously    20 @ 07:01  -  20 @ 07:00  --------------------------------------------------------  IN:  Total IN: 0 mL    OUT:    Nasogastric/Oral tube (mL): 800 mL    Voided (mL): 1450 mL  Total OUT: 2250 mL    Total NET: -2250 mL      20 @ 07:01  -  20 @ 07:44  --------------------------------------------------------  IN:  Total IN: 0 mL    OUT:    Voided (mL): 350 mL  Total OUT: 350 mL    Total NET: -350 mL          LAB VALUES:      139  |  102  |  6<L>  ----------------------------<  153<H>  3.9   |  25  |  0.58    Ca    8.9      20 Dec 2020 07:54  Phos  2.1     12-  Mg     1.8                                      10.5   11.21 )-----------( 411      ( 21 Dec 2020 06:52 )             33.7         MICROBIOLOGY:    Culture - Abscess with Gram Stain (collected 19 Dec 2020 06:25)  Source: .Abscess INTRA ABDOMINAL ABCESS  Preliminary Report (20 Dec 2020 12:57):    Few Candida albicans "Susceptibilities not performed"        MEDICATIONS  (STANDING):  dextrose 40% Gel 15 Gram(s) Oral once  dextrose 5% + lactated ringers 1000 milliLiter(s) (100 mL/Hr) IV Continuous <Continuous>  dextrose 5%. 1000 milliLiter(s) (50 mL/Hr) IV Continuous <Continuous>  dextrose 5%. 1000 milliLiter(s) (100 mL/Hr) IV Continuous <Continuous>  dextrose 50% Injectable 25 Gram(s) IV Push once  dextrose 50% Injectable 12.5 Gram(s) IV Push once  dextrose 50% Injectable 25 Gram(s) IV Push once  enoxaparin Injectable 40 milliGRAM(s) SubCutaneous daily  glucagon  Injectable 1 milliGRAM(s) IntraMuscular once  insulin lispro (ADMELOG) corrective regimen sliding scale   SubCutaneous every 6 hours  levothyroxine Injectable 75 MICROGram(s) IV Push at bedtime  metoprolol tartrate Injectable 5 milliGRAM(s) IV Push every 6 hours  piperacillin/tazobactam IVPB.. 3.375 Gram(s) IV Intermittent every 8 hours    MEDICATIONS  (PRN):  HYDROmorphone  Injectable 0.5 milliGRAM(s) IV Push every 4 hours PRN Moderate Pain (4 - 6)  HYDROmorphone  Injectable 1 milliGRAM(s) IV Push every 4 hours PRN Severe Pain (7 - 10)           
Surgery Daily Progress Note  =====================================================  Interval / Overnight Events: No acute events overnight.      HPI:  Patient is a 80 year old female with a PMHx of DM2, OA, hypothyroidism, cholecystectomy, VHR, jejunal diverticulitis (S/P small bowel resection 2012), HTN, HLD, AFib (on Xarelto) who presented with abdominal pain x4 days.  Patient reports pain similar to previous episodes of diverticulitis.  She called her PCP on Saturday and was prescribed Cipro / Flagyl and advised to only drink clears.  Patient went for an outpatient CT which showed a perforation at the site of the anastomosis. (15 Dec 2020 05:06)      PAST MEDICAL & SURGICAL HISTORY:  DM (diabetes mellitus)  Spinal stenosis of lumbar region  Sleep Apnea  Meniscus tear lateral left knee  h/o Prolapsed Uterus  h/o Pulmonary Embolus 30 yrs ago  h/o Phlebitis 30 yrs ago  h/o Cholecystitis  Hiatal Hernia  Hyperlipidemia  Arthritis  Diverticulitis  Adult Hypothyroidism  Right hand surgery 1999 , h/o arthritis  Left hand surgery 2004  h/o arthritis hand  h/o cholecystectomy  2010  S/P Hysterectomy  S/P Appendectomy  S/P Colon Resection      ALLERGIES:  No Known Allergies    --------------------------------------------------------------------------------------    MEDICATIONS:    Neurologic Medications  HYDROmorphone  Injectable 0.5 milliGRAM(s) IV Push every 4 hours PRN Moderate Pain (4 - 6)  HYDROmorphone  Injectable 1 milliGRAM(s) IV Push every 4 hours PRN Severe Pain (7 - 10)    Cardiovascular Medications  metoprolol tartrate Injectable 5 milliGRAM(s) IV Push every 6 hours    Gastrointestinal Medications  dextrose 5% + lactated ringers 1000 milliLiter(s) IV Continuous <Continuous>    Hematologic/Oncologic Medications  enoxaparin Injectable 40 milliGRAM(s) SubCutaneous daily    Antimicrobial/Immunologic Medications  fluconAZOLE IVPB 400 milliGRAM(s) IV Intermittent every 24 hours    Endocrine/Metabolic Medications  dextrose 40% Gel 15 Gram(s) Oral once  dextrose 50% Injectable 25 Gram(s) IV Push once  dextrose 50% Injectable 12.5 Gram(s) IV Push once  dextrose 50% Injectable 25 Gram(s) IV Push once  glucagon  Injectable 1 milliGRAM(s) IntraMuscular once  insulin lispro (ADMELOG) corrective regimen sliding scale   SubCutaneous every 6 hours  levothyroxine Injectable 75 MICROGram(s) IV Push at bedtime    --------------------------------------------------------------------------------------    VITAL SIGNS:  T(C): 36.9 (22 Dec 2020 06:54), Max: 36.9 (21 Dec 2020 18:00)  T(F): 98.5 (22 Dec 2020 06:54), Max: 98.5 (21 Dec 2020 18:00)  HR: 68 (22 Dec 2020 06:54) (59 - 68)  BP: 156/73 (22 Dec 2020 06:54) (144/61 - 184/57)  RR: 18 (22 Dec 2020 06:54) (17 - 18)  SpO2: 97% (22 Dec 2020 06:54) (96% - 99%)    --------------------------------------------------------------------------------------    INS AND OUTS:    21 Dec 2020 07:01  -  22 Dec 2020 07:00  --------------------------------------------------------  IN:  Total IN: 0 mL    OUT:    Nasogastric/Oral tube (mL): 900 mL    Voided (mL): 1500 mL  Total OUT: 2400 mL    Total NET: -2400 mL    --------------------------------------------------------------------------------------    EXAM    NEUROLOGY  Exam: Normal, in no acute distress.    HEENT  Exam: Normocephalic, atraumatic.    RESPIRATORY  Exam: Normal expansion / effort.    CARDIOVASCULAR  Exam: S1, S2.  Regular rate and rhythm.    GI/NUTRITION  Exam: Abdomen soft, Non-tender, Non-distended.  Midline incision packed with Kerlix.  NGT.  Current Diet: NPO    MUSCULOSKELETAL  Exam: All extremities moving spontaneously without limitations.    METABOLIC / FLUIDS / ELECTROLYTES  dextrose 5% + lactated ringers 1000 milliLiter(s) IV Continuous <Continuous>      HEMATOLOGIC  [x] VTE Prophylaxis: enoxaparin Injectable 40 milliGRAM(s) SubCutaneous daily      INFECTIOUS DISEASE  Antimicrobials/Immunologic Medications:  fluconAZOLE IVPB 400 milliGRAM(s) IV Intermittent every 24 hours    --------------------------------------------------------------------------------------
Surgery Progress Note    SUBJECTIVE: No acute events overnight. Over 24hrs, pt received 500cc bolus overnight due to low UOP. Earnest NH'ed, passed trial of void. Pt seen and examined at bedside. Patient comfortable. No nausea, vomiting, diarrhea. Pain is controlled. -Flatus/-BM.     Vital Signs Last 24 Hrs  T(C): 36.7 (20 Dec 2020 09:08), Max: 37.1 (20 Dec 2020 05:42)  T(F): 98 (20 Dec 2020 09:08), Max: 98.8 (20 Dec 2020 05:42)  HR: 64 (20 Dec 2020 09:08) (60 - 66)  BP: 175/61 (20 Dec 2020 09:08) (156/63 - 202/69)  BP(mean): --  RR: 18 (20 Dec 2020 09:08) (17 - 18)  SpO2: 98% (20 Dec 2020 09:08) (96% - 99%)    Physical Exam:  General Appearance: Appears well, in no acute distress, awake, alert, and oriented x3.   Respiratory: No labored breathing  CV: Pulse regularly present  Abdomen: Soft, nontender, nondistended w/o rebound tenderness or guarding. Incision c/d/i. NGT in place.  Extremities: Warm and well perfused, moving spontaneously    LABS:                        10.4   13.08 )-----------( 376      ( 20 Dec 2020 07:54 )             32.8     12-20    139  |  102  |  6<L>  ----------------------------<  153<H>  3.9   |  25  |  0.58    Ca    8.9      20 Dec 2020 07:54  Phos  2.1     12-20  Mg     1.8     12-20            INs and OUTs:    12-19-20 @ 07:01  -  12-20-20 @ 07:00  --------------------------------------------------------  IN: 0 mL / OUT: 2000 mL / NET: -2000 mL    12-20-20 @ 07:01  -  12-20-20 @ 11:28  --------------------------------------------------------  IN: 0 mL / OUT: 400 mL / NET: -400 mL        Medications:  MEDICATIONS  (STANDING):  dextrose 40% Gel 15 Gram(s) Oral once  dextrose 5% + lactated ringers 1000 milliLiter(s) (100 mL/Hr) IV Continuous <Continuous>  dextrose 5%. 1000 milliLiter(s) (50 mL/Hr) IV Continuous <Continuous>  dextrose 5%. 1000 milliLiter(s) (100 mL/Hr) IV Continuous <Continuous>  dextrose 50% Injectable 25 Gram(s) IV Push once  dextrose 50% Injectable 12.5 Gram(s) IV Push once  dextrose 50% Injectable 25 Gram(s) IV Push once  enoxaparin Injectable 40 milliGRAM(s) SubCutaneous daily  glucagon  Injectable 1 milliGRAM(s) IntraMuscular once  insulin lispro (ADMELOG) corrective regimen sliding scale   SubCutaneous every 6 hours  levothyroxine Injectable 75 MICROGram(s) IV Push at bedtime  metoprolol tartrate Injectable 5 milliGRAM(s) IV Push every 6 hours  piperacillin/tazobactam IVPB.. 3.375 Gram(s) IV Intermittent every 8 hours    MEDICATIONS  (PRN):  HYDROmorphone  Injectable 0.5 milliGRAM(s) IV Push every 4 hours PRN Moderate Pain (4 - 6)  HYDROmorphone  Injectable 1 milliGRAM(s) IV Push every 4 hours PRN Severe Pain (7 - 10)

## 2020-12-25 NOTE — DISCHARGE NOTE NURSING/CASE MANAGEMENT/SOCIAL WORK - NSDCPNPNATDISSUGG_GEN_ALL_CORE
A&Ox4. VS stable. afebrile. pain well managed. Tolerating diet. Midline incision dressing changed and taught to patients  and son-in-law taught dressing change. Supplies given to patient for five days/No

## 2020-12-25 NOTE — PROGRESS NOTE ADULT - PROVIDER SPECIALTY LIST ADULT
Surgery
Infectious Disease
Surgery

## 2020-12-31 LAB — SURGICAL PATHOLOGY STUDY: SIGNIFICANT CHANGE UP

## 2021-01-04 ENCOUNTER — APPOINTMENT (OUTPATIENT)
Dept: SURGERY | Facility: CLINIC | Age: 81
End: 2021-01-04
Payer: MEDICARE

## 2021-01-04 VITALS
TEMPERATURE: 97.6 F | HEART RATE: 72 BPM | SYSTOLIC BLOOD PRESSURE: 120 MMHG | WEIGHT: 162 LBS | HEIGHT: 64 IN | BODY MASS INDEX: 27.66 KG/M2 | DIASTOLIC BLOOD PRESSURE: 74 MMHG

## 2021-01-04 DIAGNOSIS — Z09 ENCOUNTER FOR FOLLOW-UP EXAMINATION AFTER COMPLETED TREATMENT FOR CONDITIONS OTHER THAN MALIGNANT NEOPLASM: ICD-10-CM

## 2021-01-04 PROCEDURE — 99024 POSTOP FOLLOW-UP VISIT: CPT

## 2021-01-04 NOTE — ASSESSMENT
[FreeTextEntry1] : patient is doing well, Tolerating a diet and moving her bowels. + soreness \par \par \par incision is healing well. 2 small open areas inferiorly. No drainage.\par \par \par \par continue daily wound care\par \par f/u 3 week \par \par 1000mg tylenol q6 for pain

## 2021-01-05 ENCOUNTER — INPATIENT (INPATIENT)
Facility: HOSPITAL | Age: 81
LOS: 1 days | Discharge: HOME | End: 2021-01-07
Attending: SURGERY | Admitting: SURGERY
Payer: MEDICARE

## 2021-01-05 VITALS
TEMPERATURE: 98 F | OXYGEN SATURATION: 99 % | WEIGHT: 162.04 LBS | DIASTOLIC BLOOD PRESSURE: 54 MMHG | HEIGHT: 63 IN | RESPIRATION RATE: 16 BRPM | HEART RATE: 73 BPM | SYSTOLIC BLOOD PRESSURE: 97 MMHG

## 2021-01-05 DIAGNOSIS — I48.91 UNSPECIFIED ATRIAL FIBRILLATION: ICD-10-CM
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
## 2021-01-05 DIAGNOSIS — K65.1 PERITONEAL ABSCESS: ICD-10-CM

## 2021-01-05 DIAGNOSIS — E11.9 TYPE 2 DIABETES MELLITUS WITHOUT COMPLICATIONS: ICD-10-CM

## 2021-01-05 DIAGNOSIS — E78.5 HYPERLIPIDEMIA, UNSPECIFIED: ICD-10-CM

## 2021-01-05 DIAGNOSIS — T81.89XA OTHER COMPLICATIONS OF PROCEDURES, NOT ELSEWHERE CLASSIFIED, INITIAL ENCOUNTER: ICD-10-CM

## 2021-01-05 DIAGNOSIS — K65.9 PERITONITIS, UNSPECIFIED: ICD-10-CM

## 2021-01-05 DIAGNOSIS — E03.9 HYPOTHYROIDISM, UNSPECIFIED: ICD-10-CM
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
## 2021-01-05 LAB
ALBUMIN SERPL ELPH-MCNC: 3.2 G/DL — LOW (ref 3.3–5)
ALP SERPL-CCNC: 68 U/L — SIGNIFICANT CHANGE UP (ref 40–120)
ALT FLD-CCNC: 21 U/L — SIGNIFICANT CHANGE UP (ref 12–78)
ANION GAP SERPL CALC-SCNC: 8 MMOL/L — SIGNIFICANT CHANGE UP (ref 5–17)
APPEARANCE UR: CLEAR — SIGNIFICANT CHANGE UP
APTT BLD: 44.1 SEC — HIGH (ref 27.5–35.5)
AST SERPL-CCNC: 9 U/L — LOW (ref 15–37)
BACTERIA # UR AUTO: ABNORMAL
BASOPHILS # BLD AUTO: 0 K/UL — SIGNIFICANT CHANGE UP (ref 0–0.2)
BASOPHILS NFR BLD AUTO: 0 % — SIGNIFICANT CHANGE UP (ref 0–2)
BILIRUB SERPL-MCNC: 0.4 MG/DL — SIGNIFICANT CHANGE UP (ref 0.2–1.2)
BILIRUB UR-MCNC: NEGATIVE — SIGNIFICANT CHANGE UP
BLD GP AB SCN SERPL QL: SIGNIFICANT CHANGE UP
BUN SERPL-MCNC: 15 MG/DL — SIGNIFICANT CHANGE UP (ref 7–23)
CALCIUM SERPL-MCNC: 9.2 MG/DL — SIGNIFICANT CHANGE UP (ref 8.5–10.1)
CHLORIDE SERPL-SCNC: 106 MMOL/L — SIGNIFICANT CHANGE UP (ref 96–108)
CO2 SERPL-SCNC: 23 MMOL/L — SIGNIFICANT CHANGE UP (ref 22–31)
COLOR SPEC: YELLOW — SIGNIFICANT CHANGE UP
CREAT SERPL-MCNC: 0.86 MG/DL — SIGNIFICANT CHANGE UP (ref 0.5–1.3)
DIFF PNL FLD: ABNORMAL
EOSINOPHIL # BLD AUTO: 0 K/UL — SIGNIFICANT CHANGE UP (ref 0–0.5)
EOSINOPHIL NFR BLD AUTO: 0 % — SIGNIFICANT CHANGE UP (ref 0–6)
EPI CELLS # UR: SIGNIFICANT CHANGE UP
FLUAV AG NPH QL: SIGNIFICANT CHANGE UP COUNTS
FLUBV AG NPH QL: SIGNIFICANT CHANGE UP COUNTS
GLUCOSE SERPL-MCNC: 123 MG/DL — HIGH (ref 70–99)
GLUCOSE UR QL: NEGATIVE MG/DL — SIGNIFICANT CHANGE UP
HCT VFR BLD CALC: 34.7 % — SIGNIFICANT CHANGE UP (ref 34.5–45)
HGB BLD-MCNC: 11.2 G/DL — LOW (ref 11.5–15.5)
INR BLD: 3.5 RATIO — HIGH (ref 0.88–1.16)
KETONES UR-MCNC: NEGATIVE — SIGNIFICANT CHANGE UP
LACTATE SERPL-SCNC: 1.4 MMOL/L — SIGNIFICANT CHANGE UP (ref 0.7–2)
LACTATE SERPL-SCNC: 2.6 MMOL/L — HIGH (ref 0.7–2)
LEUKOCYTE ESTERASE UR-ACNC: NEGATIVE — SIGNIFICANT CHANGE UP
LIDOCAIN IGE QN: 198 U/L — SIGNIFICANT CHANGE UP (ref 73–393)
LYMPHOCYTES # BLD AUTO: 1.56 K/UL — SIGNIFICANT CHANGE UP (ref 1–3.3)
LYMPHOCYTES # BLD AUTO: 7 % — LOW (ref 13–44)
MANUAL SMEAR VERIFICATION: SIGNIFICANT CHANGE UP
MCHC RBC-ENTMCNC: 28.3 PG — SIGNIFICANT CHANGE UP (ref 27–34)
MCHC RBC-ENTMCNC: 32.3 GM/DL — SIGNIFICANT CHANGE UP (ref 32–36)
MCV RBC AUTO: 87.6 FL — SIGNIFICANT CHANGE UP (ref 80–100)
MONOCYTES # BLD AUTO: 1.56 K/UL — HIGH (ref 0–0.9)
MONOCYTES NFR BLD AUTO: 7 % — SIGNIFICANT CHANGE UP (ref 2–14)
NEUTROPHILS # BLD AUTO: 19.12 K/UL — HIGH (ref 1.8–7.4)
NEUTROPHILS NFR BLD AUTO: 85 % — HIGH (ref 43–77)
NEUTS BAND # BLD: 1 % — SIGNIFICANT CHANGE UP (ref 0–8)
NITRITE UR-MCNC: NEGATIVE — SIGNIFICANT CHANGE UP
NRBC # BLD: 0 /100 — SIGNIFICANT CHANGE UP (ref 0–0)
NRBC # BLD: SIGNIFICANT CHANGE UP /100 WBCS (ref 0–0)
OB PNL STL: POSITIVE
PH UR: 6.5 — SIGNIFICANT CHANGE UP (ref 5–8)
PLAT MORPH BLD: NORMAL — SIGNIFICANT CHANGE UP
PLATELET # BLD AUTO: 356 K/UL — SIGNIFICANT CHANGE UP (ref 150–400)
POTASSIUM SERPL-MCNC: 3.7 MMOL/L — SIGNIFICANT CHANGE UP (ref 3.5–5.3)
POTASSIUM SERPL-SCNC: 3.7 MMOL/L — SIGNIFICANT CHANGE UP (ref 3.5–5.3)
PROT SERPL-MCNC: 7.4 GM/DL — SIGNIFICANT CHANGE UP (ref 6–8.3)
PROT UR-MCNC: NEGATIVE MG/DL — SIGNIFICANT CHANGE UP
PROTHROM AB SERPL-ACNC: 38.2 SEC — HIGH (ref 10.6–13.6)
RBC # BLD: 3.96 M/UL — SIGNIFICANT CHANGE UP (ref 3.8–5.2)
RBC # FLD: 14.5 % — SIGNIFICANT CHANGE UP (ref 10.3–14.5)
RBC BLD AUTO: NORMAL — SIGNIFICANT CHANGE UP
RBC CASTS # UR COMP ASSIST: SIGNIFICANT CHANGE UP /HPF (ref 0–4)
RSV RNA NPH QL NAA+NON-PROBE: SIGNIFICANT CHANGE UP COUNTS
SARS-COV-2 RNA SPEC QL NAA+PROBE: SIGNIFICANT CHANGE UP COUNTS
SODIUM SERPL-SCNC: 137 MMOL/L — SIGNIFICANT CHANGE UP (ref 135–145)
SP GR SPEC: 1 — LOW (ref 1.01–1.02)
TROPONIN I SERPL-MCNC: <.015 NG/ML — SIGNIFICANT CHANGE UP (ref 0.01–0.04)
UROBILINOGEN FLD QL: NEGATIVE MG/DL — SIGNIFICANT CHANGE UP
WBC # BLD: 22.23 K/UL — HIGH (ref 3.8–10.5)
WBC # FLD AUTO: 22.23 K/UL — HIGH (ref 3.8–10.5)
WBC UR QL: SIGNIFICANT CHANGE UP

## 2021-01-05 PROCEDURE — 99285 EMERGENCY DEPT VISIT HI MDM: CPT

## 2021-01-05 PROCEDURE — 71045 X-RAY EXAM CHEST 1 VIEW: CPT | Mod: 26

## 2021-01-05 PROCEDURE — 99222 1ST HOSP IP/OBS MODERATE 55: CPT

## 2021-01-05 PROCEDURE — 74174 CTA ABD&PLVS W/CONTRAST: CPT | Mod: 26,MH

## 2021-01-05 RX ORDER — PANTOPRAZOLE SODIUM 20 MG/1
8 TABLET, DELAYED RELEASE ORAL
Qty: 80 | Refills: 0 | Status: DISCONTINUED | OUTPATIENT
Start: 2021-01-05 | End: 2021-01-05

## 2021-01-05 RX ORDER — PIPERACILLIN AND TAZOBACTAM 4; .5 G/20ML; G/20ML
3.38 INJECTION, POWDER, LYOPHILIZED, FOR SOLUTION INTRAVENOUS EVERY 8 HOURS
Refills: 0 | Status: DISCONTINUED | OUTPATIENT
Start: 2021-01-05 | End: 2021-01-07

## 2021-01-05 RX ORDER — SODIUM CHLORIDE 9 MG/ML
1000 INJECTION INTRAMUSCULAR; INTRAVENOUS; SUBCUTANEOUS ONCE
Refills: 0 | Status: COMPLETED | OUTPATIENT
Start: 2021-01-05 | End: 2021-01-05

## 2021-01-05 RX ORDER — PITAVASTATIN CALCIUM 1.04 MG/1
1 TABLET, FILM COATED ORAL
Qty: 0 | Refills: 0 | DISCHARGE

## 2021-01-05 RX ORDER — DEXTROSE 50 % IN WATER 50 %
15 SYRINGE (ML) INTRAVENOUS ONCE
Refills: 0 | Status: DISCONTINUED | OUTPATIENT
Start: 2021-01-05 | End: 2021-01-07

## 2021-01-05 RX ORDER — SITAGLIPTIN 50 MG/1
1 TABLET, FILM COATED ORAL
Qty: 0 | Refills: 0 | DISCHARGE

## 2021-01-05 RX ORDER — ACETAMINOPHEN 500 MG
1000 TABLET ORAL EVERY 6 HOURS
Refills: 0 | Status: DISCONTINUED | OUTPATIENT
Start: 2021-01-05 | End: 2021-01-07

## 2021-01-05 RX ORDER — METOPROLOL TARTRATE 50 MG
50 TABLET ORAL DAILY
Refills: 0 | Status: DISCONTINUED | OUTPATIENT
Start: 2021-01-05 | End: 2021-01-07

## 2021-01-05 RX ORDER — PANTOPRAZOLE SODIUM 20 MG/1
40 TABLET, DELAYED RELEASE ORAL DAILY
Refills: 0 | Status: DISCONTINUED | OUTPATIENT
Start: 2021-01-05 | End: 2021-01-07

## 2021-01-05 RX ORDER — MORPHINE SULFATE 50 MG/1
2 CAPSULE, EXTENDED RELEASE ORAL ONCE
Refills: 0 | Status: DISCONTINUED | OUTPATIENT
Start: 2021-01-05 | End: 2021-01-05

## 2021-01-05 RX ORDER — OXYCODONE HYDROCHLORIDE 5 MG/1
10 TABLET ORAL EVERY 6 HOURS
Refills: 0 | Status: DISCONTINUED | OUTPATIENT
Start: 2021-01-05 | End: 2021-01-07

## 2021-01-05 RX ORDER — GLUCAGON INJECTION, SOLUTION 0.5 MG/.1ML
1 INJECTION, SOLUTION SUBCUTANEOUS ONCE
Refills: 0 | Status: DISCONTINUED | OUTPATIENT
Start: 2021-01-05 | End: 2021-01-07

## 2021-01-05 RX ORDER — LEVOTHYROXINE SODIUM 125 MCG
1 TABLET ORAL
Qty: 0 | Refills: 0 | DISCHARGE

## 2021-01-05 RX ORDER — DEXTROSE MONOHYDRATE, SODIUM CHLORIDE, AND POTASSIUM CHLORIDE 50; .745; 4.5 G/1000ML; G/1000ML; G/1000ML
1000 INJECTION, SOLUTION INTRAVENOUS
Refills: 0 | Status: DISCONTINUED | OUTPATIENT
Start: 2021-01-05 | End: 2021-01-07

## 2021-01-05 RX ORDER — DEXTROSE 50 % IN WATER 50 %
25 SYRINGE (ML) INTRAVENOUS ONCE
Refills: 0 | Status: DISCONTINUED | OUTPATIENT
Start: 2021-01-05 | End: 2021-01-07

## 2021-01-05 RX ORDER — SODIUM CHLORIDE 9 MG/ML
1000 INJECTION, SOLUTION INTRAVENOUS
Refills: 0 | Status: DISCONTINUED | OUTPATIENT
Start: 2021-01-05 | End: 2021-01-07

## 2021-01-05 RX ORDER — RIVAROXABAN 15 MG-20MG
20 KIT ORAL
Refills: 0 | Status: DISCONTINUED | OUTPATIENT
Start: 2021-01-06 | End: 2021-01-07

## 2021-01-05 RX ORDER — SODIUM CHLORIDE 9 MG/ML
600 INJECTION INTRAMUSCULAR; INTRAVENOUS; SUBCUTANEOUS ONCE
Refills: 0 | Status: COMPLETED | OUTPATIENT
Start: 2021-01-05 | End: 2021-01-05

## 2021-01-05 RX ORDER — ATORVASTATIN CALCIUM 80 MG/1
20 TABLET, FILM COATED ORAL AT BEDTIME
Refills: 0 | Status: DISCONTINUED | OUTPATIENT
Start: 2021-01-05 | End: 2021-01-07

## 2021-01-05 RX ORDER — LEVOTHYROXINE SODIUM 125 MCG
100 TABLET ORAL DAILY
Refills: 0 | Status: DISCONTINUED | OUTPATIENT
Start: 2021-01-05 | End: 2021-01-07

## 2021-01-05 RX ORDER — ESCITALOPRAM OXALATE 10 MG/1
1 TABLET, FILM COATED ORAL
Qty: 0 | Refills: 0 | DISCHARGE

## 2021-01-05 RX ORDER — HEPARIN SODIUM 5000 [USP'U]/ML
5000 INJECTION INTRAVENOUS; SUBCUTANEOUS EVERY 12 HOURS
Refills: 0 | Status: DISCONTINUED | OUTPATIENT
Start: 2021-01-05 | End: 2021-01-05

## 2021-01-05 RX ORDER — DEXTROSE 50 % IN WATER 50 %
12.5 SYRINGE (ML) INTRAVENOUS ONCE
Refills: 0 | Status: DISCONTINUED | OUTPATIENT
Start: 2021-01-05 | End: 2021-01-07

## 2021-01-05 RX ORDER — METFORMIN HYDROCHLORIDE 850 MG/1
1 TABLET ORAL
Qty: 0 | Refills: 0 | DISCHARGE

## 2021-01-05 RX ORDER — METOPROLOL TARTRATE 50 MG
1 TABLET ORAL
Qty: 0 | Refills: 0 | DISCHARGE

## 2021-01-05 RX ORDER — OXYCODONE AND ACETAMINOPHEN 5; 325 MG/1; MG/1
1 TABLET ORAL EVERY 4 HOURS
Refills: 0 | Status: DISCONTINUED | OUTPATIENT
Start: 2021-01-05 | End: 2021-01-07

## 2021-01-05 RX ORDER — PIPERACILLIN AND TAZOBACTAM 4; .5 G/20ML; G/20ML
3.38 INJECTION, POWDER, LYOPHILIZED, FOR SOLUTION INTRAVENOUS ONCE
Refills: 0 | Status: COMPLETED | OUTPATIENT
Start: 2021-01-05 | End: 2021-01-05

## 2021-01-05 RX ADMIN — SODIUM CHLORIDE 600 MILLILITER(S): 9 INJECTION INTRAMUSCULAR; INTRAVENOUS; SUBCUTANEOUS at 13:39

## 2021-01-05 RX ADMIN — SODIUM CHLORIDE 1000 MILLILITER(S): 9 INJECTION INTRAMUSCULAR; INTRAVENOUS; SUBCUTANEOUS at 12:45

## 2021-01-05 RX ADMIN — ATORVASTATIN CALCIUM 20 MILLIGRAM(S): 80 TABLET, FILM COATED ORAL at 22:00

## 2021-01-05 RX ADMIN — OXYCODONE HYDROCHLORIDE 10 MILLIGRAM(S): 5 TABLET ORAL at 20:31

## 2021-01-05 RX ADMIN — PIPERACILLIN AND TAZOBACTAM 200 GRAM(S): 4; .5 INJECTION, POWDER, LYOPHILIZED, FOR SOLUTION INTRAVENOUS at 13:39

## 2021-01-05 RX ADMIN — DEXTROSE MONOHYDRATE, SODIUM CHLORIDE, AND POTASSIUM CHLORIDE 75 MILLILITER(S): 50; .745; 4.5 INJECTION, SOLUTION INTRAVENOUS at 20:32

## 2021-01-05 RX ADMIN — OXYCODONE HYDROCHLORIDE 10 MILLIGRAM(S): 5 TABLET ORAL at 21:31

## 2021-01-05 RX ADMIN — MORPHINE SULFATE 2 MILLIGRAM(S): 50 CAPSULE, EXTENDED RELEASE ORAL at 12:43

## 2021-01-05 RX ADMIN — PIPERACILLIN AND TAZOBACTAM 25 GRAM(S): 4; .5 INJECTION, POWDER, LYOPHILIZED, FOR SOLUTION INTRAVENOUS at 20:31

## 2021-01-05 NOTE — H&P ADULT - HISTORY OF PRESENT ILLNESS
81 y/o F hx Afib on Xarelto s/p PPM, HTN, DM2, OA, hypothyroidism, hx of cholecystectomy, hysterectomy, jejunal diverticulitis s/p SBR (2012), perf small bowel divertiuclitis s/p ex lap 12/2020 with Dr. Lyon, p/w generalized pain. Pt followed up with Dr. Lyon yesterday for abdominal staple removal in the office. Pt reports she was feeling generally well until last night. She reports sudden onset of "pain all over" which later localized to the right and left upper abdomen. Pain is described at 10/10 sharp, and constant. Pt also reported feeling feverish at home. Pt reports her son is a pediatrician who swabbed her for COVID and flu at her home earlier today, which were both negative. Denies nausea, vomiting, cough, chest pain, SOB, sore throat. Reports passing gas and normal BMs. Denies melena, hematochezia, diarrhea. Denies alcohol or tobacco use.

## 2021-01-05 NOTE — ED PROVIDER NOTE - NS ED ROS FT
CONST: +fevers/ chills, no trauma  EYES: no pain, no visual disturbances  ENT: no sore throat, no epistaxis, no rhinorrhea, no hearing changes  CV: no chest pain, no palpitations, no orthopnea, no extremity pain or swelling  RESP: no shortness of breath, no cough, no sputum, no pleurisy, no wheezing  ABD: + abdominal pain, no nausea, no vomiting, no diarrhea, no black or bloody stool  : no dysuria, no hematuria, no frequency, no urgency  MSK: no back pain, no neck pain, no extremity pain  NEURO: no headache, no sensory disturbances, no focal weakness, no dizziness  HEME: no easy bleeding or bruising  SKIN: no diaphoresis, no rash

## 2021-01-05 NOTE — ED ADULT NURSE NOTE - OBJECTIVE STATEMENT
a&oX4, ambulating. pt c/o abdominal pain/bodyaches/fever/headache/earache x2 days, constant, pain 10/10. pt denies falls/injuries. pt denies N/V/sob/cp/dizziness/chills.

## 2021-01-05 NOTE — ED PROVIDER NOTE - PHYSICAL EXAMINATION
VITALS: reviewed  GEN: NAD, A & O x 4  HEAD/EYES: NCAT, PERRL, EOMI, anicteric sclerae, no conjunctival pallor  ENT: mucus membranes moist, oropharynx WNL, trachea midline,  RESP: lungs CTA with equal breath sounds bilaterally, chest wall nontender and atraumatic  CV: heart with reg rhythm S1, S2, no murmur; distal pulses intact and symmetric bilaterally  ABDOMEN: soft, nondistended, diffuse ttp, midline incision cdi, no palpable masses  : no CVAT  MSK: extremities atraumatic and nontender, no edema, no asymmetry.   SKIN: warm, dry, no rash, no bruising, no cyanosis. color appropriate for ethnicity  NEURO: alert, mentating appropriately, no facial asymmetry. gross sensation, motor, coordination are intact  PSYCH: Affect appropriate

## 2021-01-05 NOTE — ED PROVIDER NOTE - OBJECTIVE STATEMENT
79 yo F hx Afib on Xarelto s/p PPM, HTN, DM2, OA, hypothyroidism, s/o cholecystectomy, jejunal diverticulitis s/p SBR (2012), complicated diverticulitis with perf s/p ex lap 12/2020, presenting with abdominal pain and fever that started last night. Patient states she had outpatient labwork done that showed WBC count 22 and left shift. Discharged from hospital on 12/25, no pain/fevers at that time. Denies any purulent discharge or erythema from incision site. Followed up with Dr. Lyon yesterday.

## 2021-01-05 NOTE — H&P ADULT - NSHPPHYSICALEXAM_GEN_ALL_CORE
GENERAL: NAD  HEAD:  Atraumatic, Normocephalic  EYES: Conjunctiva and sclera clear  NERVOUS SYSTEM:  Alert & Oriented X3, Good concentration  CHEST/LUNG: Respirations nonlabored   HEART: Regular rate and rhythm  ABDOMEN: Midline abdominal wound covered with gauze and tape, removed. Wound edges clean, no purulence or bleeding noted. Covered with clean gauze and tape. Abdomen soft, nondistended, no tenderness to palpation, no rebound or guarding, no rigidity  EXTREMITIES:  2+ Peripheral Pulses, No clubbing, cyanosis, or edema

## 2021-01-05 NOTE — H&P ADULT - ASSESSMENT
Impression: 81 y/o F hx Afib on Xarelto s/p PPM, HTN, DM2, OA, hypothyroidism, hx of cholecystectomy, hysterectomy, jejunal diverticulitis s/p SBR (2012), perf small bowel diverticulitis s/p ex lap 12/2020 with Dr. Lyon admitted with fluid collection around small bowel anastamosis.

## 2021-01-05 NOTE — ED PROVIDER NOTE - CLINICAL SUMMARY MEDICAL DECISION MAKING FREE TEXT BOX
81 yo F presenting with fever, abdominal pain and elevated wbc post-op. sepsis labs sent. First bp 97/54, improved to sbp 116- continue to monitor tx with 30cc/kg bolus IVF, consider pressors if no improvement with MAPS <65, HR normal, guaiac sent though low suspicion for bleed, likely 2/2 sepsis. , pain meds, abx (zosyn sensitive based on previous abscess culture). CT abd/pelv, c/s surgery tba. 81 yo F presenting with fever, abdominal pain and elevated wbc post-op. sepsis labs sent. First bp 97/54, improved to sbp 116- continue to monitor tx with 30cc/kg bolus IVF, consider pressors if no improvement with MAPS <65, HR normal, guaiac sent though low suspicion for bleed, likely 2/2 sepsis. , pain meds, abx (zosyn sensitive based on previous abscess culture). CT abd/pelv, c/s surgery tba.    BP improved to 120's, repeat lactate sent

## 2021-01-05 NOTE — H&P ADULT - PROBLEM SELECTOR PLAN 1
-Admit to surgical service.  -Per Dr. Lyon, will hold off on IR consult at this time given size of collection.  -Conservative management with IV Zosyn.   -Continue Xarelto.   -F/u am labs, trend leukocytosis.   -Discussed with Dr. Lyon. -Admit to surgical service.  -Per Dr. Lyon, will hold off on IR consult at this time given size of collection.  -Conservative management with IV Zosyn.   -F/u am labs, trend leukocytosis.   -Discussed with Dr. Lyon.

## 2021-01-05 NOTE — H&P ADULT - NSHPLABSRESULTS_GEN_ALL_CORE
11.2   22.23 )-----------( 356      ( 05 Jan 2021 12:48 )             34.7   01-05    137  |  106  |  15  ----------------------------<  123<H>  3.7   |  23  |  0.86    Ca    9.2      05 Jan 2021 12:48    TPro  7.4  /  Alb  3.2<L>  /  TBili  0.4  /  DBili  x   /  AST  9<L>  /  ALT  21  /  AlkPhos  68  01-05    < from: CT Angio Abdomen and Pelvis w/ IV Cont (01.05.21 @ 14:13) >    EXAM:  CT ANGIO ABD PELV (W)AW IC                          PROCEDURE DATE:  01/05/2021      INTERPRETATION:  CLINICAL INFORMATION: Abdominal pain    COMPARISON: 12/15/2020    PROCEDURE:  CT of the Abdomen and Pelvis was performed with and without intravenous contrast.  Precontrast, Arterial and Delayed phases were performed.  Intravenous contrast: 90 ml Omnipaque 350. 10 ml discarded.  Oral contrast: None.  Sagittal and coronal reformats were performed.    FINDINGS:  LOWER CHEST: Clear lung bases. Aortic valve, coronary artery calcifications. Partially imaged cardiac leads.    LIVER: Multiple subcentimeter hepatic hypodensities, too small to further characterize.  BILE DUCTS: Mild dilatation.  GALLBLADDER: Cholecystectomy.  SPLEEN: Within normal limits.  PANCREAS: Within normal limits.  ADRENALS: Within normal limits.  KIDNEYS/URETERS: Subcentimeter renal hypodensities, too small to further characterize. No hydronephrosis.    BLADDER: Within normal limits.  REPRODUCTIVE ORGANS: Hysterectomy.    BOWEL: Rectosigmoid and small bowel anastomoses. Inflammatory changes of the small bowel anastomosis, possibly postsurgical. No bowel obstruction. Appendix is not visualized. No evidence of inflammation in the pericecal region. Colonic and small bowel diverticulosis. No areas of active intravenous contrast extravasation identified within the bowel.  PERITONEUM: No ascites. Small anterior abdominal fluid collection adjacent to small bowel anastomosis measuring 3.4 x 1.5 cm.  VESSELS:Atherosclerotic changes.  RETROPERITONEUM/LYMPH NODES: No lymphadenopathy.  ABDOMINAL WALL: Postsurgical changes.  BONES: Degenerative changes.    IMPRESSION:  No CT evidence of active gastrointestinal bleeding.  Anterior abdominal small bowel anastomosis with inflammatory changes and adjacent fluid collection measuring 3.4 x 1.5 cm.      AGNIESZKA SNEED MD; Attending Radiologist  This document has been electronically signed. Jan 5 2021  2:42PM    < end of copied text >

## 2021-01-06 ENCOUNTER — TRANSCRIPTION ENCOUNTER (OUTPATIENT)
Age: 81
End: 2021-01-06

## 2021-01-06 LAB
A1C WITH ESTIMATED AVERAGE GLUCOSE RESULT: 5.8 % — HIGH (ref 4–5.6)
ALBUMIN SERPL ELPH-MCNC: 2.7 G/DL — LOW (ref 3.3–5)
ALP SERPL-CCNC: 50 U/L — SIGNIFICANT CHANGE UP (ref 40–120)
ALT FLD-CCNC: 19 U/L — SIGNIFICANT CHANGE UP (ref 12–78)
ANION GAP SERPL CALC-SCNC: 5 MMOL/L — SIGNIFICANT CHANGE UP (ref 5–17)
AST SERPL-CCNC: 9 U/L — LOW (ref 15–37)
BASOPHILS # BLD AUTO: 0.07 K/UL — SIGNIFICANT CHANGE UP (ref 0–0.2)
BASOPHILS NFR BLD AUTO: 0.5 % — SIGNIFICANT CHANGE UP (ref 0–2)
BILIRUB DIRECT SERPL-MCNC: 0.14 MG/DL — SIGNIFICANT CHANGE UP (ref 0.05–0.2)
BILIRUB INDIRECT FLD-MCNC: 0.4 MG/DL — SIGNIFICANT CHANGE UP (ref 0.2–1)
BILIRUB SERPL-MCNC: 0.5 MG/DL — SIGNIFICANT CHANGE UP (ref 0.2–1.2)
BUN SERPL-MCNC: 6 MG/DL — LOW (ref 7–23)
CALCIUM SERPL-MCNC: 8.8 MG/DL — SIGNIFICANT CHANGE UP (ref 8.5–10.1)
CHLORIDE SERPL-SCNC: 111 MMOL/L — HIGH (ref 96–108)
CO2 SERPL-SCNC: 26 MMOL/L — SIGNIFICANT CHANGE UP (ref 22–31)
CREAT SERPL-MCNC: 0.72 MG/DL — SIGNIFICANT CHANGE UP (ref 0.5–1.3)
CULTURE RESULTS: SIGNIFICANT CHANGE UP
EOSINOPHIL # BLD AUTO: 0.29 K/UL — SIGNIFICANT CHANGE UP (ref 0–0.5)
EOSINOPHIL NFR BLD AUTO: 2.2 % — SIGNIFICANT CHANGE UP (ref 0–6)
ESTIMATED AVERAGE GLUCOSE: 120 MG/DL — HIGH (ref 68–114)
GLUCOSE BLDC GLUCOMTR-MCNC: 113 MG/DL — HIGH (ref 70–99)
GLUCOSE BLDC GLUCOMTR-MCNC: 137 MG/DL — HIGH (ref 70–99)
GLUCOSE BLDC GLUCOMTR-MCNC: 138 MG/DL — HIGH (ref 70–99)
GLUCOSE BLDC GLUCOMTR-MCNC: 146 MG/DL — HIGH (ref 70–99)
GLUCOSE SERPL-MCNC: 141 MG/DL — HIGH (ref 70–99)
HCT VFR BLD CALC: 28.1 % — LOW (ref 34.5–45)
HGB BLD-MCNC: 9 G/DL — LOW (ref 11.5–15.5)
IMM GRANULOCYTES NFR BLD AUTO: 0.7 % — SIGNIFICANT CHANGE UP (ref 0–1.5)
INR BLD: 1.46 RATIO — HIGH (ref 0.88–1.16)
LYMPHOCYTES # BLD AUTO: 14.8 % — SIGNIFICANT CHANGE UP (ref 13–44)
LYMPHOCYTES # BLD AUTO: 2 K/UL — SIGNIFICANT CHANGE UP (ref 1–3.3)
MCHC RBC-ENTMCNC: 27.8 PG — SIGNIFICANT CHANGE UP (ref 27–34)
MCHC RBC-ENTMCNC: 32 GM/DL — SIGNIFICANT CHANGE UP (ref 32–36)
MCV RBC AUTO: 86.7 FL — SIGNIFICANT CHANGE UP (ref 80–100)
MONOCYTES # BLD AUTO: 0.91 K/UL — HIGH (ref 0–0.9)
MONOCYTES NFR BLD AUTO: 6.8 % — SIGNIFICANT CHANGE UP (ref 2–14)
NEUTROPHILS # BLD AUTO: 10.1 K/UL — HIGH (ref 1.8–7.4)
NEUTROPHILS NFR BLD AUTO: 75 % — SIGNIFICANT CHANGE UP (ref 43–77)
NRBC # BLD: 0 /100 WBCS — SIGNIFICANT CHANGE UP (ref 0–0)
PLATELET # BLD AUTO: 250 K/UL — SIGNIFICANT CHANGE UP (ref 150–400)
POTASSIUM SERPL-MCNC: 3.9 MMOL/L — SIGNIFICANT CHANGE UP (ref 3.5–5.3)
POTASSIUM SERPL-SCNC: 3.9 MMOL/L — SIGNIFICANT CHANGE UP (ref 3.5–5.3)
PROT SERPL-MCNC: 6.7 GM/DL — SIGNIFICANT CHANGE UP (ref 6–8.3)
PROTHROM AB SERPL-ACNC: 16.6 SEC — HIGH (ref 10.6–13.6)
RBC # BLD: 3.24 M/UL — LOW (ref 3.8–5.2)
RBC # FLD: 14.5 % — SIGNIFICANT CHANGE UP (ref 10.3–14.5)
SODIUM SERPL-SCNC: 142 MMOL/L — SIGNIFICANT CHANGE UP (ref 135–145)
SPECIMEN SOURCE: SIGNIFICANT CHANGE UP
WBC # BLD: 13.47 K/UL — HIGH (ref 3.8–10.5)
WBC # FLD AUTO: 13.47 K/UL — HIGH (ref 3.8–10.5)

## 2021-01-06 PROCEDURE — 99232 SBSQ HOSP IP/OBS MODERATE 35: CPT

## 2021-01-06 RX ADMIN — OXYCODONE AND ACETAMINOPHEN 1 TABLET(S): 5; 325 TABLET ORAL at 22:08

## 2021-01-06 RX ADMIN — PIPERACILLIN AND TAZOBACTAM 25 GRAM(S): 4; .5 INJECTION, POWDER, LYOPHILIZED, FOR SOLUTION INTRAVENOUS at 05:17

## 2021-01-06 RX ADMIN — DEXTROSE MONOHYDRATE, SODIUM CHLORIDE, AND POTASSIUM CHLORIDE 75 MILLILITER(S): 50; .745; 4.5 INJECTION, SOLUTION INTRAVENOUS at 11:14

## 2021-01-06 RX ADMIN — PIPERACILLIN AND TAZOBACTAM 25 GRAM(S): 4; .5 INJECTION, POWDER, LYOPHILIZED, FOR SOLUTION INTRAVENOUS at 22:03

## 2021-01-06 RX ADMIN — OXYCODONE AND ACETAMINOPHEN 1 TABLET(S): 5; 325 TABLET ORAL at 16:02

## 2021-01-06 RX ADMIN — PIPERACILLIN AND TAZOBACTAM 25 GRAM(S): 4; .5 INJECTION, POWDER, LYOPHILIZED, FOR SOLUTION INTRAVENOUS at 11:24

## 2021-01-06 RX ADMIN — OXYCODONE HYDROCHLORIDE 10 MILLIGRAM(S): 5 TABLET ORAL at 04:06

## 2021-01-06 RX ADMIN — ATORVASTATIN CALCIUM 20 MILLIGRAM(S): 80 TABLET, FILM COATED ORAL at 22:03

## 2021-01-06 RX ADMIN — OXYCODONE HYDROCHLORIDE 10 MILLIGRAM(S): 5 TABLET ORAL at 09:42

## 2021-01-06 RX ADMIN — Medication 50 MILLIGRAM(S): at 05:17

## 2021-01-06 RX ADMIN — PANTOPRAZOLE SODIUM 40 MILLIGRAM(S): 20 TABLET, DELAYED RELEASE ORAL at 11:14

## 2021-01-06 RX ADMIN — RIVAROXABAN 20 MILLIGRAM(S): KIT at 17:13

## 2021-01-06 RX ADMIN — Medication 100 MICROGRAM(S): at 06:19

## 2021-01-06 RX ADMIN — OXYCODONE HYDROCHLORIDE 10 MILLIGRAM(S): 5 TABLET ORAL at 03:06

## 2021-01-06 NOTE — DISCHARGE NOTE PROVIDER - NSDCFUADDINST_GEN_ALL_CORE_FT
Continue to take prescribed antibiotic until course is completed.     May take Tylenol for pain as needed. May take prescribed narcotic pain medication for severe pain as needed. DO NOT DRIVE WHILE TAKING NARCOTIC MEDICATION.     Keep midline incision clean and covered with dry gauze dressing and tape.

## 2021-01-06 NOTE — DISCHARGE NOTE PROVIDER - NSDCMRMEDTOKEN_GEN_ALL_CORE_FT
Januvia 25 mg oral tablet: 1 tab(s) orally once a day  levothyroxine 100 mcg (0.1 mg) oral tablet: 1 tab(s) orally once a day  Livalo 4 mg oral tablet: 1 tab(s) orally once a day  metFORMIN 500 mg oral tablet, extended release: 1 tab(s) orally once a day  metoprolol succinate 50 mg oral tablet, extended release: 1 tab(s) orally once a day  Xarelto 20 mg oral tablet: 1 tab(s) orally once a day in the evening with a meal   amoxicillin-clavulanate 875 mg-125 mg oral tablet: 1 tab(s) orally 2 times a day   Januvia 25 mg oral tablet: 1 tab(s) orally once a day  levothyroxine 100 mcg (0.1 mg) oral tablet: 1 tab(s) orally once a day  Livalo 4 mg oral tablet: 1 tab(s) orally once a day  metFORMIN 500 mg oral tablet, extended release: 1 tab(s) orally once a day  metoprolol succinate 50 mg oral tablet, extended release: 1 tab(s) orally once a day  Xarelto 20 mg oral tablet: 1 tab(s) orally once a day in the evening with a meal

## 2021-01-06 NOTE — DISCHARGE NOTE PROVIDER - CARE PROVIDER_API CALL
Mick Lyon  COLON/RECTAL SURGERY  [address] [place] [address]  Phone: [phone]  Fax: [phone]  Established Patient  Follow Up Time: Routine

## 2021-01-06 NOTE — DISCHARGE NOTE PROVIDER - NSDCCPCAREPLAN_GEN_ALL_CORE_FT
PRINCIPAL DISCHARGE DIAGNOSIS  Diagnosis: Sepsis  Assessment and Plan of Treatment:       SECONDARY DISCHARGE DIAGNOSES  Diagnosis: Abdominal infection  Assessment and Plan of Treatment:

## 2021-01-06 NOTE — DISCHARGE NOTE PROVIDER - NSDCFUSCHEDAPPT_GEN_ALL_CORE_FT
FLETCHER VALLE ; 01/11/2021 ; [facility] [address] ; 02/01/2021 ; [facility] [address]

## 2021-01-06 NOTE — DISCHARGE NOTE PROVIDER - HOSPITAL COURSE
79 y/o F hx Afib on Xarelto s/p PPM, HTN, DM2, OA, hypothyroidism, hx of cholecystectomy, hysterectomy, jejunal diverticulitis s/p SBR (2012), perf small bowel diverticulitis s/p ex lap 12/2020 with Dr. Lyon, p/w generalized pain. Pt followed up with Dr. Lyon yesterday for abdominal staple removal in the office. Pt reports she was feeling generally well until last night. She reports sudden onset of "pain all over" which later localized to the right and left upper abdomen. Pain is described at 10/10 sharp, and constant. Pt also reported feeling feverish at home. Pt reports her son is a pediatrician who swabbed her for COVID and flu at her home earlier today, which were both negative. Denies nausea, vomiting, cough, chest pain, SOB, sore throat. Reports passing gas and normal BMs. Denies melena, hematochezia, diarrhea. Denies alcohol or tobacco use. Found with fluid collection around small bowel anastamosis.     Patient admitted to surgical service. Started on Zosyn and monitored for increased leukocytosis and/or fevers. Pain managed appropriately, diet was advanced as tolerated, and patient ambulating on own.     Patient stable for discharge from surgical standpoint. Continue to take prescribed antibiotic until course is completed. May take Tylenol for pain as needed. May take prescribed narcotic pain medication for severe pain as needed. DO NOT DRIVE WHILE TAKING NARCOTIC MEDICATION.     Keep midline incision clean and covered with dry gauze dressing and tape.    Please follow up with Dr. Lyon in office. 81 y/o F hx Afib on Xarelto s/p PPM, HTN, DM2, OA, hypothyroidism, hx of cholecystectomy, hysterectomy, jejunal diverticulitis s/p SBR (2012), perf small bowel diverticulitis s/p ex lap 12/2020 with Dr. Lyon, p/w generalized pain. Pt followed up with Dr. Lyon yesterday for abdominal staple removal in the office. Pt reports she was feeling generally well until last night. She reports sudden onset of "pain all over" which later localized to the right and left upper abdomen. Pain is described at 10/10 sharp, and constant. Pt also reported feeling feverish at home. Pt reports her son is a pediatrician who swabbed her for COVID and flu at her home earlier today, which were both negative. Denies nausea, vomiting, cough, chest pain, SOB, sore throat. Reports passing gas and normal BMs. Denies melena, hematochezia, diarrhea. Denies alcohol or tobacco use. Found with fluid collection around small bowel anastamosis.     Patient admitted to surgical service. Started on Zosyn and monitored for increased leukocytosis and/or fevers. Pain managed appropriately, diet was advanced as tolerated, and patient ambulating on own.     Patient stable for discharge from surgical standpoint. Continue to take prescribed antibiotic until course is completed. May take Tylenol for pain as needed. May take OTC pain medication for pain as needed.     Keep midline incision clean and covered with dry gauze dressing and tape.    Please follow up with Dr. Loyn in office.

## 2021-01-07 ENCOUNTER — TRANSCRIPTION ENCOUNTER (OUTPATIENT)
Age: 81
End: 2021-01-07

## 2021-01-07 VITALS
RESPIRATION RATE: 18 BRPM | OXYGEN SATURATION: 97 % | HEART RATE: 77 BPM | TEMPERATURE: 98 F | DIASTOLIC BLOOD PRESSURE: 65 MMHG | SYSTOLIC BLOOD PRESSURE: 120 MMHG

## 2021-01-07 LAB
ANION GAP SERPL CALC-SCNC: 6 MMOL/L — SIGNIFICANT CHANGE UP (ref 5–17)
BUN SERPL-MCNC: 6 MG/DL — LOW (ref 7–23)
CALCIUM SERPL-MCNC: 8.9 MG/DL — SIGNIFICANT CHANGE UP (ref 8.5–10.1)
CHLORIDE SERPL-SCNC: 109 MMOL/L — HIGH (ref 96–108)
CO2 SERPL-SCNC: 26 MMOL/L — SIGNIFICANT CHANGE UP (ref 22–31)
CREAT SERPL-MCNC: 0.52 MG/DL — SIGNIFICANT CHANGE UP (ref 0.5–1.3)
GLUCOSE BLDC GLUCOMTR-MCNC: 115 MG/DL — HIGH (ref 70–99)
GLUCOSE BLDC GLUCOMTR-MCNC: 153 MG/DL — HIGH (ref 70–99)
GLUCOSE BLDC GLUCOMTR-MCNC: 202 MG/DL — HIGH (ref 70–99)
GLUCOSE SERPL-MCNC: 125 MG/DL — HIGH (ref 70–99)
HCT VFR BLD CALC: 27.9 % — LOW (ref 34.5–45)
HGB BLD-MCNC: 8.8 G/DL — LOW (ref 11.5–15.5)
INR BLD: 2.04 RATIO — HIGH (ref 0.88–1.16)
MAGNESIUM SERPL-MCNC: 2.2 MG/DL — SIGNIFICANT CHANGE UP (ref 1.6–2.6)
MCHC RBC-ENTMCNC: 27.7 PG — SIGNIFICANT CHANGE UP (ref 27–34)
MCHC RBC-ENTMCNC: 31.5 GM/DL — LOW (ref 32–36)
MCV RBC AUTO: 87.7 FL — SIGNIFICANT CHANGE UP (ref 80–100)
NRBC # BLD: 0 /100 WBCS — SIGNIFICANT CHANGE UP (ref 0–0)
PHOSPHATE SERPL-MCNC: 3.3 MG/DL — SIGNIFICANT CHANGE UP (ref 2.5–4.5)
PLATELET # BLD AUTO: 238 K/UL — SIGNIFICANT CHANGE UP (ref 150–400)
POTASSIUM SERPL-MCNC: 3.9 MMOL/L — SIGNIFICANT CHANGE UP (ref 3.5–5.3)
POTASSIUM SERPL-SCNC: 3.9 MMOL/L — SIGNIFICANT CHANGE UP (ref 3.5–5.3)
PROTHROM AB SERPL-ACNC: 22.9 SEC — HIGH (ref 10.6–13.6)
RBC # BLD: 3.18 M/UL — LOW (ref 3.8–5.2)
RBC # FLD: 14.4 % — SIGNIFICANT CHANGE UP (ref 10.3–14.5)
SODIUM SERPL-SCNC: 141 MMOL/L — SIGNIFICANT CHANGE UP (ref 135–145)
WBC # BLD: 11.4 K/UL — HIGH (ref 3.8–10.5)
WBC # FLD AUTO: 11.4 K/UL — HIGH (ref 3.8–10.5)

## 2021-01-07 PROCEDURE — 99238 HOSP IP/OBS DSCHRG MGMT 30/<: CPT

## 2021-01-07 RX ADMIN — RIVAROXABAN 20 MILLIGRAM(S): KIT at 17:17

## 2021-01-07 RX ADMIN — PIPERACILLIN AND TAZOBACTAM 25 GRAM(S): 4; .5 INJECTION, POWDER, LYOPHILIZED, FOR SOLUTION INTRAVENOUS at 05:57

## 2021-01-07 RX ADMIN — DEXTROSE MONOHYDRATE, SODIUM CHLORIDE, AND POTASSIUM CHLORIDE 75 MILLILITER(S): 50; .745; 4.5 INJECTION, SOLUTION INTRAVENOUS at 19:49

## 2021-01-07 RX ADMIN — Medication 1 TABLET(S): at 17:17

## 2021-01-07 RX ADMIN — Medication 100 MICROGRAM(S): at 05:57

## 2021-01-07 RX ADMIN — PANTOPRAZOLE SODIUM 40 MILLIGRAM(S): 20 TABLET, DELAYED RELEASE ORAL at 11:32

## 2021-01-07 RX ADMIN — Medication 50 MILLIGRAM(S): at 05:56

## 2021-01-07 RX ADMIN — OXYCODONE AND ACETAMINOPHEN 1 TABLET(S): 5; 325 TABLET ORAL at 08:10

## 2021-01-07 RX ADMIN — DEXTROSE MONOHYDRATE, SODIUM CHLORIDE, AND POTASSIUM CHLORIDE 75 MILLILITER(S): 50; .745; 4.5 INJECTION, SOLUTION INTRAVENOUS at 05:58

## 2021-01-07 RX ADMIN — OXYCODONE HYDROCHLORIDE 10 MILLIGRAM(S): 5 TABLET ORAL at 19:45

## 2021-01-07 NOTE — DISCHARGE NOTE NURSING/CASE MANAGEMENT/SOCIAL WORK - PATIENT PORTAL LINK FT
You can access the FollowMyHealth Patient Portal offered by [facility] by registering at the following website: [email or web address]. By joining [employer]’s FollowMyHealth portal, you will also be able to view your health information using other applications (apps) compatible with our system.

## 2021-01-07 NOTE — PROGRESS NOTE ADULT - SUBJECTIVE AND OBJECTIVE BOX
Much improved with IV antibiotics. Still has incisional pain. WBC down to 11,> Exam -neg. To switch to PO antibiotics and likely D/C tomarrow. Dheld
HOD # 2    SUBJECTIVE:  81 y/o F seen and examined at bedside. No acute overnight events noted. Pt denies any fevers, chills, chest pain, shortness of breath, abdominal pain, nausea, vomiting or diarrhea.    Vital Signs Last 24 Hrs  T(C): 36.9 (2021 05:14), Max: 36.9 (2021 05:14)  T(F): 98.5 (2021 05:14), Max: 98.5 (2021 05:14)  HR: 63 (2021 05:14) (62 - 79)  BP: 138/74 (2021 05:14) (138/74 - 150/74)  BP(mean): --  RR: 18 (2021 05:14) (18 - 18)  SpO2: 97% (2021 05:14) (96% - 97%)    PHYSICAL EXAM:  GENERAL: No acute distress, well-developed  CHEST/LUNG: CTABL, no ronchi, rales or wheezing  HEART: RRR, no MRGs  ABDOMEN: Soft, nonttp, nondistended, +bs, dressing c/d/i  NEUROLOGY: A&O x 3, no focal deficits    I&O's Summary    2021 07:01  -  2021 07:00  --------------------------------------------------------  IN: 900 mL / OUT: 0 mL / NET: 900 mL      I&O's Detail    2021 07:01  -  2021 07:00  --------------------------------------------------------  IN:    dextrose 5% + sodium chloride 0.9% w/ Additives: 900 mL  Total IN: 900 mL    OUT:  Total OUT: 0 mL    Total NET: 900 mL        MEDICATIONS  (STANDING):  amoxicillin  875 milliGRAM(s)/clavulanate 1 Tablet(s) Oral two times a day  atorvastatin 20 milliGRAM(s) Oral at bedtime  dextrose 40% Gel 15 Gram(s) Oral once  dextrose 5% + sodium chloride 0.9% with potassium chloride 20 mEq/L 1000 milliLiter(s) (75 mL/Hr) IV Continuous <Continuous>  dextrose 5%. 1000 milliLiter(s) (50 mL/Hr) IV Continuous <Continuous>  dextrose 5%. 1000 milliLiter(s) (100 mL/Hr) IV Continuous <Continuous>  dextrose 50% Injectable 25 Gram(s) IV Push once  dextrose 50% Injectable 12.5 Gram(s) IV Push once  dextrose 50% Injectable 25 Gram(s) IV Push once  glucagon  Injectable 1 milliGRAM(s) IntraMuscular once  levothyroxine 100 MICROGram(s) Oral daily  metoprolol succinate ER 50 milliGRAM(s) Oral daily  pantoprazole  Injectable 40 milliGRAM(s) IV Push daily  rivaroxaban 20 milliGRAM(s) Oral with dinner    MEDICATIONS  (PRN):  acetaminophen   Tablet .. 1000 milliGRAM(s) Oral every 6 hours PRN Mild Pain (1 - 3)  oxycodone    5 mG/acetaminophen 325 mG 1 Tablet(s) Oral every 4 hours PRN Moderate Pain (4 - 6)  oxyCODONE    IR 10 milliGRAM(s) Oral every 6 hours PRN Severe Pain (7 - 10)    LABS:                        8.8    11.40 )-----------( 238      ( 2021 08:47 )             27.9     01-    141  |  109<H>  |  6<L>  ----------------------------<  125<H>  3.9   |  26  |  0.52    Ca    8.9      2021 08:47  Phos  3.3     -  Mg     2.2     -    TPro  6.7  /  Alb  2.7<L>  /  TBili  0.5  /  DBili  .14  /  AST  9<L>  /  ALT  19  /  AlkPhos  50  -    PT/INR - ( 2021 08:47 )   PT: 22.9 sec;   INR: 2.04 ratio         PTT - ( 2021 12:49 )  PTT:44.1 sec  Urinalysis Basic - ( 2021 15:18 )    Color: Yellow / Appearance: Clear / S.005 / pH: x  Gluc: x / Ketone: Negative  / Bili: Negative / Urobili: Negative mg/dL   Blood: x / Protein: Negative mg/dL / Nitrite: Negative   Leuk Esterase: Negative / RBC: 0-2 /HPF / WBC 0-2   Sq Epi: x / Non Sq Epi: Few / Bacteria: Few      ASSESSMENT  81 y/o F HOD # 2 hx Afib on Xarelto s/p PPM, HTN, DM2, OA, hypothyroidism, hx of cholecystectomy, hysterectomy, jejunal diverticulitis s/p SBR (), perf small bowel diverticulitis s/p ex lap 2020 admitted with fluid collection around small bowel anastomosis, no without abdominal pain, tolerating reg diet    PLAN  - transitioned to PO augmentin bid  - d/c planning for am   - pain control, supportive care  - zofran prn for nausea  - OOB, ambulation as tolerated with assistance  - am labs. trend cbc  - discussed with Dr. Lyon  
SURGERY PROGRESS HPI:  Pt seen and examined at bedside. Pain is well controlled on pain medication. Pt denies complaints. No acute events overnight. Pt tolerating clear liquids overnight. Pt denies nausea and vomiting. Last BM was yesterday. Passing flatus overnight. Voiding well. Pt denies chest pain, SOB, dizziness, fever, chills. Ambulating.      Vital Signs Last 24 Hrs  T(C): 37.2 (2021 05:03), Max: 37.3 (2021 00:16)  T(F): 99 (2021 05:03), Max: 99.1 (2021 00:16)  HR: 67 (2021 05:03) (61 - 98)  BP: 128/64 (2021 05:03) (97/54 - 143/55)  BP(mean): --  RR: 18 (2021 05:03) (16 - 18)  SpO2: 95% (2021 05:03) (95% - 99%)      PHYSICAL EXAM:    GENERAL: NAD  CHEST/LUNG: Clear to ausculation, bilaterally   HEART: RRR S1S2  ABDOMEN: Dressing clean/dry/intact. Wound cleansed with NS and new dressing placed. non distended, +BS, soft, non tender, no guarding  EXTREMITIES:  calf soft, non tender b/l    I&O's Detail      LABS:                        11.2   22.23 )-----------( 356      ( 2021 12:48 )             34.7         137  |  106  |  15  ----------------------------<  123<H>  3.7   |  23  |  0.86    Ca    9.2      2021 12:48    TPro  7.4  /  Alb  3.2<L>  /  TBili  0.4  /  DBili  x   /  AST  9<L>  /  ALT  21  /  AlkPhos  68  -    PT/INR - ( 2021 12:49 )   PT: 38.2 sec;   INR: 3.50 ratio         PTT - ( 2021 12:49 )  PTT:44.1 sec  Urinalysis Basic - ( 2021 15:18 )    Color: Yellow / Appearance: Clear / S.005 / pH: x  Gluc: x / Ketone: Negative  / Bili: Negative / Urobili: Negative mg/dL   Blood: x / Protein: Negative mg/dL / Nitrite: Negative   Leuk Esterase: Negative / RBC: 0-2 /HPF / WBC 0-2   Sq Epi: x / Non Sq Epi: Few / Bacteria: Few        Assessment: 81 y/o F hx Afib on Xarelto s/p PPM, HTN, DM2, OA, hypothyroidism, hx of cholecystectomy, hysterectomy, jejunal diverticulitis s/p SBR (), perf small bowel diverticulitis s/p ex lap 2020 with Dr. Lyon admitted with fluid collection around small bowel anastomosis.     Plan:  -pain management prn  -continue DVT prophylaxis, OOB, Ambulating, IS  -continue local wound care  -Per Dr. Lyon, will hold off on IR consult at this time given size of collection.  -Conservative management with IV Zosyn.   -F/u AM labs, trend leukocytosis.    
Patient seen and examined this am. She states taking 10mg oxycodone every 6hrs because pain returns. Denies any fevers overnight. Tolerating a diet, + BM yesterday. laying in bed comfortably.      Vital Signs Last 24 Hrs  T(C): 37.2 (06 Jan 2021 05:03), Max: 37.3 (06 Jan 2021 00:16)  T(F): 99 (06 Jan 2021 05:03), Max: 99.1 (06 Jan 2021 00:16)  HR: 67 (06 Jan 2021 05:03) (61 - 98)  BP: 128/64 (06 Jan 2021 05:03) (97/54 - 143/55)  BP(mean): --  RR: 18 (06 Jan 2021 05:03) (16 - 18)  SpO2: 95% (06 Jan 2021 05:03) (95% - 99%)      Gen: NAD A&OX3  Abd: soft, ND + tenderness to palpation lower abd.        79 yo female almost 4 weeks s/p SBR for perforated diverticulum, presented to ED yesterday with fevers and increased abdominal pain and found to have a small collection on CT.    -f/u am labs  -consider aspiration with IR and culture as patient had prior culture with VRE   -Continue oxy for pain

## 2021-01-08 LAB
SARS-COV-2 IGG SERPL QL IA: NEGATIVE — SIGNIFICANT CHANGE UP
SARS-COV-2 IGM SERPL IA-ACNC: 0.24 RATIO — SIGNIFICANT CHANGE UP

## 2021-01-10 LAB
CULTURE RESULTS: SIGNIFICANT CHANGE UP
CULTURE RESULTS: SIGNIFICANT CHANGE UP
SPECIMEN SOURCE: SIGNIFICANT CHANGE UP
SPECIMEN SOURCE: SIGNIFICANT CHANGE UP

## 2021-01-11 ENCOUNTER — APPOINTMENT (OUTPATIENT)
Dept: SURGERY | Facility: CLINIC | Age: 81
End: 2021-01-11

## 2021-01-12 DIAGNOSIS — I48.91 UNSPECIFIED ATRIAL FIBRILLATION: ICD-10-CM

## 2021-01-12 DIAGNOSIS — E11.9 TYPE 2 DIABETES MELLITUS WITHOUT COMPLICATIONS: ICD-10-CM

## 2021-01-12 DIAGNOSIS — I10 ESSENTIAL (PRIMARY) HYPERTENSION: ICD-10-CM

## 2021-01-12 DIAGNOSIS — Z95.0 PRESENCE OF CARDIAC PACEMAKER: ICD-10-CM

## 2021-01-12 DIAGNOSIS — K63.89 OTHER SPECIFIED DISEASES OF INTESTINE: ICD-10-CM

## 2021-01-12 DIAGNOSIS — M19.90 UNSPECIFIED OSTEOARTHRITIS, UNSPECIFIED SITE: ICD-10-CM

## 2021-01-12 DIAGNOSIS — Z79.01 LONG TERM (CURRENT) USE OF ANTICOAGULANTS: ICD-10-CM

## 2021-01-12 DIAGNOSIS — A41.9 SEPSIS, UNSPECIFIED ORGANISM: ICD-10-CM

## 2021-01-12 DIAGNOSIS — E03.9 HYPOTHYROIDISM, UNSPECIFIED: ICD-10-CM

## 2021-01-12 DIAGNOSIS — R19.00 INTRA-ABDOMINAL AND PELVIC SWELLING, MASS AND LUMP, UNSPECIFIED SITE: ICD-10-CM

## 2021-01-12 DIAGNOSIS — Z79.84 LONG TERM (CURRENT) USE OF ORAL HYPOGLYCEMIC DRUGS: ICD-10-CM

## 2021-01-14 LAB
CULTURE RESULTS: SIGNIFICANT CHANGE UP
SPECIMEN SOURCE: SIGNIFICANT CHANGE UP

## 2021-02-01 ENCOUNTER — APPOINTMENT (OUTPATIENT)
Dept: SURGERY | Facility: CLINIC | Age: 81
End: 2021-02-01

## 2021-04-22 NOTE — PROVIDER CONTACT NOTE (OTHER) - NAME OF MD/NP/PA/DO NOTIFIED:
Chief Complaint   Patient presents with    Hypertension     f/u     Hematuria     pt thinks she may have a uti      BP has improved. Compliant with medications  Denies any chest pain, SOB at rest.  No cough. No abdominal pain, nausea, vomiting. Has dark urine. Denies any frequency, urgency, fever, chills    Past Medical History:   Diagnosis Date    Anemia 1994    Anemia     Arrhythmia     Bronchitis     Heart murmur     Stammering/stuttering     Thyroid disease     Uterine fibroid     with biopsy     O: Blood pressure (!) 149/94, pulse 82, temperature 97.7 °F (36.5 °C), temperature source Temporal, height 5' 3\" (1.6 m), weight 229 lb 9.6 oz (104.1 kg), not currently breastfeeding. Physical Examination:  General appearance - alert, well appearing, and in no distress  Chest - clear to auscultation, no wheezes, rales or rhonchi, symmetric air entry  Heart - normal rate, regular rhythm, normal S1, S2, no murmurs, rubs, clicks or gallops  Abdomen - soft, nontender  Skin- warm, dry  Mental status - Normal mood, judgement and thought process      A/P:    Andrea Butt was seen today for hypertension and other. Diagnoses and all orders for this visit:    Dark urine  -     POCT Urinalysis No Micro (Auto) was unremarkable  -     Culture, Urine; Future    Hypothyroidism, unspecified type  -     levothyroxine (SYNTHROID) 75 MCG tablet; Take 1 tablet by mouth daily  -     Continue the same    HTN:   continue the same    Other orders  -     ferrous sulfate (FE TABS) 325 (65 Fe) MG EC tablet; Take 1 tablet by mouth daily (with breakfast)  -     omeprazole (PRILOSEC) 20 MG delayed release capsule;  Take 1 capsule by mouth daily (with breakfast)
Cristian [phone]
Doyle Luna
WARREN Hartman
kai Boyer

## 2021-06-15 NOTE — PATIENT PROFILE ADULT. - HARM RISK FACTORS
Met with patient today after RN assessment. Gave CCC folder and filled out family contact form and LEMUEL for [facility], [place]. Pt is scheduled for an MEV in February and would like reminder calls the night before.       
no

## 2021-07-26 NOTE — ED CDU PROVIDER NOTE - RESPIRATORY, MLM
furosemide (Lasix) 40 MG tablet Take 1 tablet by mouth daily.     metoPROLOL tartrate (LOPRESSOR) 25 MG tablet Take 0.5 tablets by mouth every 12 hours.   amLODIPine (NORVASC) 10 MG tablet Take 1 tablet by mouth daily.     lisinopril (ZESTRIL) 40 MG tablet Take 1 tablet by mouth daily     Current BP medications above.   Breath sounds clear and equal bilaterally.

## 2022-07-08 NOTE — ED CDU PROVIDER NOTE - CARDIOVASCULAR NEGATIVE STATEMENT, MLM
GENERAL: no acute distress, non-toxic appearing  HEAD: normocephalic, atraumatic  HEENT: normal conjunctiva, oral mucosa moist, neck supple  CARDIAC: regular rate and rhythm, normal S1 and S2,  no appreciable murmurs  PULM: clear to ascultation bilaterally, no crackles, rales, rhonchi, or wheezing  GI: abdomen nondistended, soft, nontender, no guarding or rebound tenderness  : no CVA tenderness, no suprapubic tenderness  NEURO: alert and oriented x 3, normal speech, PERRLA, EOMI, no focal motor or sensory deficits  MSK: no visible deformities, no peripheral edema, calf tenderness/redness/swelling  SKIN: no visible rashes, dry, well-perfused    (+) L anterior leg: rash encompassing most of the anterior shins and posterior calf, blanchable ttp  PSYCH: appropriate mood and affect
normal rate and rhythm, no chest pain and no edema.

## 2023-10-01 PROBLEM — K52.9 ENTERITIS: Status: ACTIVE | Noted: 2017-03-02

## 2024-02-02 NOTE — PRE-OP CHECKLIST - AS TEMP SITE
CRS Office Visit Followup    Referring Md:   No referring provider defined for this encounter.    SUBJECTIVE:     Chief Complaint: defecatory dysfunction    History of Present Illness:  Course is as follows:  Patient is a 42 y.o. female presents with defecatory dysfunction.     Seen by GI for evaluation for constipation  Taking linzess 290 every morning. Not really helping.  trulance was effective, then stopped working   Tried motegrity - got bad headaches. Not taking anymore  Failed amitiza  Tried pelvic physical therapy with no significant improvement.  Tried intermittent enemas with no significant improvement.  Restarted a regimen of Linzess 290 mcg every day.  This was ineffective.  She then restarted Trulance.  she will not have any bowel movements unless she takes a laxative.  With a laxative, she will have bowel movements once per week.  Without a laxative, she will have bowel movements once per month.  She complains of significant bloating, abdominal distention, pain, and inability to function normally secondary to her discomfort.  Thyroid function normal.    Past abdominal surgeries of prior  through a lower midline, abdominal plasty, and recent laparoscopic appendectomy.    No family hx of CRC in 1st degree.   Aunt - CRC    Workup:  Colonoscopy:  10/20/2021: Normal ileum and colon with redundant sigmoid colon  Defecogram 22:   - The anorectal angle measurements fall within range and there is expected increase in the angle on defecation.    - Small anterior rectocele.    - Small amount of residual after evacuation   CT abdomen pelvis 2023 reviewed demonstrates moderate amount of stool throughout the colon.  Small-bowel follow-through 2028: Normal small bowel follow through evaluation  Sitz Marker Study:    Day 1 (2023):  13 markers in the right colon.  Four markers of the hepatic flexure.  Two markers in the transverse colon.  Day 3 (2023):  5 markers at the splenic  "flexure.  One marker in the rectum.  Day 5 (07/28/2023):  2 markers in the descending colon.  Four markers in the rectum.    Impression:  Consistent with slow transit constipation.     08/22/2023: Presents for follow-up.  Continuing to have significant issues with constipation.   - Manometry balloon was then inserted and filled to 60 mL of water.  She was then placed on the toilet and asked to expel the balloon.  She expelled the balloon in 2 minutes.  2/2/24:  Presents for evaluation for total abdominal colectomy.  Having to take increasing amounts of laxatives in order to have a bowel movement.  Normally takes 48-72 hours after laxatives to generate a bowel movement.  With laxatives, she is having bowel movements once per week..      Last Colonoscopy:  10/20/2021:  Impression:            - The examined portion of the ileum was normal.                          - The entire examined colon is normal on direct                          and retroflexion views. Sigmoid colon is redundant.                          - No specimens collected.       Review of Systems:  Review of Systems   Constitutional:  Negative for chills, diaphoresis, fever, malaise/fatigue and weight loss.   HENT:  Negative for congestion.    Respiratory:  Negative for shortness of breath.    Cardiovascular:  Negative for chest pain and leg swelling.   Gastrointestinal:  Positive for abdominal pain and constipation. Negative for blood in stool, nausea and vomiting.   Genitourinary:  Negative for dysuria.   Musculoskeletal:  Negative for back pain and myalgias.   Skin:  Negative for rash.   Neurological:  Negative for dizziness and weakness.   Endo/Heme/Allergies:  Does not bruise/bleed easily.   Psychiatric/Behavioral:  Negative for depression.        OBJECTIVE:     Vital Signs (Most Recent)  BP (!) 144/94 (BP Location: Left arm, Patient Position: Sitting, BP Method: Large (Automatic))   Pulse 70   Ht 5' 5" (1.651 m)   Wt 89.3 kg (196 lb 13.9 oz)   " LMP 01/05/2024 (Exact Date)   BMI 32.76 kg/m²     Physical Exam:  General: Black or  female in no distress   Neuro: alert and oriented x 4.  Moves all extremities.     HEENT: no icterus.  Trachea midline  Respiratory: respirations are even and unlabored  Cardiac: regular rate  Abdomen:  Prior abdominal plasty incision at her belt line healing well.  Laparoscopic incisions from prior appendectomy also healing well.  Extremities: Warm dry and intact  Skin: no rashes  Anorectal:  External exam was normal.  Digital exam performed with normal resting tone.  No masses palpated.  Manometry balloon was then inserted and filled to 60 mL of water.  She was then placed on the toilet and asked to expel the balloon.  She expelled the balloon in 2 minutes.    Labs:  H&H 13 and 38.  Albumin 4.2.  Normal renal function.  Normal thyroid function.    Imaging:  CT abdomen pelvis 07/09/2023 reviewed demonstrates moderate amount of stool throughout the colon.  Acute appendicitis.      ASSESSMENT/PLAN:     Diagnoses and all orders for this visit:    Slow transit constipation  -     Case Request Operating Room: COLECTOMY, TOTAL, LAPAROSCOPIC,ERAS low, SIGMOIDOSCOPY, FLEXIBLE            42 y.o. female with significant constipation.  Defecography shows that the rectum was functional to liquids.  Balloon expulsion was performed today in clinic she was able to expel the balloon with 60 mL of water.  Small-bowel follow-through demonstrates normal transit time of her small bowel.  Sitz marker study consistent with slow transit constipation.    She is tried medical therapy including Linzess, Amitiza, Trulance, pelvic physical therapy, enemas, and increasing doses of laxatives.  Since she has been refractory to all medical therapy, total abdominal colectomy was indicated.  This was discussed with her.  She understands the risks of anastomotic leak, diarrhea, pain, hernia, wound infection, bleeding.  She wished to proceed.   Consents were signed today and all questions answered.        KOKO Naylor MD, FACS, FASCRS  Staff Surgeon  Colon & Rectal Surgery       oral

## 2024-03-20 NOTE — DISCHARGE NOTE PROVIDER - NSDCQMCOGNITION_NEU_ALL_CORE
No difficulties PRINCIPAL DISCHARGE DIAGNOSIS  Diagnosis: Dyspnea  Assessment and Plan of Treatment: You came in with heart failure exacerbation. You should continue Lasix at home. Low salt diet, fluid restriction to 1500 ml daily, monitor your fluid intake and weight daily, exercise as tolerated 30 minutes daily, and follow up with your physician within 7 days.         SECONDARY DISCHARGE DIAGNOSES  Diagnosis: Severe anemia  Assessment and Plan of Treatment: You had anemia. You received a unit of blood. You underwent an endoscopy 3/21. It is important that you continue to follow up for pathology. Your blood counts have been stable - continue Eliquis. Do not take aspirin anymore.

## 2025-01-13 NOTE — ED ADULT NURSE NOTE - CAS DISCH BELONGINGS RETURNED
Problem: PAIN - ADULT  Goal: Verbalizes/displays adequate comfort level or baseline comfort level  Description: Interventions:  - Encourage patient to monitor pain and request assistance  - Assess pain using appropriate pain scale  - Administer analgesics based on type and severity of pain and evaluate response  - Implement non-pharmacological measures as appropriate and evaluate response  - Consider cultural and social influences on pain and pain management  - Notify physician/advanced practitioner if interventions unsuccessful or patient reports new pain  Outcome: Progressing     Problem: Nutrition/Hydration-ADULT  Goal: Nutrient/Hydration intake appropriate for improving, restoring or maintaining nutritional needs  Description: Monitor and assess patient's nutrition/hydration status for malnutrition. Collaborate with interdisciplinary team and initiate plan and interventions as ordered.  Monitor patient's weight and dietary intake as ordered or per policy. Utilize nutrition screening tool and intervene as necessary. Determine patient's food preferences and provide high-protein, high-caloric foods as appropriate.     INTERVENTIONS:  - Monitor oral intake, urinary output, labs, and treatment plans  - Assess nutrition and hydration status and recommend course of action  - Evaluate amount of meals eaten  - Assist patient with eating if necessary   - Allow adequate time for meals  - Recommend/ encourage appropriate diets, oral nutritional supplements, and vitamin/mineral supplements  - Order, calculate, and assess calorie counts as needed  - Recommend, monitor, and adjust tube feedings and TPN/PPN based on assessed needs  - Assess need for intravenous fluids  - Provide specific nutrition/hydration education as appropriate  - Include patient/family/caregiver in decisions related to nutrition  Outcome: Progressing      Not applicable
